# Patient Record
Sex: MALE | Race: WHITE | NOT HISPANIC OR LATINO | Employment: PART TIME | URBAN - METROPOLITAN AREA
[De-identification: names, ages, dates, MRNs, and addresses within clinical notes are randomized per-mention and may not be internally consistent; named-entity substitution may affect disease eponyms.]

---

## 2018-04-16 PROBLEM — Z87.891 FORMER TOBACCO USE: Status: ACTIVE | Noted: 2018-04-16

## 2018-04-16 PROBLEM — Z95.1 S/P CABG (CORONARY ARTERY BYPASS GRAFT): Status: ACTIVE | Noted: 2018-04-16

## 2018-04-17 ENCOUNTER — OFFICE VISIT (OUTPATIENT)
Dept: CARDIOLOGY | Facility: CLINIC | Age: 78
End: 2018-04-17
Attending: INTERNAL MEDICINE
Payer: MEDICARE

## 2018-04-17 VITALS
RESPIRATION RATE: 16 BRPM | DIASTOLIC BLOOD PRESSURE: 60 MMHG | BODY MASS INDEX: 26.18 KG/M2 | HEIGHT: 71 IN | HEART RATE: 59 BPM | SYSTOLIC BLOOD PRESSURE: 116 MMHG | WEIGHT: 187 LBS

## 2018-04-17 DIAGNOSIS — I25.10 CAD IN NATIVE ARTERY: ICD-10-CM

## 2018-04-17 DIAGNOSIS — I63.30 CEREBRAL INFARCTION DUE TO THROMBOSIS OF CEREBRAL ARTERY (CMS/HCC): Primary | ICD-10-CM

## 2018-04-17 DIAGNOSIS — Z95.1 S/P CABG (CORONARY ARTERY BYPASS GRAFT): ICD-10-CM

## 2018-04-17 PROCEDURE — 99214 OFFICE O/P EST MOD 30 MIN: CPT | Performed by: INTERNAL MEDICINE

## 2018-04-17 PROCEDURE — 93000 ELECTROCARDIOGRAM COMPLETE: CPT | Performed by: INTERNAL MEDICINE

## 2018-04-17 RX ORDER — ATORVASTATIN CALCIUM 20 MG/1
20 TABLET, FILM COATED ORAL DAILY
COMMUNITY
Start: 2017-11-27 | End: 2018-11-23 | Stop reason: SDUPTHER

## 2018-04-17 RX ORDER — LANOLIN ALCOHOL/MO/W.PET/CERES
1000 CREAM (GRAM) TOPICAL DAILY
COMMUNITY
Start: 2016-11-23

## 2018-04-17 RX ORDER — VIT C/E/ZN/COPPR/LUTEIN/ZEAXAN 250MG-90MG
4 CAPSULE ORAL DAILY
COMMUNITY
Start: 2016-11-23

## 2018-04-17 RX ORDER — PETROLATUM,WHITE/LANOLIN
OINTMENT (GRAM) TOPICAL DAILY
COMMUNITY
End: 2020-07-17

## 2018-04-17 RX ORDER — CLOPIDOGREL BISULFATE 75 MG/1
75 TABLET ORAL DAILY
COMMUNITY
Start: 2017-06-09 | End: 2018-06-06 | Stop reason: SDUPTHER

## 2018-04-17 RX ORDER — PANTOPRAZOLE SODIUM 40 MG/1
40 TABLET, DELAYED RELEASE ORAL EVERY MORNING
COMMUNITY
Start: 2014-08-18

## 2018-04-17 RX ORDER — ASPIRIN 81 MG/1
81 TABLET ORAL DAILY
COMMUNITY
Start: 2011-03-24

## 2018-04-17 NOTE — PROGRESS NOTES
"   Cardiology Note       Reason for visit: Follow-up of coronary heart disease.  Chief Complaint   Patient presents with   • Coronary Artery Disease   Status post past TIA.  Status post coronary bypass graft surgery.     HPI   Ko Shearer is a 77 y.o. male who presents with a complaint of \"spasm\" around his diaphragm.  He has had 3 such episodes in the past 6 months, the most recent about 3 or 4 days ago.  They do not occur with activity.  They do not awaken him from sleep.  They are short-lived.  Not associated with neurologic symptoms, diaphoresis, nausea vomiting or palpitations.    Since last seen here no other changes in his personal health.  He denies any neurologic symptoms.  Appetite good weight stable.  No ankle edema.  No visual change.  No hematemesis.  No hematuria.  No new rash.  No joint aches.  Since he begun taking magnesium his kidney stone production is been much less.    No new medications.          Past Medical History:   Diagnosis Date   • Coronary artery disease    • Lipid disorder    • TIA (transient ischemic attack)      History reviewed. No pertinent surgical history.  Social History     Social History Narrative   • No narrative on file     No family history on file.  Adhesive tape-silicones  Current Outpatient Prescriptions   Medication Sig Dispense Refill   • aspirin (ASPIR-81) 81 mg enteric coated tablet Take 81 mg by mouth daily.     • atorvastatin (LIPITOR) 20 mg tablet Take 20 mg by mouth daily.     • cholecalciferol, vitamin D3, (VITAMIN D3) 1,000 unit capsule Take 1 capsule by mouth daily.     • clopidogrel (PLAVIX) 75 mg tablet Take 75 mg by mouth daily.     • cyanocobalamin (vitamin B-12) 1,000 mcg tablet take 1 tablet by oral route  every day     • multivitamin capsule one capsule twice daily.     • pantoprazole (PROTONIX) 40 mg EC tablet Take 40 mg by mouth daily.     • glucosamine sulfate 1,000 mg capsule No SIG Entered       No current facility-administered medications for " "this visit.        Review of Systems   All other systems reviewed and are negative.    Objective   Vitals:    04/17/18 1406   BP: 116/60   Pulse: (!) 59   Resp: 16       Physical Exam   Constitutional: He is oriented to person, place, and time. He appears well-developed and well-nourished. No distress.   HENT:   Head: Normocephalic and atraumatic.   Right Ear: External ear normal.   Left Ear: External ear normal.   Eyes: Conjunctivae and EOM are normal. Pupils are equal, round, and reactive to light.   Neck: Normal range of motion. Neck supple. No JVD present.   Cardiovascular: Normal rate, regular rhythm, normal heart sounds and intact distal pulses.  Exam reveals no friction rub.    Pulmonary/Chest: Effort normal and breath sounds normal.   Abdominal: Soft. Bowel sounds are normal.   Moderately obese.   Musculoskeletal: Normal range of motion. He exhibits no edema or deformity.   Neurological: He is alert and oriented to person, place, and time. He has normal reflexes.   Skin: Skin is warm and dry.   Scattered scarring on the face and upper torso from previous acneiform rash.   Psychiatric: His behavior is normal. Judgment and thought content normal.   Nursing note and vitals reviewed.      No results found for: WBC, HGB, PLT, CHOL, TRIG, HDL, LDLDIRECT, ALT, AST, NA, K, CREATININE, TSH, INR, GLUF, HGBA1C   Imaging  Not applicable    ECG   sinus rhythm  With a nonspecific interventricular conduction delay.  No change compared to prior tracings.   Assessment  and plan:    1.  Coronary heart disease status post past coronary bypass graft surgery.  His EKG is stable.  Physical exam remains unchanged without evidence of congestive heart failure or decline in the control of his heart rate or blood pressure.  It is unclear to me whether these episodes of \"spasm\" represent a cardiac symptom.  He feels that they are similar to the episodes that led up to his eventual catheterization, the diagnosis of multivessel coronary " "disease and his surgery.  For now, we will plan to watch the pattern.  I did not limit his activity.  I did not change his medications.  He will report promptly if the pattern of symptoms accelerates in frequency, duration or severity.  I will plan to see him in 3 months rather than 6 months in follow-up.    2.  Dyslipidemia.  Tolerating his current medications well.  He anticipates seeing you for general medical evaluation and physical in several months and labs will be done at that time.    3.  History of TIA.  Continues on dual antiplatelet therapy without any symptoms of recurrence.    I encouraged him to call with any questions or problems and again to report an acceleration the symptoms of \"spasm\" that he described above.  Should that occur we will act upon it to make sure that these do not represent a decline in his cardiovascular status.       Manjeet Salter MD  4/17/2018                   "

## 2018-04-17 NOTE — LETTER
"April 17, 2018     Rainer Schuler,   [address] [facility]  [address]nd [address]  [facility] [address]    Patient: Ko Shearer   YOB: 1940   Date of Visit: 4/17/2018       Dear Dr. Schuler:    Thank you for referring Ko Shearer to me for evaluation. Below are my notes for this consultation.    If you have questions, please do not hesitate to call me. I look forward to following your patient along with you.         Sincerely,        Manjeet Salter MD        CC: No Recipients  Manjeet Salter MD  4/17/2018  2:34 PM  Sign at close encounter     Cardiology Note       Reason for visit: Follow-up of coronary heart disease.  Chief Complaint   Patient presents with   • Coronary Artery Disease   Status post past TIA.  Status post coronary bypass graft surgery.     HPI   Ko Shearer is a 77 y.o. male who presents with a complaint of \"spasm\" around his diaphragm.  He has had 3 such episodes in the past 6 months, the most recent about 3 or 4 days ago.  They do not occur with activity.  They do not awaken him from sleep.  They are short-lived.  Not associated with neurologic symptoms, diaphoresis, nausea vomiting or palpitations.    Since last seen here no other changes in his personal health.  He denies any neurologic symptoms.  Appetite good weight stable.  No ankle edema.  No visual change.  No hematemesis.  No hematuria.  No new rash.  No joint aches.  Since he begun taking magnesium his kidney stone production is been much less.    No new medications.          Past Medical History:   Diagnosis Date   • Coronary artery disease    • Lipid disorder    • TIA (transient ischemic attack)      History reviewed. No pertinent surgical history.  Social History     Social History Narrative   • No narrative on file     No family history on file.  Adhesive tape-silicones  Current Outpatient Prescriptions   Medication Sig Dispense Refill   • aspirin (ASPIR-81) 81 mg enteric coated tablet Take 81 mg by mouth daily.     • " atorvastatin (LIPITOR) 20 mg tablet Take 20 mg by mouth daily.     • cholecalciferol, vitamin D3, (VITAMIN D3) 1,000 unit capsule Take 1 capsule by mouth daily.     • clopidogrel (PLAVIX) 75 mg tablet Take 75 mg by mouth daily.     • cyanocobalamin (vitamin B-12) 1,000 mcg tablet take 1 tablet by oral route  every day     • multivitamin capsule one capsule twice daily.     • pantoprazole (PROTONIX) 40 mg EC tablet Take 40 mg by mouth daily.     • glucosamine sulfate 1,000 mg capsule No SIG Entered       No current facility-administered medications for this visit.        Review of Systems   All other systems reviewed and are negative.    Objective   Vitals:    04/17/18 1406   BP: 116/60   Pulse: (!) 59   Resp: 16       Physical Exam   Constitutional: He is oriented to person, place, and time. He appears well-developed and well-nourished. No distress.   HENT:   Head: Normocephalic and atraumatic.   Right Ear: External ear normal.   Left Ear: External ear normal.   Eyes: Conjunctivae and EOM are normal. Pupils are equal, round, and reactive to light.   Neck: Normal range of motion. Neck supple. No JVD present.   Cardiovascular: Normal rate, regular rhythm, normal heart sounds and intact distal pulses.  Exam reveals no friction rub.    Pulmonary/Chest: Effort normal and breath sounds normal.   Abdominal: Soft. Bowel sounds are normal.   Moderately obese.   Musculoskeletal: Normal range of motion. He exhibits no edema or deformity.   Neurological: He is alert and oriented to person, place, and time. He has normal reflexes.   Skin: Skin is warm and dry.   Scattered scarring on the face and upper torso from previous acneiform rash.   Psychiatric: His behavior is normal. Judgment and thought content normal.   Nursing note and vitals reviewed.      No results found for: WBC, HGB, PLT, CHOL, TRIG, HDL, LDLDIRECT, ALT, AST, NA, K, CREATININE, TSH, INR, GLUF, HGBA1C   Imaging  Not applicable    ECG   sinus rhythm  With a  "nonspecific interventricular conduction delay.  No change compared to prior tracings.   Assessment  and plan:    1.  Coronary heart disease status post past coronary bypass graft surgery.  His EKG is stable.  Physical exam remains unchanged without evidence of congestive heart failure or decline in the control of his heart rate or blood pressure.  It is unclear to me whether these episodes of \"spasm\" represent a cardiac symptom.  He feels that they are similar to the episodes that led up to his eventual catheterization, the diagnosis of multivessel coronary disease and his surgery.  For now, we will plan to watch the pattern.  I did not limit his activity.  I did not change his medications.  He will report promptly if the pattern of symptoms accelerates in frequency, duration or severity.  I will plan to see him in 3 months rather than 6 months in follow-up.    2.  Dyslipidemia.  Tolerating his current medications well.  He anticipates seeing you for general medical evaluation and physical in several months and labs will be done at that time.    3.  History of TIA.  Continues on dual antiplatelet therapy without any symptoms of recurrence.    I encouraged him to call with any questions or problems and again to report an acceleration the symptoms of \"spasm\" that he described above.  Should that occur we will act upon it to make sure that these do not represent a decline in his cardiovascular status.       Manjeet Salter MD  4/17/2018                              "

## 2018-05-04 ENCOUNTER — TELEPHONE (OUTPATIENT)
Dept: CARDIOLOGY | Facility: CLINIC | Age: 78
End: 2018-05-04

## 2018-05-04 NOTE — TELEPHONE ENCOUNTER
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 Pt asked to speak with someone in regards to this.

## 2018-05-07 ENCOUNTER — TELEPHONE (OUTPATIENT)
Dept: SCHEDULING | Facility: CLINIC | Age: 78
End: 2018-05-07

## 2018-05-07 DIAGNOSIS — Z02.4 ENCOUNTER FOR COMMERCIAL DRIVER MEDICAL EXAMINATION (CDME): ICD-10-CM

## 2018-05-07 DIAGNOSIS — I25.83 CORONARY ARTERY DISEASE DUE TO LIPID RICH PLAQUE: Primary | ICD-10-CM

## 2018-05-07 DIAGNOSIS — I25.10 CORONARY ARTERY DISEASE DUE TO LIPID RICH PLAQUE: Primary | ICD-10-CM

## 2018-05-07 NOTE — TELEPHONE ENCOUNTER
Called patient after talking to Dr. Mcclain and left a message on his machine telling him to drop off the papers and that we fill them out for him.

## 2018-05-07 NOTE — TELEPHONE ENCOUNTER
Pt Dr Satler  Pt returning Jennifer's call from this morning.  There are some forms involved for a waiver.  Pt may drop them off later today.  Pls call pt back when available.  Thank you

## 2018-05-14 ENCOUNTER — TELEPHONE (OUTPATIENT)
Dept: CARDIOLOGY | Facility: HOSPITAL | Age: 78
End: 2018-05-14

## 2018-05-16 ENCOUNTER — HOSPITAL ENCOUNTER (OUTPATIENT)
Dept: CARDIOLOGY | Facility: HOSPITAL | Age: 78
Discharge: HOME | End: 2018-05-16
Attending: INTERNAL MEDICINE
Payer: MEDICARE

## 2018-05-16 ENCOUNTER — TELEPHONE (OUTPATIENT)
Dept: CARDIOLOGY | Facility: CLINIC | Age: 78
End: 2018-05-16

## 2018-05-16 VITALS
BODY MASS INDEX: 26.18 KG/M2 | HEART RATE: 92 BPM | DIASTOLIC BLOOD PRESSURE: 76 MMHG | WEIGHT: 187 LBS | SYSTOLIC BLOOD PRESSURE: 120 MMHG | HEIGHT: 71 IN

## 2018-05-16 DIAGNOSIS — I25.83 CORONARY ARTERY DISEASE DUE TO LIPID RICH PLAQUE: ICD-10-CM

## 2018-05-16 DIAGNOSIS — Z02.4 ENCOUNTER FOR COMMERCIAL DRIVER MEDICAL EXAMINATION (CDME): ICD-10-CM

## 2018-05-16 DIAGNOSIS — I25.10 CORONARY ARTERY DISEASE DUE TO LIPID RICH PLAQUE: ICD-10-CM

## 2018-05-16 LAB
BSA FOR ECHO PROCEDURE: 2.06 M2
E WAVE DECELERATION TIME: 250 MS
E/A RATIO: 0.8
E/E' RATIO: 6.2
E/LAT E' RATIO: 4.7
EDV (BP): 66 ML
EF (A4C): 59 %
EF A2C: 58 %
EJECTION FRACTION: 58 %
ESV (BP): 32 CM3
LEFT VENTRICLE SYSTOLIC VOLUME INDEX: 17.48 CM3/M2
LEFT VENTRICLE SYSTOLIC VOLUME: 36 CM3
LV ESV (APICAL 2 CHAMBER): 28 CM3
LVAD-AP2: 25.5 CM2
LVAD-AP4: 29.2 CM2
LVAS-AP2: 15.2 CM2
LVEDVI(BP): 32.04 ML/M2
LVESVI(A2C): 13.59 CM3/M2
LVESVI(BP): 15.53 CM3/M2
LVLD-AP2: 8.04 CM
LVLD-AP4: 8.07 CM
LVLS-AP2: 7.28 CM
LVLS-AP4: 7.17 CM
MV E'TISSUE VEL-LAT: 0.08 M/S
MV E'TISSUE VEL-MED: 0.06 M/S
MV PEAK A VEL: 0.48 M/S
MV PEAK E VEL: 0.37 M/S
STRESS ANGINA INDEX: 0
STRESS BASELINE BP: NORMAL MMHG
STRESS BASELINE HR: 83 BPM
STRESS PERCENT HR: 101 %
STRESS POST ESTIMATED WORKLOAD: 10.8 METS
STRESS POST EXERCISE DUR MIN: 9 MIN
STRESS POST EXERCISE DUR SEC: 52 SEC
STRESS POST PEAK BP: NORMAL MMHG
STRESS POST PEAK HR: 146 BPM
STRESS TARGET HR: 123 BPM

## 2018-05-16 PROCEDURE — 93016 CV STRESS TEST SUPVJ ONLY: CPT | Performed by: INTERNAL MEDICINE

## 2018-05-16 PROCEDURE — 93351 STRESS TTE COMPLETE: CPT | Mod: 26 | Performed by: INTERNAL MEDICINE

## 2018-05-16 PROCEDURE — 93351 STRESS TTE COMPLETE: CPT

## 2018-05-16 NOTE — TELEPHONE ENCOUNTER
Call the patient and reviewed his stress test.  No obvious ischemia with adequate exercise.  School bus waiver forms filled out.  The patient will stop by the office today to pick them up.

## 2018-06-06 RX ORDER — CLOPIDOGREL BISULFATE 75 MG/1
75 TABLET ORAL DAILY
Qty: 90 TABLET | Refills: 3 | Status: SHIPPED | OUTPATIENT
Start: 2018-06-06 | End: 2019-04-25 | Stop reason: SDUPTHER

## 2018-07-10 ENCOUNTER — OFFICE VISIT (OUTPATIENT)
Dept: CARDIOLOGY | Facility: CLINIC | Age: 78
End: 2018-07-10
Payer: MEDICARE

## 2018-07-10 VITALS
DIASTOLIC BLOOD PRESSURE: 60 MMHG | WEIGHT: 184.1 LBS | BODY MASS INDEX: 26.36 KG/M2 | HEART RATE: 79 BPM | SYSTOLIC BLOOD PRESSURE: 100 MMHG | HEIGHT: 70 IN | RESPIRATION RATE: 16 BRPM

## 2018-07-10 DIAGNOSIS — Z95.1 S/P CABG (CORONARY ARTERY BYPASS GRAFT): Primary | ICD-10-CM

## 2018-07-10 DIAGNOSIS — I25.10 CAD IN NATIVE ARTERY: ICD-10-CM

## 2018-07-10 DIAGNOSIS — E78.5 DYSLIPIDEMIA: ICD-10-CM

## 2018-07-10 DIAGNOSIS — I63.30 CEREBRAL INFARCTION DUE TO THROMBOSIS OF CEREBRAL ARTERY (CMS/HCC): ICD-10-CM

## 2018-07-10 PROCEDURE — 93000 ELECTROCARDIOGRAM COMPLETE: CPT | Performed by: INTERNAL MEDICINE

## 2018-07-10 PROCEDURE — 99214 OFFICE O/P EST MOD 30 MIN: CPT | Performed by: INTERNAL MEDICINE

## 2018-07-10 RX ORDER — MAGNESIUM CHLORIDE 64 MG
250 TABLET, DELAYED RELEASE (ENTERIC COATED) ORAL 2 TIMES DAILY
COMMUNITY

## 2018-07-10 NOTE — PROGRESS NOTES
Cardiology Note       Reason for visit: Scheduled cardiology follow-up  Chief Complaint   Patient presents with   • Coronary Artery Disease   • Follow-up   Dyslipidemia  Suspected TIA  Coronary bypass graft surgery.   HPI   Ko Shearer is a 77 y.o. male who presents no new cardiovascular complaint.  Back in May because of some unusual symptoms in his chest a stress test was performed.  He demonstrated good exercise tolerance and there was no evidence by EKG or echo criteria at that time.  Since then, symptoms have been much less noticeable.  He is active and vigorous around his [place] on the [facility].  He denies palpitations.  He did undergo an ENT evaluation for hearing loss which was felt to be due to cerumen.    No neurologic symptoms.  Good appetite.  Stable weight.    Laboratory studies were obtained by you and made.  They were reviewed in detail in the office today.          Past Medical History:   Diagnosis Date   • Coronary artery disease    • Lipid disorder    • TIA (transient ischemic attack)      No past surgical history on file.  Social History     Social History Narrative   • No narrative on file     No family history on file.  Adhesive tape-silicones  Current Outpatient Prescriptions   Medication Sig Dispense Refill   • aspirin (ASPIR-81) 81 mg enteric coated tablet Take 81 mg by mouth daily.     • cholecalciferol, vitamin D3, (VITAMIN D3) 1,000 unit capsule Take 1 capsule by mouth daily.     • clopidogrel (PLAVIX) 75 mg tablet Take 1 tablet (75 mg total) by mouth daily. 90 tablet 3   • FISH OIL-omega-3 fatty acids (FISH OIL) 340-1,000 mg capsule Take by mouth 2 (two) times a day.     • glucosamine sulfate 1,000 mg capsule No SIG Entered     • magnesium chloride 64 mg tablet,delayed release (DR/EC) Take by mouth daily.     • multivitamin capsule one capsule twice daily.     • pantoprazole (PROTONIX) 40 mg EC tablet Take 40 mg by mouth daily.     • atorvastatin (LIPITOR) 20 mg  tablet Take 20 mg by mouth daily.     • cyanocobalamin (vitamin B-12) 1,000 mcg tablet take 1 tablet by oral route  every day       No current facility-administered medications for this visit.        Review of Systems   All other systems reviewed and are negative.    Objective   Vitals:    07/10/18 1528   BP: 100/60   Pulse: 79   Resp: 16       Physical Exam   Constitutional: He is oriented to person, place, and time. He appears well-developed and well-nourished.   HENT:   Head: Normocephalic and atraumatic.   Eyes: Conjunctivae are normal. No scleral icterus.   Neck: Normal range of motion. Neck supple.   Cardiovascular: Normal rate, regular rhythm, normal heart sounds and intact distal pulses.  Exam reveals no friction rub.    No murmur heard.  Pulmonary/Chest: Effort normal and breath sounds normal.   Abdominal: Soft. Bowel sounds are normal.   Musculoskeletal: Normal range of motion. He exhibits no edema or tenderness.   Neurological: He is oriented to person, place, and time. He has normal reflexes.   Skin: No rash noted.   Scattered scarring from previous acne rash on the face back and upper extremities   Psychiatric: He has a normal mood and affect. His behavior is normal. Judgment and thought content normal.   Nursing note and vitals reviewed.      No results found for: WBC, HGB, PLT, CHOL, TRIG, HDL, LDLDIRECT, ALT, AST, NA, K, CREATININE, TSH, INR, HGBA1C   Labs obtained by his family physician from May were reviewed in detail.   Imaging  Not applicable  Stress test results reviewed with him in detail.  ECG   sinus rhythm  Low voltage QRS in the limb leads.  No change from prior EKGs.   ASSESSMENT AND PLAN    1.  Coronary heart disease status post bypass graft surgery.  Recent stress test was negative for ischemia.  Clinically he does well without any symptoms of angina, heart failure or arrhythmia.  His current medications will remain unchanged.  No further testing was advised.  2.  Dyslipidemia.  Her  most recent cholesterol profile was reviewed.  Most notable for minimal elevation of his blood glucose slight elevation of his triglycerides.  We did talk in some detail today about carbohydrate limitation in his diet.  3.  Suspected TIA.  No symptoms to suggest recurrence.  He remains on aggressive antiplatelet therapy with aspirin and Plavix.  4.  Elevated PSA.  This is chronically followed by his urologist.    In summary then, he is stable from a cardiac perspective.  No limitations placed on his activity and follow-up will be in this office in 6 months.       Manjeet Salter MD  7/10/2018

## 2018-07-10 NOTE — LETTER
July 10, 2018     Rainer Schuler, DO  [address] [address]    Patient: Ko Shearer   YOB: 1940   Date of Visit: 7/10/2018       Dear Dr. Schuler:    Thank you for referring Ko Shearer to me for evaluation. Below are my notes for this consultation.    If you have questions, please do not hesitate to call me. I look forward to following your patient along with you.         Sincerely,        Manjeet Salter MD        CC: Anum Basilio-  Manjeet Salter MD  7/10/2018  3:57 PM  Sign at close encounter     Cardiology Note       Reason for visit: Scheduled cardiology follow-up  Chief Complaint   Patient presents with   • Coronary Artery Disease   • Follow-up   Dyslipidemia  Suspected TIA  Coronary bypass graft surgery.   HPI   Ko Shearer is a 77 y.o. male who presents no new cardiovascular complaint.  Back in May because of some unusual symptoms in his chest a stress test was performed.  He demonstrated good exercise tolerance and there was no evidence by EKG or echo criteria at that time.  Since then, symptoms have been much less noticeable.  He is active and vigorous around his [place] on the [facility].  He denies palpitations.  He did undergo an ENT evaluation for hearing loss which was felt to be due to cerumen.    No neurologic symptoms.  Good appetite.  Stable weight.    Laboratory studies were obtained by you and made.  They were reviewed in detail in the office today.          Past Medical History:   Diagnosis Date   • Coronary artery disease    • Lipid disorder    • TIA (transient ischemic attack)      No past surgical history on file.  Social History     Social History Narrative   • No narrative on file     No family history on file.  Adhesive tape-silicones  Current Outpatient Prescriptions   Medication Sig Dispense Refill   • aspirin (ASPIR-81) 81 mg enteric coated tablet Take 81 mg by mouth daily.     • cholecalciferol, vitamin D3,  (VITAMIN D3) 1,000 unit capsule Take 1 capsule by mouth daily.     • clopidogrel (PLAVIX) 75 mg tablet Take 1 tablet (75 mg total) by mouth daily. 90 tablet 3   • FISH OIL-omega-3 fatty acids (FISH OIL) 340-1,000 mg capsule Take by mouth 2 (two) times a day.     • glucosamine sulfate 1,000 mg capsule No SIG Entered     • magnesium chloride 64 mg tablet,delayed release (DR/EC) Take by mouth daily.     • multivitamin capsule one capsule twice daily.     • pantoprazole (PROTONIX) 40 mg EC tablet Take 40 mg by mouth daily.     • atorvastatin (LIPITOR) 20 mg tablet Take 20 mg by mouth daily.     • cyanocobalamin (vitamin B-12) 1,000 mcg tablet take 1 tablet by oral route  every day       No current facility-administered medications for this visit.        Review of Systems   All other systems reviewed and are negative.    Objective   Vitals:    07/10/18 1528   BP: 100/60   Pulse: 79   Resp: 16       Physical Exam   Constitutional: He is oriented to person, place, and time. He appears well-developed and well-nourished.   HENT:   Head: Normocephalic and atraumatic.   Eyes: Conjunctivae are normal. No scleral icterus.   Neck: Normal range of motion. Neck supple.   Cardiovascular: Normal rate, regular rhythm, normal heart sounds and intact distal pulses.  Exam reveals no friction rub.    No murmur heard.  Pulmonary/Chest: Effort normal and breath sounds normal.   Abdominal: Soft. Bowel sounds are normal.   Musculoskeletal: Normal range of motion. He exhibits no edema or tenderness.   Neurological: He is oriented to person, place, and time. He has normal reflexes.   Skin: No rash noted.   Scattered scarring from previous acne rash on the face back and upper extremities   Psychiatric: He has a normal mood and affect. His behavior is normal. Judgment and thought content normal.   Nursing note and vitals reviewed.      No results found for: WBC, HGB, PLT, CHOL, TRIG, HDL, LDLDIRECT, ALT, AST, NA, K, CREATININE, TSH, INR, HGBA1C    Labs obtained by his family physician from May were reviewed in detail.   Imaging  Not applicable  Stress test results reviewed with him in detail.  ECG   sinus rhythm  Low voltage QRS in the limb leads.  No change from prior EKGs.   ASSESSMENT AND PLAN    1.  Coronary heart disease status post bypass graft surgery.  Recent stress test was negative for ischemia.  Clinically he does well without any symptoms of angina, heart failure or arrhythmia.  His current medications will remain unchanged.  No further testing was advised.  2.  Dyslipidemia.  Her most recent cholesterol profile was reviewed.  Most notable for minimal elevation of his blood glucose slight elevation of his triglycerides.  We did talk in some detail today about carbohydrate limitation in his diet.  3.  Suspected TIA.  No symptoms to suggest recurrence.  He remains on aggressive antiplatelet therapy with aspirin and Plavix.  4.  Elevated PSA.  This is chronically followed by his urologist.    In summary then, he is stable from a cardiac perspective.  No limitations placed on his activity and follow-up will be in this office in 6 months.       Manjeet Salter MD  7/10/2018

## 2018-09-04 ENCOUNTER — TRANSCRIBE ORDERS (OUTPATIENT)
Dept: RADIOLOGY | Facility: CLINIC | Age: 78
End: 2018-09-04

## 2018-09-04 ENCOUNTER — HOSPITAL ENCOUNTER (OUTPATIENT)
Dept: RADIOLOGY | Facility: CLINIC | Age: 78
Discharge: HOME | End: 2018-09-04
Attending: FAMILY MEDICINE
Payer: MEDICARE

## 2018-09-04 DIAGNOSIS — M25.522 LEFT ELBOW PAIN: ICD-10-CM

## 2018-09-04 DIAGNOSIS — M25.522 LEFT ELBOW PAIN: Primary | ICD-10-CM

## 2018-09-04 PROCEDURE — 73080 X-RAY EXAM OF ELBOW: CPT | Mod: LT

## 2018-11-23 RX ORDER — ATORVASTATIN CALCIUM 20 MG/1
20 TABLET, FILM COATED ORAL DAILY
Qty: 90 TABLET | Refills: 3 | Status: SHIPPED | OUTPATIENT
Start: 2018-11-23 | End: 2019-11-22 | Stop reason: SDUPTHER

## 2018-12-17 ENCOUNTER — HOSPITAL ENCOUNTER (OUTPATIENT)
Dept: RADIOLOGY | Facility: CLINIC | Age: 78
Discharge: HOME | End: 2018-12-17
Attending: FAMILY MEDICINE
Payer: MEDICARE

## 2018-12-17 ENCOUNTER — TRANSCRIBE ORDERS (OUTPATIENT)
Dept: RADIOLOGY | Facility: CLINIC | Age: 78
End: 2018-12-17

## 2018-12-17 DIAGNOSIS — M15.9 POLYOSTEOARTHRITIS: ICD-10-CM

## 2018-12-17 DIAGNOSIS — M51.379 OTHER INTERVERTEBRAL DISC DEGENERATION, LUMBOSACRAL REGION: ICD-10-CM

## 2018-12-17 DIAGNOSIS — M51.379 OTHER INTERVERTEBRAL DISC DEGENERATION, LUMBOSACRAL REGION: Primary | ICD-10-CM

## 2018-12-17 PROCEDURE — 72110 X-RAY EXAM L-2 SPINE 4/>VWS: CPT

## 2018-12-17 PROCEDURE — 73522 X-RAY EXAM HIPS BI 3-4 VIEWS: CPT

## 2018-12-27 PROBLEM — Z95.1 S/P CABG (CORONARY ARTERY BYPASS GRAFT): Chronic | Status: ACTIVE | Noted: 2018-04-16

## 2019-01-10 ENCOUNTER — OFFICE VISIT (OUTPATIENT)
Dept: CARDIOLOGY | Facility: CLINIC | Age: 79
End: 2019-01-10
Payer: MEDICARE

## 2019-01-10 VITALS
SYSTOLIC BLOOD PRESSURE: 120 MMHG | WEIGHT: 181.9 LBS | RESPIRATION RATE: 16 BRPM | BODY MASS INDEX: 26.04 KG/M2 | HEIGHT: 70 IN | DIASTOLIC BLOOD PRESSURE: 62 MMHG | HEART RATE: 68 BPM

## 2019-01-10 DIAGNOSIS — Z95.1 S/P CABG (CORONARY ARTERY BYPASS GRAFT): Primary | Chronic | ICD-10-CM

## 2019-01-10 DIAGNOSIS — I25.10 CAD IN NATIVE ARTERY: ICD-10-CM

## 2019-01-10 DIAGNOSIS — E78.5 DYSLIPIDEMIA: Chronic | ICD-10-CM

## 2019-01-10 DIAGNOSIS — I63.30 CEREBRAL INFARCTION DUE TO THROMBOSIS OF CEREBRAL ARTERY (CMS/HCC): ICD-10-CM

## 2019-01-10 PROCEDURE — 93000 ELECTROCARDIOGRAM COMPLETE: CPT | Performed by: INTERNAL MEDICINE

## 2019-01-10 PROCEDURE — 99213 OFFICE O/P EST LOW 20 MIN: CPT | Performed by: INTERNAL MEDICINE

## 2019-01-10 ASSESSMENT — ENCOUNTER SYMPTOMS: DYSPNEA ON EXERTION: 0

## 2019-01-10 NOTE — LETTER
January 10, 2019     Rainer Schuler,   [address] [facility]  [address] [facility] [address]    Patient: Ko Shearer   YOB: 1940   Date of Visit: 1/10/2019       Dear Dr. Schuler:    Thank you for referring Ko Shearer to me for evaluation. Below are my notes for this consultation.    If you have questions, please do not hesitate to call me. I look forward to following your patient along with you.         Sincerely,        Manjeet Salter MD        CC: No Recipients  Manjeet Salter MD  1/10/2019 12:49 PM  Sign at close encounter     Cardiology Note       Reason for visit: Scheduled cardiology follow-up  Chief Complaint   Patient presents with   • S/P CABG   Dyslipidemia  Suspected TIA  Coronary bypass graft surgery.   HPI   Ko Shearer is a 78 y.o. male who presents no new cardiovascular complaint.  He denies anginal chest pain, palpitations or dyspnea.  He had a fleeting episode of some precordial chest sensations for 1 or 2 days before Thanksgiving.  These were short-lived.  Not associated with tightness or squeezing or other complaint.  They eventually resolved and have not returned.    No neurologic symptoms.  Good appetite.  Stable weight.    He is now working full-time at the [employer] for [employer].  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          Past Medical History:   Diagnosis Date   • Coronary artery disease    • Lipid disorder    • TIA (transient ischemic attack)      No past surgical history on file.  Social History     Social History Narrative   • No narrative on file     No family history on file.  Adhesive tape-silicones  Current Outpatient Prescriptions   Medication Sig Dispense Refill   • aspirin (ASPIR-81) 81 mg enteric coated tablet Take 81 mg by mouth daily.     • atorvastatin (LIPITOR) 20 mg tablet Take 1 tablet (20 mg total) by mouth daily. 90 tablet 3   • cholecalciferol, vitamin D3, (VITAMIN D3) 1,000 unit capsule Take 1 capsule by mouth  daily.     • clopidogrel (PLAVIX) 75 mg tablet Take 1 tablet (75 mg total) by mouth daily. 90 tablet 3   • cyanocobalamin (vitamin B-12) 1,000 mcg tablet take 1 tablet by oral route  every day     • FISH OIL-omega-3 fatty acids (FISH OIL) 340-1,000 mg capsule Take by mouth 2 (two) times a day.     • glucosamine sulfate 1,000 mg capsule No SIG Entered     • magnesium chloride 64 mg tablet,delayed release (DR/EC) Take by mouth daily.     • multivitamin capsule one capsule twice daily.     • pantoprazole (PROTONIX) 40 mg EC tablet Take 40 mg by mouth daily.       No current facility-administered medications for this visit.        Review of Systems   Cardiovascular: Negative for chest pain and dyspnea on exertion.   All other systems reviewed and are negative.    Objective   Vitals:    01/10/19 1221   BP: 120/62   Pulse: 68   Resp: 16   Weight 181.9 pounds    Physical Exam   Constitutional: He is oriented to person, place, and time. He appears well-developed and well-nourished.   HENT:   Head: Normocephalic and atraumatic.   Nose: Nose normal.   Eyes: Conjunctivae are normal. No scleral icterus.   Neck: Normal range of motion. Neck supple.   Cardiovascular: Normal rate, regular rhythm, normal heart sounds and intact distal pulses.  Exam reveals no friction rub.    No murmur heard.  Pulmonary/Chest: Effort normal and breath sounds normal.   Abdominal: Soft. Bowel sounds are normal.   Musculoskeletal: Normal range of motion. He exhibits no edema or tenderness.   Neurological: He is oriented to person, place, and time. He has normal reflexes.   Skin: No rash noted.   Scattered scarring from previous acne rash on the face back and upper extremities   Psychiatric: He has a normal mood and affect. His behavior is normal. Judgment and thought content normal.   Nursing note and vitals reviewed.      No results found for: WBC, HGB, PLT, CHOL, TRIG, HDL, LDLDIRECT, ALT, AST, NA, K, CREATININE, TSH, INR, HGBA1C   Labs obtained by  his family physician from May were reviewed in detail.   Imaging  Not applicable  Stress test results reviewed with him in detail.  ECG   sinus rhythm  Mild nonspecific ST-T changes.  Similar to previous EKG.   ASSESSMENT AND PLAN    1.  Coronary heart disease status post bypass graft surgery.  Recent stress test was negative for ischemia.  Clinically he does well without any symptoms of angina, heart failure or arrhythmia.  His current medications will remain unchanged.  No further testing was advised.    2.  Dyslipidemia.  Her most recent cholesterol profile was reviewed.  Most notable for minimal elevation of his blood glucose slight elevation of his triglycerides.  We did talk in some detail today about carbohydrate limitation in his diet.    3.  Suspected TIA.  No symptoms to suggest recurrence.  He remains on aggressive antiplatelet therapy with aspirin and Plavix.    4.  Elevated PSA.  This is chronically followed by his urologist.    In summary then, he is stable from a cardiac perspective.  No limitations placed on his activity and follow-up will be in this office in 6 months.       Manjeet Salter MD  1/10/2019

## 2019-01-10 NOTE — PROGRESS NOTES
Cardiology Note       Reason for visit: Scheduled cardiology follow-up  Chief Complaint   Patient presents with   • S/P CABG   Dyslipidemia  Suspected TIA  Coronary bypass graft surgery.   HPI   Ko Shearer is a 78 y.o. male who presents no new cardiovascular complaint.  He denies anginal chest pain, palpitations or dyspnea.  He had a fleeting episode of some precordial chest sensations for 1 or 2 days before Thanksgiving.  These were short-lived.  Not associated with tightness or squeezing or other complaint.  They eventually resolved and have not returned.    No neurologic symptoms.  Good appetite.  Stable weight.    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          Past Medical History:   Diagnosis Date   • Coronary artery disease    • Lipid disorder    • TIA (transient ischemic attack)      No past surgical history on file.  Social History     Social History Narrative   • No narrative on file     No family history on file.  Adhesive tape-silicones  Current Outpatient Prescriptions   Medication Sig Dispense Refill   • aspirin (ASPIR-81) 81 mg enteric coated tablet Take 81 mg by mouth daily.     • atorvastatin (LIPITOR) 20 mg tablet Take 1 tablet (20 mg total) by mouth daily. 90 tablet 3   • cholecalciferol, vitamin D3, (VITAMIN D3) 1,000 unit capsule Take 1 capsule by mouth daily.     • clopidogrel (PLAVIX) 75 mg tablet Take 1 tablet (75 mg total) by mouth daily. 90 tablet 3   • cyanocobalamin (vitamin B-12) 1,000 mcg tablet take 1 tablet by oral route  every day     • FISH OIL-omega-3 fatty acids (FISH OIL) 340-1,000 mg capsule Take by mouth 2 (two) times a day.     • glucosamine sulfate 1,000 mg capsule No SIG Entered     • magnesium chloride 64 mg tablet,delayed release (DR/EC) Take by mouth daily.     • multivitamin capsule one capsule twice daily.     • pantoprazole (PROTONIX) 40 mg EC tablet Take 40 mg by mouth  daily.       No current facility-administered medications for this visit.        Review of Systems   Cardiovascular: Negative for chest pain and dyspnea on exertion.   All other systems reviewed and are negative.    Objective   Vitals:    01/10/19 1221   BP: 120/62   Pulse: 68   Resp: 16   Weight 181.9 pounds    Physical Exam   Constitutional: He is oriented to person, place, and time. He appears well-developed and well-nourished.   HENT:   Head: Normocephalic and atraumatic.   Nose: Nose normal.   Eyes: Conjunctivae are normal. No scleral icterus.   Neck: Normal range of motion. Neck supple.   Cardiovascular: Normal rate, regular rhythm, normal heart sounds and intact distal pulses.  Exam reveals no friction rub.    No murmur heard.  Pulmonary/Chest: Effort normal and breath sounds normal.   Abdominal: Soft. Bowel sounds are normal.   Musculoskeletal: Normal range of motion. He exhibits no edema or tenderness.   Neurological: He is oriented to person, place, and time. He has normal reflexes.   Skin: No rash noted.   Scattered scarring from previous acne rash on the face back and upper extremities   Psychiatric: He has a normal mood and affect. His behavior is normal. Judgment and thought content normal.   Nursing note and vitals reviewed.      No results found for: WBC, HGB, PLT, CHOL, TRIG, HDL, LDLDIRECT, ALT, AST, NA, K, CREATININE, TSH, INR, HGBA1C   Labs obtained by his family physician from May were reviewed in detail.   Imaging  Not applicable  Stress test results reviewed with him in detail.  ECG   sinus rhythm  Mild nonspecific ST-T changes.  Similar to previous EKG.   ASSESSMENT AND PLAN    1.  Coronary heart disease status post bypass graft surgery.  Recent stress test was negative for ischemia.  Clinically he does well without any symptoms of angina, heart failure or arrhythmia.  His current medications will remain unchanged.  No further testing was advised.    2.  Dyslipidemia.  Her most recent  cholesterol profile was reviewed.  Most notable for minimal elevation of his blood glucose slight elevation of his triglycerides.  We did talk in some detail today about carbohydrate limitation in his diet.    3.  Suspected TIA.  No symptoms to suggest recurrence.  He remains on aggressive antiplatelet therapy with aspirin and Plavix.    4.  Elevated PSA.  This is chronically followed by his urologist.    In summary then, he is stable from a cardiac perspective.  No limitations placed on his activity and follow-up will be in this office in 6 months.       Manjeet Salter MD  1/10/2019

## 2019-03-04 ENCOUNTER — TELEPHONE (OUTPATIENT)
Dept: CARDIOLOGY | Facility: CLINIC | Age: 79
End: 2019-03-04

## 2019-03-04 NOTE — TELEPHONE ENCOUNTER
Dr Salter Patient- Patient calling. States he has been getting lt nostril bleeds two -three times week since end of Dec. States started occurring when he [facility] which was different pill.  States he used to get tan tablet and now larger white tablet.  States he stuffs nostril with napkin and stops.    Instructed to [facility] to see if changes  of clopidogril . Instructed when gets nose bleeds to try pinching bending forward. Use Humidifier, use  saline nose spray. Call ENT MD if persists    Please contact at

## 2019-03-04 NOTE — TELEPHONE ENCOUNTER
Spoke to patient. He was having 2-3 nosebleeds a week lasting up to 10 minutes that he would have to pack his nose for 5-10 minutes - on occasion he would start to bleed again once he took te tissue out. It was upsetting yo him because sometimes he would stain hs clothes or rug.    He called the prescription plan [facility] and explained the situation because the bleeding began once he got a different generic.  They are going to give him back to generic that he was on previously and will see if the symptoms resolves with the change in generic.  He had never had bleeding problems even when he had major dental work.  He will call the office back once he knows how he does with the old generic clopidogrel that he was on his resumed.

## 2019-03-11 NOTE — TELEPHONE ENCOUNTER
Left voicemail message for patient that Dr. Salter recommended ENT evaluation.  Also did he get the old generic clopidogrel that he been on because he thought that was contributing to his nosebleeds.  Await hearing from patient.

## 2019-04-25 ENCOUNTER — TELEPHONE (OUTPATIENT)
Dept: CARDIOLOGY | Facility: CLINIC | Age: 79
End: 2019-04-25

## 2019-04-25 RX ORDER — CLOPIDOGREL BISULFATE 75 MG/1
75 TABLET ORAL DAILY
Qty: 90 TABLET | Refills: 3 | Status: SHIPPED | OUTPATIENT
Start: 2019-04-25 | End: 2020-05-08 | Stop reason: SDUPTHER

## 2019-04-25 NOTE — TELEPHONE ENCOUNTER
Received call from pt's spouse requesting refill of plavix.  Pt's wife requesting plavix from  [facility].     [facility]scribed [facility].

## 2019-05-10 ENCOUNTER — TELEPHONE (OUTPATIENT)
Dept: SCHEDULING | Facility: CLINIC | Age: 79
End: 2019-05-10

## 2019-05-10 NOTE — TELEPHONE ENCOUNTER
Nurse Jorge Alberto,  Pt called to inform that he's faxing three forms:     1). 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  A General Neurological Form.      Pt states that you assisted in the past with similar forms.  Any questions, please contact pt at

## 2019-05-13 ENCOUNTER — TELEPHONE (OUTPATIENT)
Dept: CARDIOLOGY | Facility: HOSPITAL | Age: 79
End: 2019-05-13

## 2019-05-13 DIAGNOSIS — I25.10 CORONARY ARTERY DISEASE DUE TO LIPID RICH PLAQUE: ICD-10-CM

## 2019-05-13 DIAGNOSIS — Z02.4 ENCOUNTER FOR DEPARTMENT OF TRANSPORTATION (DOT) EXAMINATION FOR SCHOOL BUS LICENCE: Primary | ICD-10-CM

## 2019-05-13 DIAGNOSIS — I25.83 CORONARY ARTERY DISEASE DUE TO LIPID RICH PLAQUE: ICD-10-CM

## 2019-05-13 NOTE — TELEPHONE ENCOUNTER
Bhumi----I called the pt and spoke to his wife.  I informed her that I will order stress echocardiogram.  He will have to have his neurological forms filled out by Dr. Schuler.  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  Thanks

## 2019-05-13 NOTE — TELEPHONE ENCOUNTER
I left the pt a vm. Pt is scheduled on 5/14 for stress echo at [facility]. Pt is call office to confirm appt...

## 2019-05-14 ENCOUNTER — TELEPHONE (OUTPATIENT)
Dept: CARDIOLOGY | Facility: HOSPITAL | Age: 79
End: 2019-05-14

## 2019-05-14 NOTE — TELEPHONE ENCOUNTER
I called the pt.  He apologized for the confusion.  He didn't get the message until late last night.  He may not need the stress test after all.  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  He is going to the bus company today to find out.  I gave him my direct number and he'll let me know.

## 2019-05-14 NOTE — TELEPHONE ENCOUNTER
Pt calling Jennifer MCCORMICK To inform he had to Reschd Stress test to 5/29 due to receiving msg late last night confirming appt.      Pt ask for call back at #[phone]

## 2019-05-15 ENCOUNTER — TELEPHONE (OUTPATIENT)
Dept: CARDIOLOGY | Facility: CLINIC | Age: 79
End: 2019-05-15

## 2019-05-15 NOTE — TELEPHONE ENCOUNTER
The patient called me.  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  Forms mailed back to the pt at his request and stress test cancelled.

## 2019-05-28 ENCOUNTER — TELEPHONE (OUTPATIENT)
Dept: CARDIOLOGY | Facility: CLINIC | Age: 79
End: 2019-05-28

## 2019-05-28 NOTE — TELEPHONE ENCOUNTER
I called the pt and LMOM, voice identified letting him know that we received his labs from Dr Kc.  No change to meds per [facility].

## 2019-05-28 NOTE — TELEPHONE ENCOUNTER
----- Message from Manjeet Salter MD sent at 5/28/2019 11:35 AM EDT -----  TP    Please call the patient.  Cholesterol values are acceptable and he should continue his current medications and dietary habits.    Manjeet Salter MD  ----- Message -----  From: Interface, Transcription Incoming  Sent: 5/24/2019  11:57 AM  To: Manjeet Salter MD

## 2019-07-12 ENCOUNTER — OFFICE VISIT (OUTPATIENT)
Dept: CARDIOLOGY | Facility: CLINIC | Age: 79
End: 2019-07-12
Payer: MEDICARE

## 2019-07-12 VITALS
WEIGHT: 182 LBS | BODY MASS INDEX: 26.05 KG/M2 | HEIGHT: 70 IN | HEART RATE: 81 BPM | OXYGEN SATURATION: 95 % | SYSTOLIC BLOOD PRESSURE: 108 MMHG | DIASTOLIC BLOOD PRESSURE: 60 MMHG

## 2019-07-12 DIAGNOSIS — Z95.1 S/P CABG (CORONARY ARTERY BYPASS GRAFT): Primary | Chronic | ICD-10-CM

## 2019-07-12 DIAGNOSIS — I63.30 CEREBRAL INFARCTION DUE TO THROMBOSIS OF CEREBRAL ARTERY (CMS/HCC): ICD-10-CM

## 2019-07-12 DIAGNOSIS — E78.5 DYSLIPIDEMIA: Chronic | ICD-10-CM

## 2019-07-12 PROCEDURE — 99212 OFFICE O/P EST SF 10 MIN: CPT | Performed by: INTERNAL MEDICINE

## 2019-07-12 ASSESSMENT — ENCOUNTER SYMPTOMS: DYSPNEA ON EXERTION: 0

## 2019-07-12 NOTE — LETTER
July 12, 2019     Rainer Schuler DO  [address] [address]    Patient: Ko Shearer  YOB: 1940  Date of Visit: 7/12/2019      Dear Dr. Schuler:    Thank you for referring Ko Shearer to me for evaluation. Below are my notes for this consultation.    If you have questions, please do not hesitate to call me. I look forward to following your patient along with you.         Sincerely,        Manjeet Salter MD        CC: No Recipients  Manjeet Salter MD  7/12/2019 11:53 AM  Sign at close encounter     Cardiology Note       Reason for visit: Scheduled cardiology follow-up    Chief Complaint   Patient presents with   • Coronary Artery Disease   • Hypertension   • Dizziness   Dyslipidemia  Suspected TIA  Coronary bypass graft surgery.     HPI   Ko Shearer is a 78 y.o. male who presents no new cardiovascular complaint.  He denies anginal chest pain, palpitations or dyspnea.  He did suffer an injury while working on his boat with some mild head trauma without loss of consciousness and likely some left lateral chest wall discomfort.  He had symptoms of discomfort with deep breathing in his chest for about 2 weeks which is gradually resolved. No neurologic symptoms.  Good appetite.  Stable weight.    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    No change in medications.      Past Medical History:   Diagnosis Date   • Coronary artery disease    • Lipid disorder    • TIA (transient ischemic attack)      Past Surgical History:   Procedure Laterality Date   • CARDIAC SURGERY      CABG  2008     Social History     Social History Narrative   • No narrative on file     Family History   Problem Relation Age of Onset   • Heart disease Mother    • Heart failure Mother    • Cancer Father      Adhesive tape-silicones  Current Outpatient Prescriptions   Medication Sig Dispense Refill   • aspirin  (ASPIR-81) 81 mg enteric coated tablet Take 81 mg by mouth daily.     • atorvastatin (LIPITOR) 20 mg tablet Take 1 tablet (20 mg total) by mouth daily. 90 tablet 3   • cholecalciferol, vitamin D3, (VITAMIN D3) 1,000 unit capsule Take 1 capsule by mouth daily.     • clopidogrel (PLAVIX) 75 mg tablet Take 1 tablet (75 mg total) by mouth daily. 90 tablet 3   • cyanocobalamin (vitamin B-12) 1,000 mcg tablet take 1 tablet by oral route  every day     • FISH OIL-omega-3 fatty acids (FISH OIL) 340-1,000 mg capsule Take by mouth 2 (two) times a day.     • glucosamine sulfate 1,000 mg capsule No SIG Entered     • magnesium chloride 64 mg tablet,delayed release (DR/EC) Take by mouth daily.     • multivitamin capsule one capsule twice daily.     • pantoprazole (PROTONIX) 40 mg EC tablet Take 40 mg by mouth daily.       No current facility-administered medications for this visit.        Review of Systems   Cardiovascular: Negative for chest pain and dyspnea on exertion.   All other systems reviewed and are negative.    Objective   Vitals:    07/12/19 1137   BP: 108/60   Pulse: 81   SpO2: 95%   Weight 181.9 pounds    Physical Exam   Constitutional: He is oriented to person, place, and time. He appears well-developed and well-nourished.   HENT:   Head: Normocephalic and atraumatic.   Nose: Nose normal.   Eyes: Conjunctivae are normal. No scleral icterus.   Neck: Normal range of motion. Neck supple.   Cardiovascular: Normal rate, regular rhythm, normal heart sounds and intact distal pulses.  Exam reveals no friction rub.    No murmur heard.  Pulmonary/Chest: Effort normal and breath sounds normal. He has no rales.   Abdominal: Soft. Bowel sounds are normal. There is no tenderness. There is no rebound.   Musculoskeletal: Normal range of motion. He exhibits no edema or tenderness.   Neurological: He is oriented to person, place, and time. He has normal reflexes.   Skin: No rash noted.   Scattered scarring from previous acne rash on  the face back and upper extremities   Psychiatric: He has a normal mood and affect. His behavior is normal. Judgment and thought content normal.   Nursing note and vitals reviewed.      No results found for: WBC, HGB, PLT, CHOL, TRIG, HDL, LDLDIRECT, ALT, AST, NA, K, CREATININE, TSH, INR, HGBA1C   Labs obtained by his family physician from May were reviewed in detail.      ASSESSMENT AND PLAN    1.  Coronary heart disease status post bypass graft surgery.  Recent stress test was negative for ischemia.  Clinically he does well without any symptoms of angina, heart failure or arrhythmia.  His current medications will remain unchanged.  No further testing was advised.    2.  Dyslipidemia.  Her most recent cholesterol profile was reviewed.  Most notable for minimal elevation of his blood glucose slight elevation of his triglycerides.  We did talk in some detail today about carbohydrate limitation in his diet.    3.  Suspected TIA.  No symptoms to suggest recurrence.  He remains on aggressive antiplatelet therapy with aspirin and Plavix.    4.  Elevated PSA.  This is chronically followed by his urologist.    In summary then, he is stable from a cardiac perspective.  No limitations placed on his activity and follow-up will be in this office in 6 months.       Manjeet Salter MD  7/12/2019

## 2019-07-12 NOTE — PROGRESS NOTES
Cardiology Note       Reason for visit: Scheduled cardiology follow-up    Chief Complaint   Patient presents with   • Coronary Artery Disease   • Hypertension   • Dizziness   Dyslipidemia  Suspected TIA  Coronary bypass graft surgery.     HPI   Ko Shearer is a 78 y.o. male who presents no new cardiovascular complaint.  He denies anginal chest pain, palpitations or dyspnea.  He did suffer an injury while working on his boat with some mild head trauma without loss of consciousness and likely some left lateral chest wall discomfort.  He had symptoms of discomfort with deep breathing in his chest for about 2 weeks which is gradually resolved. No neurologic symptoms.  Good appetite.  Stable weight.    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    No change in medications.      Past Medical History:   Diagnosis Date   • Coronary artery disease    • Lipid disorder    • TIA (transient ischemic attack)      Past Surgical History:   Procedure Laterality Date   • CARDIAC SURGERY      CABG  2008     Social History     Social History Narrative   • No narrative on file     Family History   Problem Relation Age of Onset   • Heart disease Mother    • Heart failure Mother    • Cancer Father      Adhesive tape-silicones  Current Outpatient Prescriptions   Medication Sig Dispense Refill   • aspirin (ASPIR-81) 81 mg enteric coated tablet Take 81 mg by mouth daily.     • atorvastatin (LIPITOR) 20 mg tablet Take 1 tablet (20 mg total) by mouth daily. 90 tablet 3   • cholecalciferol, vitamin D3, (VITAMIN D3) 1,000 unit capsule Take 1 capsule by mouth daily.     • clopidogrel (PLAVIX) 75 mg tablet Take 1 tablet (75 mg total) by mouth daily. 90 tablet 3   • cyanocobalamin (vitamin B-12) 1,000 mcg tablet take 1 tablet by oral route  every day     • FISH OIL-omega-3 fatty acids (FISH OIL) 340-1,000 mg capsule Take by mouth 2 (two) times a day.     •  glucosamine sulfate 1,000 mg capsule No SIG Entered     • magnesium chloride 64 mg tablet,delayed release (DR/EC) Take by mouth daily.     • multivitamin capsule one capsule twice daily.     • pantoprazole (PROTONIX) 40 mg EC tablet Take 40 mg by mouth daily.       No current facility-administered medications for this visit.        Review of Systems   Cardiovascular: Negative for chest pain and dyspnea on exertion.   All other systems reviewed and are negative.    Objective   Vitals:    07/12/19 1137   BP: 108/60   Pulse: 81   SpO2: 95%   Weight 181.9 pounds    Physical Exam   Constitutional: He is oriented to person, place, and time. He appears well-developed and well-nourished.   HENT:   Head: Normocephalic and atraumatic.   Nose: Nose normal.   Eyes: Conjunctivae are normal. No scleral icterus.   Neck: Normal range of motion. Neck supple.   Cardiovascular: Normal rate, regular rhythm, normal heart sounds and intact distal pulses.  Exam reveals no friction rub.    No murmur heard.  Pulmonary/Chest: Effort normal and breath sounds normal. He has no rales.   Abdominal: Soft. Bowel sounds are normal. There is no tenderness. There is no rebound.   Musculoskeletal: Normal range of motion. He exhibits no edema or tenderness.   Neurological: He is oriented to person, place, and time. He has normal reflexes.   Skin: No rash noted.   Scattered scarring from previous acne rash on the face back and upper extremities   Psychiatric: He has a normal mood and affect. His behavior is normal. Judgment and thought content normal.   Nursing note and vitals reviewed.      No results found for: WBC, HGB, PLT, CHOL, TRIG, HDL, LDLDIRECT, ALT, AST, NA, K, CREATININE, TSH, INR, HGBA1C   Labs obtained by his family physician from May were reviewed in detail.      ASSESSMENT AND PLAN    1.  Coronary heart disease status post bypass graft surgery.  Recent stress test was negative for ischemia.  Clinically he does well without any symptoms  of angina, heart failure or arrhythmia.  His current medications will remain unchanged.  No further testing was advised.    2.  Dyslipidemia.  Her most recent cholesterol profile was reviewed.  Most notable for minimal elevation of his blood glucose slight elevation of his triglycerides.  We did talk in some detail today about carbohydrate limitation in his diet.    3.  Suspected TIA.  No symptoms to suggest recurrence.  He remains on aggressive antiplatelet therapy with aspirin and Plavix.    4.  Elevated PSA.  This is chronically followed by his urologist.    In summary then, he is stable from a cardiac perspective.  No limitations placed on his activity and follow-up will be in this office in 6 months.       Manjeet Salter MD  7/12/2019

## 2019-10-23 ENCOUNTER — TRANSCRIBE ORDERS (OUTPATIENT)
Dept: SCHEDULING | Age: 79
End: 2019-10-23

## 2019-10-23 DIAGNOSIS — N20.0 CALCULUS OF KIDNEY: ICD-10-CM

## 2019-10-23 DIAGNOSIS — N39.0 URINARY TRACT INFECTION, SITE NOT SPECIFIED: Primary | ICD-10-CM

## 2019-10-31 ENCOUNTER — HOSPITAL ENCOUNTER (OUTPATIENT)
Dept: RADIOLOGY | Facility: CLINIC | Age: 79
Discharge: HOME | End: 2019-10-31
Attending: UROLOGY
Payer: MEDICARE

## 2019-10-31 DIAGNOSIS — N20.0 CALCULUS OF KIDNEY: ICD-10-CM

## 2019-10-31 DIAGNOSIS — N39.0 URINARY TRACT INFECTION, SITE NOT SPECIFIED: ICD-10-CM

## 2019-10-31 PROCEDURE — 63600105 HC IODINE BASED CONTRAST: Performed by: UROLOGY

## 2019-10-31 PROCEDURE — 74178 CT ABD&PLV WO CNTR FLWD CNTR: CPT

## 2019-10-31 RX ADMIN — IOHEXOL 130 ML: 350 INJECTION, SOLUTION INTRAVENOUS at 09:16

## 2019-11-05 ENCOUNTER — TRANSCRIBE ORDERS (OUTPATIENT)
Dept: RADIOLOGY | Facility: CLINIC | Age: 79
End: 2019-11-05

## 2019-11-05 ENCOUNTER — HOSPITAL ENCOUNTER (OUTPATIENT)
Dept: RADIOLOGY | Facility: CLINIC | Age: 79
Discharge: HOME | End: 2019-11-05
Attending: UROLOGY
Payer: MEDICARE

## 2019-11-05 DIAGNOSIS — N20.0 CALCULUS OF KIDNEY: Primary | ICD-10-CM

## 2019-11-05 DIAGNOSIS — N20.0 CALCULUS OF KIDNEY: ICD-10-CM

## 2019-11-05 PROCEDURE — 74018 RADEX ABDOMEN 1 VIEW: CPT

## 2019-11-11 ENCOUNTER — TELEPHONE (OUTPATIENT)
Dept: CARDIOLOGY | Facility: CLINIC | Age: 79
End: 2019-11-11

## 2019-11-11 NOTE — TELEPHONE ENCOUNTER
Patient of Dr Salter- Patient's spouse calling. States she is not with patient he asked her to call .States her  had two teeth implants today and and he held plavix  One or two days. She was not sure which dentist , thinks medications he is to be started on are motrin 600 mg but not sure how often, how many day or  for how long .States an antibiotic also being ordered. Patient asked her to call to see when to go back on plavix and if okay to take motrin and antibiotic.States he was difficult to understand on the phone.    Attempted to call patient at  to get more information left VM to call back.    Last OV Dr Salter 7/12/19 sp cabg /dyslipidemia suspected TIA/     Question 1)when to resume plavix  2) okay to take motrin and new antibiotic    Spouse    Patient  thank you

## 2019-11-11 NOTE — TELEPHONE ENCOUNTER
I reviewed the below message with Dr. Salter and I spoke to the patient's wife.  The patient can go back on Plavix tomorrow and he can take Motrin and the antibiotic that was prescribed by the dentist.  She verbalized understanding.

## 2019-11-11 NOTE — TELEPHONE ENCOUNTER
Pt called back, he is having trouble with his phone, phone numb confirm, and pt trans to triage.     Pt can be reached at [phone]

## 2019-11-11 NOTE — TELEPHONE ENCOUNTER
Pt of Dr. Salter. Calling back office regarding dental procedure performed today. Underwent two dental implants with Dr. Glass. Was supposed to HOLD Plavix prior to the procedure, but forgot until today, so he HELD med this AM.    Procedure now complete, and no bleeding complications. Pt on his way to pharmacy to  two prescriptions - Penicillin and Motrin 600mg tablets. Not sure of frequency or duration of these two meds, as he has yet to  from the pharmacy.    Would like to know:  1.) When should he resume Plavix?  2.) Is it OK to take the Motrin 600mg tablets for a few days?  3.) Is it OK to take the PCN?    Pt asking for callback to his wife CELL, as he is having difficulty with his phone - [phone].    Thank you.

## 2019-11-22 RX ORDER — ATORVASTATIN CALCIUM 20 MG/1
20 TABLET, FILM COATED ORAL DAILY
Qty: 90 TABLET | Refills: 3 | Status: SHIPPED | OUTPATIENT
Start: 2019-11-22 | End: 2020-01-09 | Stop reason: ENTERED-IN-ERROR

## 2019-11-22 NOTE — TELEPHONE ENCOUNTER
Medicine Refill Request    Last Office Visit: Visit date not found  Next Office Visit: Visit date not found        Current Outpatient Medications:   •  aspirin (ASPIR-81) 81 mg enteric coated tablet, Take 81 mg by mouth daily., Disp: , Rfl:   •  atorvastatin (LIPITOR) 20 mg tablet, Take 1 tablet (20 mg total) by mouth daily., Disp: 90 tablet, Rfl: 3  •  cholecalciferol, vitamin D3, (VITAMIN D3) 1,000 unit capsule, Take 1 capsule by mouth daily., Disp: , Rfl:   •  clopidogrel (PLAVIX) 75 mg tablet, Take 1 tablet (75 mg total) by mouth daily., Disp: 90 tablet, Rfl: 3  •  cyanocobalamin (vitamin B-12) 1,000 mcg tablet, take 1 tablet by oral route  every day, Disp: , Rfl:   •  FISH OIL-omega-3 fatty acids (FISH OIL) 340-1,000 mg capsule, Take by mouth 2 (two) times a day., Disp: , Rfl:   •  glucosamine sulfate 1,000 mg capsule, No SIG Entered, Disp: , Rfl:   •  magnesium chloride 64 mg tablet,delayed release (DR/EC), Take by mouth daily., Disp: , Rfl:   •  multivitamin capsule, one capsule twice daily., Disp: , Rfl:   •  pantoprazole (PROTONIX) 40 mg EC tablet, Take 40 mg by mouth daily., Disp: , Rfl:       BP Readings from Last 3 Encounters:   07/12/19 108/60   01/10/19 120/62   07/10/18 100/60       Recent Lab results:  No results found for: CHOL, No results found for: HDL, No results found for: LDLCALC, No results found for: TRIG     No results found for: GLUCOSE, No results found for: HGBA1C      No results found for: CREATININE    No results found for: TSH

## 2019-12-18 ENCOUNTER — TELEPHONE (OUTPATIENT)
Dept: CARDIOLOGY | Facility: CLINIC | Age: 79
End: 2019-12-18

## 2019-12-18 RX ORDER — SIMVASTATIN 40 MG/1
40 TABLET, FILM COATED ORAL NIGHTLY
COMMUNITY
End: 2021-04-22

## 2019-12-18 RX ORDER — CHLORHEXIDINE GLUCONATE ORAL RINSE 1.2 MG/ML
1 SOLUTION DENTAL AS NEEDED
Refills: 1 | COMMUNITY
Start: 2019-12-03 | End: 2020-07-17

## 2019-12-18 RX ORDER — SULFAMETHOXAZOLE AND TRIMETHOPRIM 800; 160 MG/1; MG/1
1 TABLET ORAL
COMMUNITY
End: 2020-01-09 | Stop reason: ENTERED-IN-ERROR

## 2019-12-18 RX ORDER — CIPROFLOXACIN 500 MG/1
500 TABLET ORAL
COMMUNITY
End: 2020-01-09 | Stop reason: ENTERED-IN-ERROR

## 2019-12-18 RX ORDER — DOXYCYCLINE 100 MG/1
100 CAPSULE ORAL
COMMUNITY
End: 2020-01-09 | Stop reason: ENTERED-IN-ERROR

## 2019-12-18 RX ORDER — TAMSULOSIN HYDROCHLORIDE 0.4 MG/1
0.4 CAPSULE ORAL NIGHTLY
COMMUNITY
End: 2020-01-09 | Stop reason: ENTERED-IN-ERROR

## 2019-12-18 RX ORDER — IBUPROFEN 600 MG/1
1 TABLET ORAL EVERY 4 HOURS
Refills: 0 | COMMUNITY
Start: 2019-11-11 | End: 2020-01-09 | Stop reason: ENTERED-IN-ERROR

## 2019-12-18 RX ORDER — MECLIZINE HCL 12.5 MG 12.5 MG/1
12.5 TABLET ORAL
COMMUNITY
End: 2020-01-09 | Stop reason: ENTERED-IN-ERROR

## 2019-12-18 RX ORDER — AMOXICILLIN AND CLAVULANATE POTASSIUM 875; 125 MG/1; MG/1
1 TABLET, FILM COATED ORAL
COMMUNITY
End: 2020-01-09 | Stop reason: ENTERED-IN-ERROR

## 2019-12-18 RX ORDER — LEVOFLOXACIN 500 MG/1
500 TABLET, FILM COATED ORAL
COMMUNITY
End: 2020-01-09 | Stop reason: ENTERED-IN-ERROR

## 2019-12-18 RX ORDER — PENICILLIN V POTASSIUM 500 MG/1
2 TABLET, FILM COATED ORAL
Refills: 0 | COMMUNITY
Start: 2019-12-05 | End: 2020-01-09 | Stop reason: ENTERED-IN-ERROR

## 2019-12-18 RX ORDER — ACETAMINOPHEN AND CODEINE PHOSPHATE 300; 30 MG/1; MG/1
1-2 TABLET ORAL
Refills: 0 | COMMUNITY
Start: 2019-12-03 | End: 2020-01-09 | Stop reason: ENTERED-IN-ERROR

## 2019-12-18 NOTE — TELEPHONE ENCOUNTER
Avelina---I called the pt and his mailbox is full so I couldn't leave a message.  We can clear him but he's on Plavix because of a TIA so Dr Salter wants that addressed by the patients PCP or Neurology.  I'll try the patient again later.

## 2019-12-18 NOTE — TELEPHONE ENCOUNTER
Avelina---I just spoke to the patient.  I told him that we will fax a clearance letter over to Dr. Huang at [phone].  We did say that he can hold his aspirin 1 week prior.  He will speak to his primary care physician regarding his Plavix.  He does not see a neurologist.

## 2019-12-18 NOTE — TELEPHONE ENCOUNTER
"Patient of Dr Salter- Patient calling. States he is for a lithotripsy 12/30/19 w/Dr Ruben Huang at [facility] . States [facility] surgical coordinator instructed patient he will need to hold all vitamins and aspirin and plavix 7 days before procedure and \"few days\" after procedure.    HX coronary artery disease dyslipidemia Possible TIA      Called left message for [facility] Surgical coordinator to verify  Hold 7 days pre procedure and after.Patient states need cardiac clearance as well.Reqeusted a call back    Dr Huang   Last OV 7 12 19    States he want to take w Jennifer NP      Patient  [phone] States he is a  and cannot talk while driving can leave a message  "

## 2019-12-19 ENCOUNTER — OFFICE VISIT (OUTPATIENT)
Dept: CARDIOLOGY | Facility: CLINIC | Age: 79
End: 2019-12-19
Payer: MEDICARE

## 2019-12-19 ENCOUNTER — TELEPHONE (OUTPATIENT)
Dept: SCHEDULING | Facility: CLINIC | Age: 79
End: 2019-12-19

## 2019-12-19 DIAGNOSIS — Z95.1 S/P CABG (CORONARY ARTERY BYPASS GRAFT): Chronic | ICD-10-CM

## 2019-12-19 DIAGNOSIS — Z01.818 PRE-OP EVALUATION: ICD-10-CM

## 2019-12-19 DIAGNOSIS — R94.31 ABNORMAL EKG: Primary | ICD-10-CM

## 2019-12-19 PROCEDURE — 93000 ELECTROCARDIOGRAM COMPLETE: CPT | Performed by: INTERNAL MEDICINE

## 2019-12-19 PROCEDURE — 200200 PR NO CHARGE: Performed by: INTERNAL MEDICINE

## 2019-12-19 NOTE — TELEPHONE ENCOUNTER
CHARLESI- pt called stating that he is in traffic and running behind about 10 minutes    Pt can be reached at

## 2019-12-19 NOTE — PROGRESS NOTES
Updated ECG obtained.  This ECG was reviewed and compared to prior EKGs.  No change.  This will be forwarded to the operating physician.    Manjeet Salter MD

## 2019-12-31 ENCOUNTER — TELEPHONE (OUTPATIENT)
Dept: SCHEDULING | Facility: CLINIC | Age: 79
End: 2019-12-31

## 2019-12-31 NOTE — TELEPHONE ENCOUNTER
Pt is [facility] today's appt.  Pt states he has to [facility].  Pt states he has not had anymore chest pain since that last episode and is comfortable with waiting until his original appt on 1/17.  Pt can be reached at [phone].

## 2020-01-13 ENCOUNTER — ANESTHESIA EVENT (OUTPATIENT)
Dept: OPERATING ROOM | Facility: HOSPITAL | Age: 80
Setting detail: HOSPITAL OUTPATIENT SURGERY
End: 2020-01-13
Payer: MEDICARE

## 2020-01-13 ENCOUNTER — HOSPITAL ENCOUNTER (OUTPATIENT)
Facility: HOSPITAL | Age: 80
Setting detail: HOSPITAL OUTPATIENT SURGERY
Discharge: HOME | End: 2020-01-13
Attending: UROLOGY | Admitting: UROLOGY
Payer: MEDICARE

## 2020-01-13 ENCOUNTER — APPOINTMENT (OUTPATIENT)
Dept: RADIOLOGY | Facility: HOSPITAL | Age: 80
Setting detail: HOSPITAL OUTPATIENT SURGERY
End: 2020-01-13
Attending: UROLOGY
Payer: MEDICARE

## 2020-01-13 VITALS
WEIGHT: 173 LBS | TEMPERATURE: 97.9 F | DIASTOLIC BLOOD PRESSURE: 68 MMHG | OXYGEN SATURATION: 98 % | BODY MASS INDEX: 24.22 KG/M2 | HEIGHT: 71 IN | HEART RATE: 63 BPM | SYSTOLIC BLOOD PRESSURE: 122 MMHG | RESPIRATION RATE: 16 BRPM

## 2020-01-13 DIAGNOSIS — N20.0 CALCULUS, RENAL: ICD-10-CM

## 2020-01-13 PROCEDURE — C1758 CATHETER, URETERAL: HCPCS | Performed by: UROLOGY

## 2020-01-13 PROCEDURE — 71000002 HC PACU PHASE 2 INITIAL 30MIN: Performed by: UROLOGY

## 2020-01-13 PROCEDURE — 71000011 HC PACU PHASE 1 EA ADDL MIN: Performed by: UROLOGY

## 2020-01-13 PROCEDURE — 36100360 FL FLUOROSCOPY TECHNICAL ASSISTANCE

## 2020-01-13 PROCEDURE — 63600000 HC DRUGS/DETAIL CODE: Performed by: SURGERY

## 2020-01-13 PROCEDURE — 82365 CALCULUS SPECTROSCOPY: CPT | Performed by: UROLOGY

## 2020-01-13 PROCEDURE — 63600105 HC IODINE BASED CONTRAST: Performed by: UROLOGY

## 2020-01-13 PROCEDURE — 36000013 HC OR LEVEL 3 EA ADDL MIN: Performed by: UROLOGY

## 2020-01-13 PROCEDURE — 63600000 HC DRUGS/DETAIL CODE: Performed by: UROLOGY

## 2020-01-13 PROCEDURE — 25000000 HC PHARMACY GENERAL: Performed by: ANESTHESIOLOGY

## 2020-01-13 PROCEDURE — 0T778DZ DILATION OF LEFT URETER WITH INTRALUMINAL DEVICE, VIA NATURAL OR ARTIFICIAL OPENING ENDOSCOPIC: ICD-10-PCS | Performed by: UROLOGY

## 2020-01-13 PROCEDURE — 27200000 HC STERILE SUPPLY: Performed by: UROLOGY

## 2020-01-13 PROCEDURE — 37000001 HC ANESTHESIA GENERAL: Performed by: UROLOGY

## 2020-01-13 PROCEDURE — 71000001 HC PACU PHASE 1 INITIAL 30MIN: Performed by: UROLOGY

## 2020-01-13 PROCEDURE — 25800000 HC PHARMACY IV SOLUTIONS: Performed by: UROLOGY

## 2020-01-13 PROCEDURE — 74420 UROGRAPHY RTRGR +-KUB: CPT

## 2020-01-13 PROCEDURE — 71000012 HC PACU PHASE 2 EA ADDL MIN: Performed by: UROLOGY

## 2020-01-13 PROCEDURE — 36000003 HC OR LEVEL 3 INITIAL 30MIN: Performed by: UROLOGY

## 2020-01-13 PROCEDURE — C1769 GUIDE WIRE: HCPCS | Performed by: UROLOGY

## 2020-01-13 PROCEDURE — 63600000 HC DRUGS/DETAIL CODE: Performed by: ANESTHESIOLOGY

## 2020-01-13 PROCEDURE — C2617 STENT, NON-COR, TEM W/O DEL: HCPCS | Performed by: UROLOGY

## 2020-01-13 PROCEDURE — 0TF48ZZ FRAGMENTATION IN LEFT KIDNEY PELVIS, VIA NATURAL OR ARTIFICIAL OPENING ENDOSCOPIC: ICD-10-PCS | Performed by: UROLOGY

## 2020-01-13 DEVICE — STENT URETERAL 6FR 24CM: Type: IMPLANTABLE DEVICE | Site: URETER | Status: FUNCTIONAL

## 2020-01-13 RX ORDER — DEXTROSE 40 %
15-30 GEL (GRAM) ORAL AS NEEDED
Status: DISCONTINUED | OUTPATIENT
Start: 2020-01-13 | End: 2020-01-13 | Stop reason: HOSPADM

## 2020-01-13 RX ORDER — LIDOCAINE HYDROCHLORIDE 10 MG/ML
INJECTION, SOLUTION INFILTRATION; PERINEURAL AS NEEDED
Status: DISCONTINUED | OUTPATIENT
Start: 2020-01-13 | End: 2020-01-13 | Stop reason: SURG

## 2020-01-13 RX ORDER — EPHEDRINE SULFATE 50 MG/ML
INJECTION, SOLUTION INTRAVENOUS AS NEEDED
Status: DISCONTINUED | OUTPATIENT
Start: 2020-01-13 | End: 2020-01-13 | Stop reason: SURG

## 2020-01-13 RX ORDER — ACETAMINOPHEN 325 MG/1
650 TABLET ORAL EVERY 4 HOURS PRN
Status: CANCELLED | OUTPATIENT
Start: 2020-01-13

## 2020-01-13 RX ORDER — PHENAZOPYRIDINE HYDROCHLORIDE 95 MG/1
190 TABLET ORAL 3 TIMES DAILY PRN
Status: CANCELLED | OUTPATIENT
Start: 2020-01-13 | End: 2020-01-16

## 2020-01-13 RX ORDER — ONDANSETRON HYDROCHLORIDE 2 MG/ML
4 INJECTION, SOLUTION INTRAVENOUS
Status: DISCONTINUED | OUTPATIENT
Start: 2020-01-13 | End: 2020-01-13 | Stop reason: HOSPADM

## 2020-01-13 RX ORDER — SODIUM CHLORIDE 9 MG/ML
INJECTION, SOLUTION INTRAVENOUS CONTINUOUS
Status: DISCONTINUED | OUTPATIENT
Start: 2020-01-13 | End: 2020-01-13 | Stop reason: HOSPADM

## 2020-01-13 RX ORDER — MIDAZOLAM HYDROCHLORIDE 2 MG/2ML
INJECTION, SOLUTION INTRAMUSCULAR; INTRAVENOUS AS NEEDED
Status: DISCONTINUED | OUTPATIENT
Start: 2020-01-13 | End: 2020-01-13 | Stop reason: SURG

## 2020-01-13 RX ORDER — DIPHENHYDRAMINE HCL 50 MG/ML
12.5 VIAL (ML) INJECTION
Status: DISCONTINUED | OUTPATIENT
Start: 2020-01-13 | End: 2020-01-13 | Stop reason: HOSPADM

## 2020-01-13 RX ORDER — IBUPROFEN 200 MG
16-32 TABLET ORAL AS NEEDED
Status: DISCONTINUED | OUTPATIENT
Start: 2020-01-13 | End: 2020-01-13 | Stop reason: HOSPADM

## 2020-01-13 RX ORDER — TAMSULOSIN HYDROCHLORIDE 0.4 MG/1
0.4 CAPSULE ORAL DAILY
Qty: 30 CAPSULE | Refills: 0 | Status: SHIPPED | OUTPATIENT
Start: 2020-01-13 | End: 2020-07-17

## 2020-01-13 RX ORDER — ONDANSETRON HYDROCHLORIDE 2 MG/ML
INJECTION, SOLUTION INTRAVENOUS AS NEEDED
Status: DISCONTINUED | OUTPATIENT
Start: 2020-01-13 | End: 2020-01-13 | Stop reason: SURG

## 2020-01-13 RX ORDER — IBUPROFEN 200 MG
16-32 TABLET ORAL AS NEEDED
Status: CANCELLED | OUTPATIENT
Start: 2020-01-13

## 2020-01-13 RX ORDER — HYDROMORPHONE HYDROCHLORIDE 1 MG/ML
0.5 INJECTION, SOLUTION INTRAMUSCULAR; INTRAVENOUS; SUBCUTANEOUS
Status: DISCONTINUED | OUTPATIENT
Start: 2020-01-13 | End: 2020-01-13 | Stop reason: HOSPADM

## 2020-01-13 RX ORDER — ROCURONIUM BROMIDE 10 MG/ML
INJECTION, SOLUTION INTRAVENOUS AS NEEDED
Status: DISCONTINUED | OUTPATIENT
Start: 2020-01-13 | End: 2020-01-13 | Stop reason: SURG

## 2020-01-13 RX ORDER — METOPROLOL TARTRATE 1 MG/ML
INJECTION, SOLUTION INTRAVENOUS AS NEEDED
Status: DISCONTINUED | OUTPATIENT
Start: 2020-01-13 | End: 2020-01-13 | Stop reason: SURG

## 2020-01-13 RX ORDER — LABETALOL HCL 20 MG/4 ML
5 SYRINGE (ML) INTRAVENOUS AS NEEDED
Status: DISCONTINUED | OUTPATIENT
Start: 2020-01-13 | End: 2020-01-13 | Stop reason: HOSPADM

## 2020-01-13 RX ORDER — PROPOFOL 10 MG/ML
INJECTION, EMULSION INTRAVENOUS AS NEEDED
Status: DISCONTINUED | OUTPATIENT
Start: 2020-01-13 | End: 2020-01-13 | Stop reason: SURG

## 2020-01-13 RX ORDER — DEXTROSE 50 % IN WATER (D50W) INTRAVENOUS SYRINGE
25 AS NEEDED
Status: DISCONTINUED | OUTPATIENT
Start: 2020-01-13 | End: 2020-01-13 | Stop reason: HOSPADM

## 2020-01-13 RX ORDER — CEPHALEXIN 500 MG/1
500 CAPSULE ORAL 2 TIMES DAILY
Qty: 14 CAPSULE | Refills: 0 | Status: SHIPPED | OUTPATIENT
Start: 2020-01-13 | End: 2020-01-20

## 2020-01-13 RX ORDER — EPHEDRINE SULFATE/0.9% NACL/PF 50 MG/5 ML
10 SYRINGE (ML) INTRAVENOUS AS NEEDED
Status: DISCONTINUED | OUTPATIENT
Start: 2020-01-13 | End: 2020-01-13 | Stop reason: HOSPADM

## 2020-01-13 RX ORDER — FENTANYL CITRATE 50 UG/ML
50 INJECTION, SOLUTION INTRAMUSCULAR; INTRAVENOUS
Status: DISCONTINUED | OUTPATIENT
Start: 2020-01-13 | End: 2020-01-13 | Stop reason: HOSPADM

## 2020-01-13 RX ORDER — DEXTROSE 40 %
15-30 GEL (GRAM) ORAL AS NEEDED
Status: CANCELLED | OUTPATIENT
Start: 2020-01-13

## 2020-01-13 RX ORDER — KETOROLAC TROMETHAMINE 15 MG/ML
15 INJECTION, SOLUTION INTRAMUSCULAR; INTRAVENOUS EVERY 6 HOURS PRN
Status: DISCONTINUED | OUTPATIENT
Start: 2020-01-13 | End: 2020-01-13 | Stop reason: HOSPADM

## 2020-01-13 RX ORDER — DEXTROSE 50 % IN WATER (D50W) INTRAVENOUS SYRINGE
25 AS NEEDED
Status: CANCELLED | OUTPATIENT
Start: 2020-01-13

## 2020-01-13 RX ORDER — FENTANYL CITRATE 50 UG/ML
INJECTION, SOLUTION INTRAMUSCULAR; INTRAVENOUS AS NEEDED
Status: DISCONTINUED | OUTPATIENT
Start: 2020-01-13 | End: 2020-01-13 | Stop reason: SURG

## 2020-01-13 RX ADMIN — ONDANSETRON 4 MG: 2 INJECTION INTRAMUSCULAR; INTRAVENOUS at 13:59

## 2020-01-13 RX ADMIN — KETOROLAC TROMETHAMINE 15 MG: 15 INJECTION, SOLUTION INTRAMUSCULAR; INTRAVENOUS at 14:39

## 2020-01-13 RX ADMIN — METOPROLOL TARTRATE 5 MG: 5 INJECTION, SOLUTION INTRAVENOUS at 13:37

## 2020-01-13 RX ADMIN — SUGAMMADEX 200 MG: 100 INJECTION, SOLUTION INTRAVENOUS at 14:03

## 2020-01-13 RX ADMIN — LIDOCAINE HYDROCHLORIDE 5 ML: 10 INJECTION, SOLUTION INFILTRATION; PERINEURAL at 12:50

## 2020-01-13 RX ADMIN — EPHEDRINE SULFATE 10 MG: 50 INJECTION INTRAVENOUS at 13:16

## 2020-01-13 RX ADMIN — CEFTRIAXONE SODIUM 1 G: 2 INJECTION, POWDER, FOR SOLUTION INTRAMUSCULAR; INTRAVENOUS at 09:49

## 2020-01-13 RX ADMIN — EPHEDRINE SULFATE 5 MG: 50 INJECTION INTRAVENOUS at 13:04

## 2020-01-13 RX ADMIN — FENTANYL CITRATE 50 MCG: 50 INJECTION, SOLUTION INTRAMUSCULAR; INTRAVENOUS at 12:48

## 2020-01-13 RX ADMIN — ROCURONIUM BROMIDE 50 MG: 10 INJECTION, SOLUTION INTRAVENOUS at 12:49

## 2020-01-13 RX ADMIN — FENTANYL CITRATE 50 MCG: 50 INJECTION, SOLUTION INTRAMUSCULAR; INTRAVENOUS at 12:50

## 2020-01-13 RX ADMIN — PROPOFOL 180 MG: 10 INJECTION, EMULSION INTRAVENOUS at 12:50

## 2020-01-13 RX ADMIN — SODIUM CHLORIDE: 9 INJECTION, SOLUTION INTRAVENOUS at 09:49

## 2020-01-13 RX ADMIN — MIDAZOLAM HYDROCHLORIDE 2 MG: 1 INJECTION, SOLUTION INTRAMUSCULAR; INTRAVENOUS at 12:48

## 2020-01-13 RX ADMIN — FENTANYL CITRATE 50 MCG: 50 INJECTION, SOLUTION INTRAMUSCULAR; INTRAVENOUS at 13:37

## 2020-01-13 NOTE — ANESTHESIA PREPROCEDURE EVALUATION
Relevant Problems   CARDIOVASCULAR   (+) CAD in native artery   (+) Dyslipidemia      NEUROLOGY   (+) Hemispheric carotid artery syndrome       Anesthesia ROS/MED HX    Anesthesia History    Previous anesthetics  Pulmonary    asthma  Neuro/Psych    TIA   CVA  Cardiovascular   CAD  Hematological    anticoagulants  GI/Hepatic   GERD  Renal Disease   renal calculi  Endo/Other  History of cancer  Body Habitus: Normal       Past Surgical History:   Procedure Laterality Date   • COLECTOMY  1999    for diverticulitis   • CORONARY ARTERY BYPASS GRAFT  2008    2 v   • CYSTOSCOPY W/ URETERAL STENT PLACEMENT  2012   • ESOPHAGEAL DILATION      EGD w/ dilation x 2   • INGUINAL HERNIA REPAIR     • SKIN LESION EXCISION      several     CBC Results     No lab values to display.        CMP Results     No lab values to display.          Physical Exam    Airway   Mallampati: II   TM distance: >3 FB   Neck ROM: full  Cardiovascular - normal   Rhythm: regular   Rate: normalPulmonary - normal   clear to auscultation  Dental - normal        Anesthesia Plan    Plan: general    Technique: general endotracheal     Airway: direct visual laryngoscopy   ASA 3  Blood Products:   Use of Blood Products Discussed: No   Induction:    intravenous   Postop Plan:   Patient Disposition: inpatient floor planned admission   Pain Management: IV analgesics

## 2020-01-13 NOTE — OR SURGEON
Pre-Procedure patient identification:  I am the primary operating surgeon/proceduralist and I have identified the patient and confirmed laterality is left on 01/13/20 at 12:31 PM Ruben Huang MD  Phone Number: [phone]

## 2020-01-13 NOTE — DISCHARGE INSTRUCTIONS
Call [phone] ASAP after discharge to schedule appointment with Dr. Huang (urology) in 1 week      Ureteral Stent Implantation, Care After  Refer to this sheet in the next few weeks. These instructions provide you with information about caring for yourself after your procedure. Your health care provider may also give you more specific instructions. Your treatment has been planned according to current medical practices, but problems sometimes occur. Call your health care provider if you have any problems or questions after your procedure.  What can I expect after the procedure?  After the procedure, it is common to have:  · Nausea.  · Mild pain when you urinate. You may feel this pain in your lower back or lower abdomen. Pain should stop within a few minutes after you urinate. This may last for up to 1 week.  · A small amount of blood in your urine for several days.  Follow these instructions at home:    Medicines  · Take over-the-counter and prescription medicines only as told by your health care provider.  · If you were prescribed an antibiotic medicine, take it as told by your health care provider. Do not stop taking the antibiotic even if you start to feel better.  · Do not drive for 24 hours if you received a sedative.  · Do not drive or operate heavy machinery while taking prescription pain medicines.  Activity  · Return to your normal activities as told by your health care provider. Ask your health care provider what activities are safe for you.  · Do not lift anything that is heavier than 10 lb (4.5 kg). Follow this limit for 1 week after your procedure, or for as long as told by your health care provider.  General instructions  · Watch for any blood in your urine. Call your health care provider if the amount of blood in your urine increases.  · If you have a catheter:  ? Follow instructions from your health care provider about taking care of your catheter and collection bag.  ? Do not take baths,  swim, or use a hot tub until your health care provider approves.  · Drink enough fluid to keep your urine clear or pale yellow.  · Keep all follow-up visits as told by your health care provider. This is important.  Contact a health care provider if:  · You have pain that gets worse or does not get better with medicine, especially pain when you urinate.  · You have difficulty urinating.  · You feel nauseous or you vomit repeatedly during a period of more than 2 days after the procedure.  Get help right away if:  · Your urine is dark red or has blood clots in it.  · You are leaking urine (have incontinence).  · The end of the stent comes out of your urethra.  · You cannot urinate.  · You have sudden, sharp, or severe pain in your abdomen or lower back.  · You have a fever.  This information is not intended to replace advice given to you by your health care provider. Make sure you discuss any questions you have with your health care provider.  Document Released: 08/20/2014 Document Revised: 05/25/2017 Document Reviewed: 07/01/2016  v[employer] Interactive Patient Education © 2019 [employer]

## 2020-01-13 NOTE — ANESTHESIA PROCEDURE NOTES
Airway  Urgency: elective    Start Time: 1/13/2020 12:53 PM  Airway not difficult    General Information and Staff    Patient location during procedure: OR  Anesthesiologist: Richie Duarte MD  Performed: anesthesiologist     Indications and Patient Condition  Indications for airway management: anesthesia  Preoxygenated: yes  Patient position: sniffing  MILS not maintained throughout  Mask difficulty assessment: 1 - vent by mask    Final Airway Details  Final airway type: endotracheal airway      Successful airway: ETT  Cuffed: yes   Successful intubation technique: direct laryngoscopy  Facilitating devices/methods: intubating stylet  Endotracheal tube insertion site: oral  Blade: Dottie  Blade size: #3  ETT size (mm): 8.0  Cormack-Lehane Classification: grade IIa - partial view of glottis  Placement verified by: chest auscultation and capnometry   Measured from: lips  ETT to lips (cm): 24  Number of attempts at approach: 1  Number of other approaches attempted: 0  Atraumatic airway insertion

## 2020-01-13 NOTE — PERIOPERATIVE NURSING NOTE
Pt is hd stable on RA. Pt denies any pain or nausea.Pt tolerating PO intake and able to ambulate. Pt and wife verbalizes understanding and able to teach back discharge instructions. Pt able to void x2. PVR less then half of second void. Pt meets criteria for discharge to home.

## 2020-01-13 NOTE — OP NOTE
Operative Note     Preoperative Diagnosis:   1.  Recurrent urinary tract infections  2.  Left renal calculus    Postoperative Diagnosis:   Same    Procedure: Cystoscopy, bilateral retrograde pyelograms with intraoperative interpretation, left ureteroscopy, laser lithotripsy, stone manipulation and extraction, left ureteral stent placement    Surgeon: Ruben Huang MD     Assistants: Bayron Bowen MD; Lola Coleman DO    Anesthesia: General endotracheal anesthesia    Antibiotic(s): Ceftriaxone 1 g    Complications: None           Drains: 1. 6 Palestinian by 24 cm left side double-pigtail ureteral stent  2.  Patrick catheter    Specimens: Left renal calculus for analysis    Estimated Blood Loss:  No blood loss documented.    Findings:   1.  Cystourethroscopy the anterior urethra appeared normal, the prostate appeared to have mild trilobar hypertrophy with a high bladder neck.  There were no papillary tumors or masses within the bladder, however there was a moderate amount of trabeculation noted with some cellules present.  2.  Bilateral retrograde pyelograms failed to demonstrate any filling defects or hydronephrosis bilaterally  3.  Left-sided ureteroscopy revealed a 9 mm calculus within the left renal pelvis.  This was fragmented using laser energy.  4.  Successful deployment of a left-sided 6 Palestinian by 24 cm double-pigtail ureteral stent.        Disposition: PACU - hemodynamically stable.    Indications of Procedure: Mr. Shearer is a 79-year-old male with a history of recurrent UTIs and a left renal calculus.  Given these findings, he elected to undergo cystoscopy with bilateral retrograde pyelograms ureteroscopy laser lithotripsy and stent placement.  All risk benefits and alternatives were explained the patient and he signed informed consent.    Details of Procedure: Patient was identified by name and medical record number in the holding area.  Informed consent was confirmed in the chart.  Patient was  transported to the operating room and placed supine on the operative table.  Bilateral lower extremity compression devices were applied and in good working order.  Perioperative antibiotics were administered.  General endotracheal anesthesia was administered.  Patient was then positioned in dorsal lithotomy with care to pad all pressure points and bony prominences.  Patinet was prepped with Betadine and draped in sterile fashion.  Surgical timeout was performed and all parties were in agreement.    22 Jordanian rigid cystoscope was inserted via urethra with the above findings.  The right ureteral orifice was identified and cannulated with a 6 Jordanian open-ended catheter retrograde pyelogram was obtained noting no filling defects or hydronephrosis.  Our attention was then shifted to the right side where a guidewire was advanced to the left renal pelvis under fluoroscopic guidance.  The scope is removed and a dual-lumen catheter was advanced over the wire into the proximal ureter where a second guidewire was deployed.  This 1 of the wires was secured to the drapes or safety wire the other was utilized to advance a flex ex flexible ureteroscope to the proximal ureter under fluoroscopic guidance.  After direct visualization of the proximal ureter and renal pelvis was performed, a 9 mm calculus was noted to be in the upper pole of the left renal pelvis.  A 273 µm laser fiber was then advanced to the scope and the stone was fragmented using laser energy.  Once we are satisfied with our lithotripsy, an engage basket was utilized to extract a stone specimen for analysis.  Retrograde pyelogram was obtained on the way out.  A 6 Jordanian by 24 cm double-pigtail ureteral stent was advanced to the renal pelvis under fluoroscopic guidance noting an adequate proximal distal pigtail curl after deployment.  A Patrick catheter was then placed    Patient was then undraped and cleaned placed back in supine position.  Patient was awoke from  anesthesia without incident and transferred to PACU in stable condition.    Ruben Huang MD was present and performed all critical portions of this procedure.

## 2020-01-13 NOTE — PERIOPERATIVE NURSING NOTE
Patient tolerating PO intake, urinated 25 cc, verbalized an understanding of his discharge instructions

## 2020-01-17 ENCOUNTER — OFFICE VISIT (OUTPATIENT)
Dept: CARDIOLOGY | Facility: CLINIC | Age: 80
End: 2020-01-17
Payer: MEDICARE

## 2020-01-17 VITALS
WEIGHT: 181 LBS | HEART RATE: 87 BPM | HEIGHT: 71 IN | SYSTOLIC BLOOD PRESSURE: 118 MMHG | DIASTOLIC BLOOD PRESSURE: 60 MMHG | BODY MASS INDEX: 25.34 KG/M2

## 2020-01-17 DIAGNOSIS — G45.1 HEMISPHERIC CAROTID ARTERY SYNDROME: Chronic | ICD-10-CM

## 2020-01-17 DIAGNOSIS — Z95.1 S/P CABG (CORONARY ARTERY BYPASS GRAFT): Primary | Chronic | ICD-10-CM

## 2020-01-17 DIAGNOSIS — K59.00 CONSTIPATION, UNSPECIFIED CONSTIPATION TYPE: ICD-10-CM

## 2020-01-17 DIAGNOSIS — E78.5 DYSLIPIDEMIA: Chronic | ICD-10-CM

## 2020-01-17 LAB
COMPN STONE: NORMAL
COMPN STONE: NORMAL
NIDUS STONE QL: NORMAL
ORIGIN STONE: NORMAL
WT STONE: 0 G

## 2020-01-17 PROCEDURE — 99214 OFFICE O/P EST MOD 30 MIN: CPT | Performed by: INTERNAL MEDICINE

## 2020-01-17 PROCEDURE — 93000 ELECTROCARDIOGRAM COMPLETE: CPT | Performed by: INTERNAL MEDICINE

## 2020-01-17 RX ORDER — ATORVASTATIN CALCIUM 20 MG/1
20 TABLET, FILM COATED ORAL DAILY
COMMUNITY
End: 2020-11-16

## 2020-01-17 ASSESSMENT — ENCOUNTER SYMPTOMS
FREQUENCY: 1
HEMATURIA: 1
POOR WOUND HEALING: 0
WEAKNESS: 0
ALLERGIC/IMMUNOLOGIC NEGATIVE: 1
DYSURIA: 0
DOUBLE VISION: 0
JOINT SWELLING: 0
HOARSE VOICE: 0
ENDOCRINE NEGATIVE: 1
ABDOMINAL PAIN: 0
LIGHT-HEADEDNESS: 0
PARESTHESIAS: 0
PSYCHIATRIC NEGATIVE: 1
BLURRED VISION: 0
SHORTNESS OF BREATH: 0
WHEEZING: 0
DYSPNEA ON EXERTION: 0
BRUISES/BLEEDS EASILY: 0
EXCESSIVE APPETITE: 0
DECREASED APPETITE: 0
CONSTIPATION: 0
DIZZINESS: 0
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
## 2020-05-08 RX ORDER — CLOPIDOGREL BISULFATE 75 MG/1
75 TABLET ORAL DAILY
Qty: 90 TABLET | Refills: 3 | Status: SHIPPED | OUTPATIENT
Start: 2020-05-08 | End: 2021-02-23 | Stop reason: SDUPTHER

## 2020-06-02 ENCOUNTER — TRANSCRIBE ORDERS (OUTPATIENT)
Dept: SCHEDULING | Age: 80
End: 2020-06-02

## 2020-06-02 DIAGNOSIS — N20.0 CALCULUS OF KIDNEY: Primary | ICD-10-CM

## 2020-06-11 ENCOUNTER — HOSPITAL ENCOUNTER (OUTPATIENT)
Dept: RADIOLOGY | Facility: CLINIC | Age: 80
Discharge: HOME | End: 2020-06-11
Attending: UROLOGY
Payer: MEDICARE

## 2020-06-11 ENCOUNTER — TRANSCRIBE ORDERS (OUTPATIENT)
Dept: RADIOLOGY | Facility: CLINIC | Age: 80
End: 2020-06-11

## 2020-06-11 ENCOUNTER — HOSPITAL ENCOUNTER (OUTPATIENT)
Dept: RADIOLOGY | Facility: CLINIC | Age: 80
Discharge: HOME | End: 2020-06-11
Attending: FAMILY MEDICINE
Payer: MEDICARE

## 2020-06-11 DIAGNOSIS — N20.0 CALCULUS OF KIDNEY: ICD-10-CM

## 2020-06-11 DIAGNOSIS — M50.30 OTHER CERVICAL DISC DEGENERATION, UNSPECIFIED CERVICAL REGION: Primary | ICD-10-CM

## 2020-06-11 DIAGNOSIS — M50.30 OTHER CERVICAL DISC DEGENERATION, UNSPECIFIED CERVICAL REGION: ICD-10-CM

## 2020-06-11 PROCEDURE — 76770 US EXAM ABDO BACK WALL COMP: CPT

## 2020-06-11 PROCEDURE — 72110 X-RAY EXAM L-2 SPINE 4/>VWS: CPT

## 2020-06-11 PROCEDURE — 72050 X-RAY EXAM NECK SPINE 4/5VWS: CPT

## 2020-07-17 ENCOUNTER — OFFICE VISIT (OUTPATIENT)
Dept: CARDIOLOGY | Facility: CLINIC | Age: 80
End: 2020-07-17
Payer: MEDICARE

## 2020-07-17 VITALS
WEIGHT: 189 LBS | SYSTOLIC BLOOD PRESSURE: 100 MMHG | HEIGHT: 71 IN | BODY MASS INDEX: 26.46 KG/M2 | DIASTOLIC BLOOD PRESSURE: 60 MMHG

## 2020-07-17 DIAGNOSIS — I25.10 CORONARY ARTERY DISEASE INVOLVING NATIVE CORONARY ARTERY OF NATIVE HEART WITHOUT ANGINA PECTORIS: Primary | ICD-10-CM

## 2020-07-17 DIAGNOSIS — Z87.891 FORMER TOBACCO USE: ICD-10-CM

## 2020-07-17 DIAGNOSIS — E78.5 DYSLIPIDEMIA: Chronic | ICD-10-CM

## 2020-07-17 DIAGNOSIS — Z95.1 S/P CABG (CORONARY ARTERY BYPASS GRAFT): Chronic | ICD-10-CM

## 2020-07-17 DIAGNOSIS — G45.1 HEMISPHERIC CAROTID ARTERY SYNDROME: Chronic | ICD-10-CM

## 2020-07-17 PROBLEM — K57.90 DIVERTICULAR DISEASE: Status: ACTIVE | Noted: 2020-07-17

## 2020-07-17 PROBLEM — K59.00 CONSTIPATION: Status: ACTIVE | Noted: 2020-07-17

## 2020-07-17 PROBLEM — K64.8 INTERNAL HEMORRHOIDS: Status: ACTIVE | Noted: 2020-07-17

## 2020-07-17 PROCEDURE — 93000 ELECTROCARDIOGRAM COMPLETE: CPT | Performed by: INTERNAL MEDICINE

## 2020-07-17 PROCEDURE — 99214 OFFICE O/P EST MOD 30 MIN: CPT | Performed by: INTERNAL MEDICINE

## 2020-07-17 ASSESSMENT — ENCOUNTER SYMPTOMS
DYSURIA: 0
PARESTHESIAS: 0
WHEEZING: 0
FREQUENCY: 0
JOINT SWELLING: 0
ENDOCRINE NEGATIVE: 1
FEVER: 0
DECREASED APPETITE: 0
HEARTBURN: 0
LIGHT-HEADEDNESS: 0
ABDOMINAL PAIN: 0
HEMATURIA: 0
CONSTIPATION: 0
DYSPNEA ON EXERTION: 0
PSYCHIATRIC NEGATIVE: 1
POOR WOUND HEALING: 0
WEAKNESS: 0
HOARSE VOICE: 0
BRUISES/BLEEDS EASILY: 0
EXCESSIVE APPETITE: 0
SHORTNESS OF BREATH: 0
DIZZINESS: 0
BLURRED VISION: 0
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
## 2021-01-07 ENCOUNTER — TELEPHONE (OUTPATIENT)
Dept: CARDIOLOGY | Facility: CLINIC | Age: 81
End: 2021-01-07

## 2021-01-15 ENCOUNTER — OFFICE VISIT (OUTPATIENT)
Dept: CARDIOLOGY | Facility: CLINIC | Age: 81
End: 2021-01-15
Payer: MEDICARE

## 2021-01-15 VITALS
OXYGEN SATURATION: 94 % | HEART RATE: 65 BPM | DIASTOLIC BLOOD PRESSURE: 72 MMHG | SYSTOLIC BLOOD PRESSURE: 122 MMHG | BODY MASS INDEX: 26.15 KG/M2 | WEIGHT: 186.8 LBS | HEIGHT: 71 IN

## 2021-01-15 DIAGNOSIS — G45.1 HEMISPHERIC CAROTID ARTERY SYNDROME: Chronic | ICD-10-CM

## 2021-01-15 DIAGNOSIS — E78.5 DYSLIPIDEMIA: Chronic | ICD-10-CM

## 2021-01-15 DIAGNOSIS — Z95.1 S/P CABG (CORONARY ARTERY BYPASS GRAFT): Primary | Chronic | ICD-10-CM

## 2021-01-15 PROCEDURE — 99213 OFFICE O/P EST LOW 20 MIN: CPT | Performed by: INTERNAL MEDICINE

## 2021-01-15 ASSESSMENT — ENCOUNTER SYMPTOMS
POOR WOUND HEALING: 0
DYSPNEA ON EXERTION: 0
FREQUENCY: 0
ENDOCRINE NEGATIVE: 1
WEAKNESS: 0
FEVER: 0
HEARTBURN: 0
JOINT SWELLING: 0
DYSURIA: 0
CONSTIPATION: 0
HEMATURIA: 0
PSYCHIATRIC NEGATIVE: 1
HOARSE VOICE: 0
DECREASED APPETITE: 0
WHEEZING: 0
LIGHT-HEADEDNESS: 0
PARESTHESIAS: 0
SHORTNESS OF BREATH: 0
DIZZINESS: 0
EXCESSIVE APPETITE: 0
ABDOMINAL PAIN: 0
BRUISES/BLEEDS EASILY: 0
BLURRED VISION: 0
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
## 2021-02-04 ENCOUNTER — IMMUNIZATIONS (OUTPATIENT)
Dept: FAMILY MEDICINE CLINIC | Facility: HOSPITAL | Age: 81
End: 2021-02-04

## 2021-02-04 DIAGNOSIS — Z23 ENCOUNTER FOR IMMUNIZATION: Primary | ICD-10-CM

## 2021-02-04 PROCEDURE — 91300 SARS-COV-2 / COVID-19 MRNA VACCINE (PFIZER-BIONTECH) 30 MCG: CPT

## 2021-02-04 PROCEDURE — 0001A SARS-COV-2 / COVID-19 MRNA VACCINE (PFIZER-BIONTECH) 30 MCG: CPT

## 2021-02-23 ENCOUNTER — TELEPHONE (OUTPATIENT)
Dept: SCHEDULING | Facility: CLINIC | Age: 81
End: 2021-02-23

## 2021-02-23 RX ORDER — CLOPIDOGREL BISULFATE 75 MG/1
75 TABLET ORAL DAILY
Qty: 14 TABLET | Refills: 0 | Status: SHIPPED | OUTPATIENT
Start: 2021-02-23 | End: 2021-03-16 | Stop reason: SDUPTHER

## 2021-02-23 NOTE — TELEPHONE ENCOUNTER
Medicine Refill Request    Last Office Visit: Visit date not found  Last Telemedicine Visit: Visit date not found    Next Office Visit: Visit date not found  Next Telemedicine Visit: Visit date not found   escribe a short supply of Clopidgrel 75mg daily to [facility] in [place]    Current Outpatient Medications:   •  aspirin (ASPIR-81) 81 mg enteric coated tablet, Take 81 mg by mouth daily., Disp: , Rfl:   •  atorvastatin (LIPITOR) 20 mg tablet, Take 1 tablet (20 mg total) by mouth once daily., Disp: 90 tablet, Rfl: 3  •  cholecalciferol, vitamin D3, (VITAMIN D3) 1,000 unit capsule, Take 3 capsules by mouth daily.  , Disp: , Rfl:   •  clopidogreL (PLAVIX) 75 mg tablet, Take 1 tablet (75 mg total) by mouth daily., Disp: 90 tablet, Rfl: 3  •  cyanocobalamin (vitamin B-12) 1,000 mcg tablet, Take 1,000 mcg by mouth daily.  , Disp: , Rfl:   •  FISH OIL-omega-3 fatty acids (FISH OIL) 340-1,000 mg capsule, Take 1 capsule by mouth daily.  , Disp: , Rfl:   •  magnesium chloride 64 mg tablet,delayed release (DR/EC), Take by mouth daily., Disp: , Rfl:   •  multivitamin capsule, Take 1 capsule by mouth daily.  , Disp: , Rfl:   •  pantoprazole (PROTONIX) 40 mg EC tablet, Take 40 mg by mouth every morning.  , Disp: , Rfl:   •  simvastatin (ZOCOR) 40 mg tablet, Take 40 mg by mouth nightly. Take as directed , Disp: , Rfl:       BP Readings from Last 3 Encounters:   01/15/21 122/72   07/17/20 100/60   01/17/20 118/60       Recent Lab results:  No results found for: CHOL, No results found for: HDL, No results found for: LDLCALC, No results found for: TRIG     No results found for: GLUCOSE, No results found for: HGBA1C      No results found for: CREATININE    No results found for: TSH

## 2021-02-25 ENCOUNTER — IMMUNIZATIONS (OUTPATIENT)
Dept: FAMILY MEDICINE CLINIC | Facility: HOSPITAL | Age: 81
End: 2021-02-25

## 2021-02-25 DIAGNOSIS — Z23 ENCOUNTER FOR IMMUNIZATION: Primary | ICD-10-CM

## 2021-02-25 PROCEDURE — 91300 SARS-COV-2 / COVID-19 MRNA VACCINE (PFIZER-BIONTECH) 30 MCG: CPT

## 2021-02-25 PROCEDURE — 0002A SARS-COV-2 / COVID-19 MRNA VACCINE (PFIZER-BIONTECH) 30 MCG: CPT

## 2021-03-16 RX ORDER — CLOPIDOGREL BISULFATE 75 MG/1
75 TABLET ORAL DAILY
Qty: 90 TABLET | Refills: 3 | Status: SHIPPED | OUTPATIENT
Start: 2021-03-16 | End: 2021-03-23 | Stop reason: SDUPTHER

## 2021-03-16 NOTE — TELEPHONE ENCOUNTER
Medicine Refill Request    Last Office Visit: Visit date not found  Last Telemedicine Visit: Visit date not found    clopidogreL (PLAVIX) 75 mg tablet    Pt wife Jessica requesting #[phone] refill.        Current Outpatient Medications:   •  aspirin (ASPIR-81) 81 mg enteric coated tablet, Take 81 mg by mouth daily., Disp: , Rfl:   •  atorvastatin (LIPITOR) 20 mg tablet, Take 1 tablet (20 mg total) by mouth once daily., Disp: 90 tablet, Rfl: 3  •  cholecalciferol, vitamin D3, (VITAMIN D3) 1,000 unit capsule, Take 3 capsules by mouth daily.  , Disp: , Rfl:   •  clopidogreL (PLAVIX) 75 mg tablet, Take 1 tablet (75 mg total) by mouth daily., Disp: 14 tablet, Rfl: 0  •  cyanocobalamin (vitamin B-12) 1,000 mcg tablet, Take 1,000 mcg by mouth daily.  , Disp: , Rfl:   •  FISH OIL-omega-3 fatty acids (FISH OIL) 340-1,000 mg capsule, Take 1 capsule by mouth daily.  , Disp: , Rfl:   •  magnesium chloride 64 mg tablet,delayed release (DR/EC), Take by mouth daily., Disp: , Rfl:   •  multivitamin capsule, Take 1 capsule by mouth daily.  , Disp: , Rfl:   •  pantoprazole (PROTONIX) 40 mg EC tablet, Take 40 mg by mouth every morning.  , Disp: , Rfl:   •  simvastatin (ZOCOR) 40 mg tablet, Take 40 mg by mouth nightly. Take as directed , Disp: , Rfl:       BP Readings from Last 3 Encounters:   01/15/21 122/72   07/17/20 100/60   01/17/20 118/60       Recent Lab results:  No results found for: CHOL, No results found for: HDL, No results found for: LDLCALC, No results found for: TRIG     No results found for: GLUCOSE, No results found for: HGBA1C      No results found for: CREATININE    No results found for: TSH

## 2021-03-23 ENCOUNTER — TELEPHONE (OUTPATIENT)
Dept: SCHEDULING | Facility: CLINIC | Age: 81
End: 2021-03-23

## 2021-03-23 RX ORDER — CLOPIDOGREL BISULFATE 75 MG/1
75 TABLET ORAL DAILY
Qty: 90 TABLET | Refills: 3 | Status: ON HOLD | OUTPATIENT
Start: 2021-03-23 | End: 2021-08-27 | Stop reason: SDUPTHER

## 2021-03-23 NOTE — TELEPHONE ENCOUNTER
Last Rx sent with request for Apotex , but will resend again with comment of no substitutions.    Thank you.

## 2021-03-23 NOTE — TELEPHONE ENCOUNTER
Jessica, spouse requesting Rx refill of Plavix 75 mg tablet, but needs script to state; Apotex  needed only, no substitutions. Jessica states when previous plavix rx was sent, not was not put on prescription, and jessica canceled script.    Pt uses [facility]. [facility]"" pharmacy    [phone]

## 2021-04-09 ENCOUNTER — TRANSCRIBE ORDERS (OUTPATIENT)
Dept: SCHEDULING | Age: 81
End: 2021-04-09

## 2021-04-09 DIAGNOSIS — N28.1 CYST OF KIDNEY, ACQUIRED: Primary | ICD-10-CM

## 2021-04-09 DIAGNOSIS — N39.0 URINARY TRACT INFECTION, SITE NOT SPECIFIED: Primary | ICD-10-CM

## 2021-04-14 ENCOUNTER — HOSPITAL ENCOUNTER (OUTPATIENT)
Dept: RADIOLOGY | Facility: HOSPITAL | Age: 81
Discharge: HOME | End: 2021-04-14
Attending: UROLOGY
Payer: MEDICARE

## 2021-04-14 DIAGNOSIS — N39.0 URINARY TRACT INFECTION, SITE NOT SPECIFIED: ICD-10-CM

## 2021-04-14 PROCEDURE — 76770 US EXAM ABDO BACK WALL COMP: CPT

## 2021-04-30 ENCOUNTER — HOSPITAL ENCOUNTER (OUTPATIENT)
Dept: RADIOLOGY | Facility: HOSPITAL | Age: 81
Discharge: HOME | End: 2021-04-30
Attending: UROLOGY
Payer: MEDICARE

## 2021-04-30 DIAGNOSIS — N28.1 CYST OF KIDNEY, ACQUIRED: ICD-10-CM

## 2021-04-30 PROCEDURE — 74178 CT ABD&PLV WO CNTR FLWD CNTR: CPT

## 2021-04-30 PROCEDURE — 63600105 HC IODINE BASED CONTRAST: Performed by: UROLOGY

## 2021-04-30 RX ADMIN — IOHEXOL 115 ML: 300 INJECTION, SOLUTION INTRAVENOUS at 13:23

## 2021-06-01 ENCOUNTER — TELEPHONE (OUTPATIENT)
Dept: SCHEDULING | Facility: CLINIC | Age: 81
End: 2021-06-01

## 2021-06-01 NOTE — TELEPHONE ENCOUNTER
Called and spoke to patient.  Informed him of Dr. Salter's response that he can hold the clopidogrel.  I told him we are assuming that he can continue his aspirin as that is the only protection he has for his heart disease as well as his neurological disease with his history of either TIA/stroke.  Even with holding the clopidogrel his risk of a thromboembolic event is not 0 but needs to be held to do such a procedure so the benefit exceeds the risk at present.    He will make sure with the doctor doing the procedure that he can continue his aspirin.

## 2021-06-01 NOTE — TELEPHONE ENCOUNTER
On 6/8 pt is having a lumbar interlaminar epidural and possible repair and his doctor wants him to hold Plavix 6-7 days prior.  If pt is to hold 7 days, then he needs to start holding today. Pt inquiring if he is ok to hold the Plavix.  Pt can be reached at [phone], requests message is left if he doesn't answer.  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

## 2021-06-01 NOTE — TELEPHONE ENCOUNTER
Patient with CAD status post CABG and history of a possible TIA.  He had discomfort in December which Dr. Salter felt was not due to angina.  He has had no recurrence of his neurological symptoms on aspirin and clopidogrel.    For lumbar interlaminar epidural and possible repair on 6/7 and  is requesting a 6 to 7-day hold on the clopidogrel.  If he needs to hold the clopidogrel for 7 days he would need to start holding it today.    Will forward to Dr. Salter for response.

## 2021-07-13 NOTE — TELEPHONE ENCOUNTER
pt is having a lumbar interlaminar epidural on 7/22 and will like to a letter stating how long pt can hold plavix    Fax: [phone]    Anjali can be reached [phone]

## 2021-07-13 NOTE — TELEPHONE ENCOUNTER
Patient has been instructed that he can hold his Plavix 7 days prior to his epidural.  A letter has been faxed to the number below.

## 2021-07-16 ENCOUNTER — OFFICE VISIT (OUTPATIENT)
Dept: CARDIOLOGY | Facility: CLINIC | Age: 81
End: 2021-07-16
Payer: MEDICARE

## 2021-07-16 VITALS
OXYGEN SATURATION: 95 % | WEIGHT: 178 LBS | DIASTOLIC BLOOD PRESSURE: 64 MMHG | HEIGHT: 71 IN | SYSTOLIC BLOOD PRESSURE: 122 MMHG | BODY MASS INDEX: 24.92 KG/M2 | HEART RATE: 86 BPM

## 2021-07-16 DIAGNOSIS — I63.30 CEREBRAL INFARCTION DUE TO THROMBOSIS OF CEREBRAL ARTERY (CMS/HCC): ICD-10-CM

## 2021-07-16 DIAGNOSIS — Z95.1 S/P CABG (CORONARY ARTERY BYPASS GRAFT): Primary | Chronic | ICD-10-CM

## 2021-07-16 DIAGNOSIS — E78.5 DYSLIPIDEMIA: Chronic | ICD-10-CM

## 2021-07-16 PROCEDURE — 99214 OFFICE O/P EST MOD 30 MIN: CPT | Performed by: INTERNAL MEDICINE

## 2021-07-16 PROCEDURE — 93000 ELECTROCARDIOGRAM COMPLETE: CPT | Performed by: INTERNAL MEDICINE

## 2021-07-16 ASSESSMENT — ENCOUNTER SYMPTOMS
BRUISES/BLEEDS EASILY: 0
FEVER: 0
ENDOCRINE NEGATIVE: 1
BLURRED VISION: 0
HEARTBURN: 0
JOINT SWELLING: 0
LIGHT-HEADEDNESS: 0
PND: 0
DECREASED APPETITE: 0
HOARSE VOICE: 0
ABDOMINAL PAIN: 0
MYALGIAS: 1
HEMATURIA: 0
FREQUENCY: 0
WEAKNESS: 0
ORTHOPNEA: 0
POOR WOUND HEALING: 0
SHORTNESS OF BREATH: 0
DYSPNEA ON EXERTION: 0
DEPRESSION: 1
DYSURIA: 0
EXCESSIVE APPETITE: 0
PARESTHESIAS: 0
BACK PAIN: 1
DIZZINESS: 0
CONSTIPATION: 0
WHEEZING: 0
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
## 2021-07-16 NOTE — PROGRESS NOTES
The patient's recent laboratory studies were retrieved and reviewed.  Excellent cholesterol profile.  Electrolytes normal and renal function stable.  These values will be added to the electronic medical record.    Manjeet Salter MD
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
## 2021-08-11 ENCOUNTER — TELEPHONE (OUTPATIENT)
Dept: SCHEDULING | Facility: CLINIC | Age: 81
End: 2021-08-11

## 2021-08-11 NOTE — TELEPHONE ENCOUNTER
I spoke to the patient to clarify if he is indeed using Dr. Rodas for his surgery.  His wife called this morning and it appears that he was going to see Dr. Pillai and then maybe get another opinion.  The patient told me to send the clearance letter to Dr. Rodas.    Marilin, a letter will be faxed on August 16 with Dr. Salter returns to the office.

## 2021-08-11 NOTE — TELEPHONE ENCOUNTER
Pt's spouse, Jessica, calling to follow up from last OV. Dr Salter recommended Dr Pillai for a second opinion on surgery. Jessica states surgery is 8/26; however, Dr Pillai is booking 6 weeks out and is on vacation next week. Jessica is wondering if Dr Salter had any other recommendations.     Please call Jessica at [phone].

## 2021-08-11 NOTE — TELEPHONE ENCOUNTER
Dr. Salter, I spoke to the patient's wife.  Her  needs a lumbar fusion.  Do you have any other recommendations besides Dr. Pillai?  I told her that I will call her back next week.

## 2021-08-11 NOTE — ANESTHESIA POSTPROCEDURE EVALUATION
Patient: Ko Shearer    Procedure Summary     Date:  01/13/20 Room / Location:  [facility] OR 07 / [facility] OR [facility]    Anesthesia Start:  1245 Anesthesia Stop:  1422    Procedure:  Cystoscopy, bilateral retrograde,  left ureteroscopy laser litho and stent(#[id number]) (N/A ) Diagnosis:       Calculus, renal      (Renal calculus)    Surgeon:  Ruben Huang MD Responsible Provider:  Richie Duarte MD    Anesthesia Type:  general ASA Status:  3          Anesthesia Type: general  PACU Vitals     No data found in the last 10 encounters.            Anesthesia Post Evaluation    Pain management: adequate  Patient location during evaluation: PACU  Patient participation: complete - patient participated  Level of consciousness: awake and alert  Cardiovascular status: acceptable  Airway Patency: adequate  Respiratory status: acceptable  Hydration status: acceptable  Anesthetic complications: no       Cimzia Counseling:  I discussed with the patient the risks of Cimzia including but not limited to immunosuppression, allergic reactions and infections.  The patient understands that monitoring is required including a PPD at baseline and must alert us or the primary physician if symptoms of infection or other concerning signs are noted.

## 2021-08-11 NOTE — TELEPHONE ENCOUNTER
Cardiac Clearance     Name of caller: Anjali     Relationship to patient: Raghu Orta     Name of patient: Ko Shearer    Name of physician: Manjeet Salter MD    Date of Surgery: 8/26/21    Type of Surgery: TLIF    Name of surgeon: Dr Carlo Rodas     Office contact number: [phone]    Office fax number: [phone]    Addendums  Is patient able to be cleared based on last appt on 7/16/21    If unable to clear patient, please contact patient at [phone]

## 2021-08-12 ENCOUNTER — TELEPHONE (OUTPATIENT)
Dept: SURGERY | Facility: CLINIC | Age: 81
End: 2021-08-12
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
## 2021-08-16 NOTE — TELEPHONE ENCOUNTER
I spoke to Mrs. Shearer.  I did send a clearance letter to Dr. Rodas with instructions that the patient should hold his Plavix 5 days prior to his surgery and remain on low-dose aspirin.  Mrs. Khoaclaude verbalized understanding.

## 2021-08-17 ENCOUNTER — TRANSCRIBE ORDERS (OUTPATIENT)
Dept: LAB | Facility: HOSPITAL | Age: 81
End: 2021-08-17

## 2021-08-17 ENCOUNTER — APPOINTMENT (OUTPATIENT)
Dept: PREADMISSION TESTING | Facility: HOSPITAL | Age: 81
End: 2021-08-17
Attending: ORTHOPAEDIC SURGERY
Payer: MEDICARE

## 2021-08-17 VITALS
BODY MASS INDEX: 25.16 KG/M2 | RESPIRATION RATE: 20 BRPM | OXYGEN SATURATION: 94 % | HEART RATE: 70 BPM | HEIGHT: 71 IN | TEMPERATURE: 97.8 F | SYSTOLIC BLOOD PRESSURE: 115 MMHG | WEIGHT: 179.7 LBS | DIASTOLIC BLOOD PRESSURE: 59 MMHG

## 2021-08-17 DIAGNOSIS — Z01.818 ENCOUNTER FOR OTHER PREPROCEDURAL EXAMINATION: Primary | ICD-10-CM

## 2021-08-17 DIAGNOSIS — Z11.59 ENCOUNTER FOR SCREENING FOR OTHER VIRAL DISEASES: ICD-10-CM

## 2021-08-17 DIAGNOSIS — Z86.73 HISTORY OF CVA (CEREBROVASCULAR ACCIDENT): ICD-10-CM

## 2021-08-17 DIAGNOSIS — N40.1 BENIGN PROSTATIC HYPERPLASIA WITH LOWER URINARY TRACT SYMPTOMS, SYMPTOM DETAILS UNSPECIFIED: ICD-10-CM

## 2021-08-17 DIAGNOSIS — M48.062 SPINAL STENOSIS, LUMBAR REGION WITH NEUROGENIC CLAUDICATION: Primary | ICD-10-CM

## 2021-08-17 DIAGNOSIS — M48.062 SPINAL STENOSIS, LUMBAR REGION WITH NEUROGENIC CLAUDICATION: ICD-10-CM

## 2021-08-17 DIAGNOSIS — R73.9 HYPERGLYCEMIA: ICD-10-CM

## 2021-08-17 DIAGNOSIS — Z01.818 ENCOUNTER FOR OTHER PREPROCEDURAL EXAMINATION: ICD-10-CM

## 2021-08-17 PROBLEM — N40.0 BPH (BENIGN PROSTATIC HYPERPLASIA): Status: ACTIVE | Noted: 2021-08-17

## 2021-08-17 LAB
ABO + RH BLD: NORMAL
ALBUMIN SERPL-MCNC: 3.7 G/DL (ref 3.4–5)
ALP SERPL-CCNC: 51 IU/L (ref 35–126)
ALT SERPL-CCNC: 26 IU/L (ref 16–63)
ANION GAP SERPL CALC-SCNC: 8 MEQ/L (ref 3–15)
AST SERPL-CCNC: 29 IU/L (ref 15–41)
BASOPHILS # BLD: 0.02 K/UL (ref 0.01–0.1)
BASOPHILS NFR BLD: 0.3 %
BILIRUB SERPL-MCNC: 0.9 MG/DL (ref 0.3–1.2)
BLD GP AB SCN SERPL QL: NEGATIVE
BLOOD BANK CMNT PATIENT-IMP: NORMAL
BUN SERPL-MCNC: 11 MG/DL (ref 8–20)
CALCIUM SERPL-MCNC: 9.3 MG/DL (ref 8.9–10.3)
CHLORIDE SERPL-SCNC: 106 MEQ/L (ref 98–109)
CO2 SERPL-SCNC: 24 MEQ/L (ref 22–32)
CREAT SERPL-MCNC: 1.2 MG/DL (ref 0.8–1.3)
D AG BLD QL: POSITIVE
DIFFERENTIAL METHOD BLD: ABNORMAL
EOSINOPHIL # BLD: 0.5 K/UL (ref 0.04–0.54)
EOSINOPHIL NFR BLD: 8.1 %
ERYTHROCYTE [DISTWIDTH] IN BLOOD BY AUTOMATED COUNT: 13.2 % (ref 11.6–14.4)
GFR SERPL CREATININE-BSD FRML MDRD: 58.6 ML/MIN/1.73M*2
GLUCOSE SERPL-MCNC: 143 MG/DL (ref 70–99)
HCT VFR BLDCO AUTO: 46.2 % (ref 40.1–51)
HGB BLD-MCNC: 15.7 G/DL (ref 13.7–17.5)
IMM GRANULOCYTES # BLD AUTO: 0.01 K/UL (ref 0–0.08)
IMM GRANULOCYTES NFR BLD AUTO: 0.2 %
LABORATORY COMMENT REPORT: NORMAL
LYMPHOCYTES # BLD: 1.41 K/UL (ref 1.2–3.5)
LYMPHOCYTES NFR BLD: 22.8 %
MCH RBC QN AUTO: 34.8 PG (ref 28–33.2)
MCHC RBC AUTO-ENTMCNC: 34 G/DL (ref 32.2–36.5)
MCV RBC AUTO: 102.4 FL (ref 83–98)
MONOCYTES # BLD: 1.01 K/UL (ref 0.3–1)
MONOCYTES NFR BLD: 16.3 %
NEUTROPHILS # BLD: 3.24 K/UL (ref 1.7–7)
NEUTS SEG NFR BLD: 52.3 %
NRBC BLD-RTO: 0 %
PDW BLD AUTO: 9.1 FL (ref 9.4–12.4)
PLATELET # BLD AUTO: 170 K/UL (ref 150–350)
POTASSIUM SERPL-SCNC: 4.1 MEQ/L (ref 3.6–5.1)
PROT SERPL-MCNC: 6.8 G/DL (ref 6–8.2)
PSA SERPL-MCNC: 9.59 NG/ML
RBC # BLD AUTO: 4.51 M/UL (ref 4.5–5.8)
SODIUM SERPL-SCNC: 138 MEQ/L (ref 136–144)
SPECIMEN EXP DATE BLD: NORMAL
WBC # BLD AUTO: 6.19 K/UL (ref 3.8–10.5)

## 2021-08-17 PROCEDURE — 85025 COMPLETE CBC W/AUTO DIFF WBC: CPT

## 2021-08-17 PROCEDURE — 80053 COMPREHEN METABOLIC PANEL: CPT

## 2021-08-17 PROCEDURE — 86901 BLOOD TYPING SEROLOGIC RH(D): CPT

## 2021-08-17 PROCEDURE — 84153 ASSAY OF PSA TOTAL: CPT | Performed by: HOSPITALIST

## 2021-08-17 PROCEDURE — 36415 COLL VENOUS BLD VENIPUNCTURE: CPT

## 2021-08-17 PROCEDURE — 99203 OFFICE O/P NEW LOW 30 MIN: CPT | Performed by: HOSPITALIST

## 2021-08-17 ASSESSMENT — PAIN SCALES - GENERAL: PAINLEVEL: 3
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
## 2021-08-17 NOTE — ASSESSMENT & PLAN NOTE
Difficult to assess exercise capacity as pt's activity levels limited by his back pain  Does have a hx of CAD s/p CABG, hx of CVA.   Reports mild SOB which is chronic  Seen by his cardiologist Dr. Salter for preop cardiovascular evaluation and deemed acceptable risk for surgery  Preop labs within acceptable limits    P:  Can proceed to surgery without additional testing
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
## 2021-08-17 NOTE — ASSESSMENT & PLAN NOTE
At increased risk for urinary retention post op. Monitor urine output closely post op for any signs of retention  Recommend early ambulation and aggressive bowel regimen on narcotics post op.

## 2021-08-17 NOTE — PRE-PROCEDURE INSTRUCTIONS
1. Admissions will call you with your arrival time on  8/25(day prior to surgery) between 2pm - 4pm. For questions about your arrival time, please call [phone].    2. Please report to the [facility] in the [place] on the day of your procedure. Enter the hospital through the [place] [facility] (main entrance at [address], [place]). If you are parking in the [facility], come to the ground floor of the garage and follow signs to the [facility]. Bring your insurance card and photo ID.     3. Please follow these fasting guidelines:  - STOP all solid food 8 hours prior to arrival.   - No more than 12oz of water is permitted and must STOP 2 HOURS prior to arrival to the hospital.     4. Early on the morning of the procedure, please take the following medications listed below with a sip of water, in addition to any medications prescribed by your surgeon:   Pantoprazole-Protonix-if you take in the morning      *NO aspirin, ibuprofen, anti-inflammatories, fish oil or Vitamin E unless ordered by physician.    *Stop taking Plavix-5 days prior to surgery per Dr Mcclain STOP fish oil today 8/17     5. Other instructions: antibacterial shower x 2 days prior to surgery 8/24 8/25 and the morning of surgery 8/26 , brush teeth    6. If you develop a cough, cold, fever, or other symptom prior to the date of the procedure, please report it to your physician immediately.    7. If you need to cancel the procedure for any reason, please contact your physician.    8. Make arrangements to have someone drive you home from the procedure. If you have not arranged for transportation home, your surgery may be cancelled.     9. You may not take public transportation or a cab unless accompanied by a responsible person.    10. You may not drive a car or operate complex or potentially dangerous machinery for 24 hours following anesthesia and/or sedation.    11. If it is medically necessary for you to have a  longer stay, you will be informed as soon as the decision is made.    12. Do not wear or bring anything of value to the hospital, including medication or jewelry of any kind. Do not wear make-up or contact lenses. Do bring your glasses and hearing aid, with a case. If you use a CPAP machine and will be overnight, please bring it with you. If you use any inhalers, please bring them as well.     13. Dress in comfortable clothes.    14. If instructed, please bring a copy of your Advance Directive (Living Will/Durable Power of ) on the day of your procedure.    15. Ensuring your safety at all times is a very important part of our [facility] Culture of Safety. After having surgery and sedation, you are at risk for falling and balance issues. Although you may feel awake, the effects of the medication can last up to 24 hours after anesthesia. If you need to use the bathroom during your recovery period, nursing staff will escort you there and stay with you to ensure your safety.    Pre operative instructions given as per protocol.  Form explained by:     Kathy Campoverde RN

## 2021-08-17 NOTE — ASSESSMENT & PLAN NOTE
Hx of CABG  On aspirin, plavix and statin  Per cardiology: can hold plavix 5 days preop and cont baby aspirin through surgery  Hold fish oil for now

## 2021-08-18 ENCOUNTER — OFFICE VISIT (OUTPATIENT)
Dept: SURGERY | Facility: CLINIC | Age: 81
End: 2021-08-18
Payer: MEDICARE

## 2021-08-18 VITALS
HEART RATE: 83 BPM | WEIGHT: 179 LBS | RESPIRATION RATE: 18 BRPM | HEIGHT: 71 IN | OXYGEN SATURATION: 97 % | BODY MASS INDEX: 25.06 KG/M2 | SYSTOLIC BLOOD PRESSURE: 148 MMHG | DIASTOLIC BLOOD PRESSURE: 82 MMHG

## 2021-08-18 DIAGNOSIS — M54.16 LUMBAR RADICULOPATHY: Primary | ICD-10-CM

## 2021-08-18 PROBLEM — R73.9 HYPERGLYCEMIA: Status: ACTIVE | Noted: 2021-08-18

## 2021-08-18 PROCEDURE — 99204 OFFICE O/P NEW MOD 45 MIN: CPT | Performed by: ORTHOPAEDIC SURGERY

## 2021-08-18 NOTE — ASSESSMENT & PLAN NOTE
No history of diabetes.  This is nonfasting blood work.  Recommended follow-up with primary care doctor for further evaluation if no recent diabetic screening. This can be done post op
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
## 2021-08-18 NOTE — DISCHARGE INSTRUCTIONS
Carlo Rodas MD  , Orthopaedic Spine Surgery  [facility]  [facility]  Alicia Rodriguez PA-C  Office: [phone]  Fax: [phone]    Spinal Fusion   Preoperative, Postoperative and Home Recovery Instructions    After Your Operation  Pain  After surgery you will experience pain in the region of the incision. Some leg pain as well as tingling or numbness may also be present. Initially it may be of greater intensity than pre-operatively but will subside over time as the healing process occurs. This discomfort is caused from surgical retraction of tissue as well as inflammation and swelling of the previously compressed nerves. This may occur for several weeks and is NORMAL. Persistent pain should be reported to your physician.  Some patients experience a sore throat and swallowing difficulty after general anesthesia and surgery. This is from manipulation of tissue and the presence of the breathing tube for anesthesia. The sore throat usually will subside within a week. The swallowing difficulty usually takes longer. Using throat lozenges or lemon drops, sipping cool liquids, or sucking ice chips may soothe this pain.    Use of Pain Medication  Narcotic pain medication will be available for pain relief after surgery. Narcotics are very effective for pain relief but may cause other side effects. The possible effects vary among patients and may include: sleepiness, nausea, constipation, flushing, sweating, and occasionally euphoria or confused feelings are possible. If these occur notify your nurse.   You may have a Patient Controlled Analgesia (PCA) pump. This is preferred by some but not all patients. When you depress a switch the pump will deliver narcotic pain medication directly into your IV without requiring repeated intramuscular injections. The PCA pump is closely monitored by the nursing and  anesthesiology staff. If you feel you may benefit from this  method of narcotic medication delivery, ask your nurse for a PCA pump trial.    Activity  Feel free to move about in your bed. IF YOU ARE GIVEN A BRACE, then the head of your bed is elevated above 60 degrees, or you are out of bed, your TLSO brace must be on. The nurse or therapist will assist you in getting out of bed a few hours after surgery. You will be instructed to be up walking every 2 to 3 hours during the day and evening. The nurse will allow you to do this independently once you are steady and feel comfortable.  Early activity after surgery is extremely important to help prevent the complications of prolonged bed rest such as pneumonia and blood clots. It also promotes recovery, relieves muscle stiffness, allows for development of a well-organized scar, and improves your outlook.  Do not start any programs of exercise or physical therapy unless discussed with your doctor.    Diet  Your diet will begin with clear liquids, and be advanced to your normal daily diet as soon as your condition permits. Your IV will be removed as soon as we are reasonably certain it will no longer be required for medications and hydration.    Bowel and Bladder Function  During surgery you may have a catheter (tube) in your bladder to monitor your urine output. Upon its removal you may feel a stinging sensation for 2 to 3 days, which is normal. Some patients may have difficulty urinating after surgery. If this occurs, notify your nurse who may assist you in voiding  techniques. This may require placing a catheter in your bladder.  After surgery, constipation frequently occurs from inactivity and the side effects of pain medication. Stool softeners and laxatives will be available from your nurse.    Respiratory Hygiene  Deep breathing is very important after surgery to maintain lung expansion and reduce the risk of pneumonia. You will be provided with an incentive spirometer and instructed about its use. This device should be used  every 15 to 30 minutes during your wakeful hours initially, then every 1 to 2 hours as your activity returns to normal. This device is yours to take home. Continue to use it at home for at least 1 week after your discharge. (Use it during TV commercial breaks).  Smoking is absolutely forbidden. There is clear evidence that smoking dramatically increases your risk of post-operative complications. There is also evidence that smoking adversely effects bone healing and nerve recovery. Second hand smoke also applies.    Use of Your Brace  A brace may be provided prior to surgery. It serves as a reminder and keeps your back supported. It also reduces discomfort and facilitates healing. The brace should fit snugly, yet comfortably. It should allow only minimal motion. Do not “fight” the brace; cooperate with it. This will assist in bone healing and minimize discomfort and skin irritation.    Home Recovery  Follow-Up Appointment  Patients are generally discharged from the hospital 1 to 2 days after surgery. A follow-up appointment was made for Dr Rodas's office 2 weeks from the date of surgery. At your first follow-up visit, you will be evaluated and the incision will be checked. You will then be seen at 6 weeks, 3 months, 6 months, 1 year and 2 years from surgery. X-rays will be taken at each visit to insure appropriate healing is taking place.     Incision Care and Hygiene  After the 3rd post-operative day, you are encouraged to shower daily. Do not soak your incision. Pat the incision dry. Do not apply any ointments or creams. Cover daily, for the first 5 days, with a clean dressing. A supply will be provided upon discharge. Surgical tapes or Steri-strips may be present to aid in holding the skin edges together. Allow these to fall off on their own. After five days post-operatively you no longer need to wear a bandage. NO BATHS, HOT TUBS, OR POOLS FOR 6 WEEKS AFTER SURGERY, it will increase your risk of infection.      Inflammation  Please take your temperature every afternoon for the first week after you are discharged from the hospital. Call your physician if:  1. Your temperature, taken by thermometer, is more than 101.5 degrees,  OR  2. Your incision becomes reddened, swollen or any increase or change in drainage occurs.    Nutrition  A well-balanced diet is necessary for good healing and recovery. This includes food from the four basic food groups: dairy products, meat, vegetables and fruit. Use of narcotic pain medication and prolonged rest may cause constipation. Drinking plenty of fluids and eating high fiber foods (whole grains, raw fruits and vegetables) will help regain normal bowel function.    Home Pain and Medication  When we surgically relieve pressure from an inflamed, damaged nerve it does not recover instantaneously. The surgical procedure does not heal the nerve, only the body is capable of that. The goal of surgery is to create the best possible environment for the body to heal itself and to prevent further damage. This will take a variable length of time depending on the duration and degree of nerve damage and the body’s own healing abilities. Most of the healing occurs in the first few months. Pain, weakness, or numbness lasting more than six months will likely be permanent.  Everyone has a different pain tolerance that will dictate the amount of pain medication required. A decreased dose and less frequent use of pain medication will occur during your recovery period. A gradual weaning of medications should begin as soon as possible, generally within 2 to 4 weeks. Conservative use of narcotic pain medication is advised. While using narcotic pain medication you SHOULD NOT drive. One should try non-narcotic medication, such as Tylenol and reserve narcotics for only the difficult times.     Do not take anti-inflammatory medicines such as Celebrex, Ibuprofen, Meloxicam, Advil, Aleve, or Motrin, as these may  affect your bone healing for 12 weeks following your surgery.   Narcotics will NOT be considered for refills at night or over the weekend, or holiday.    Home Activity  A well-balanced diet is necessary for good healing and recovery. This includes food from the four basic food groups: diary products, meat, vegetables and fruit.  The First Week  Early to bed, late to rise and frequent rest periods throughout the day. Get at least 8 hours of sleep each night. A disrupted sleep pattern is common after discharge from the hospital and will return to normal over time.   You may not drive, but you may be driven, for short distances, using proper restraints such as shoulder and lap belts FOR 4 WEEKS   No lifting of more than 10 pounds   May climb stairs with hand rail   Avoid sitting for longer than 20 minutes at a time   Begin a daily walking program with 1 to 2 blocks initially; schedule a daily time and increase distance daily.   Eat a regular, balanced diet.   Take medications as prescribed, using narcotics as needed. Avoid using NSAIDs such as Motrin or Advil. FOR 12 WEEKS.   The Second Week  Resume normal rising and retiring schedule, but continue to rest throughout the day.   Wear your brace as instructed.   You may not drive.   No lifting of anything weighing more than 10 pounds.   May climb stairs with hand rail   Continue scheduled walking, increasing distance and frequency as able.   May resume sexual relations when comfortable.   Begin narcotic weaning as pain diminishes, relying mainly on non-narcotic medications  The Third Week  Resume normal rising and retiring schedule, resting as needed.   May resume light work around the home; lifting not to exceed 10 pounds.   Continue scheduled walking.   Wear your brace as instructed.  The Fourth Week  Resume normal rising and retiring schedule, resting as needed.   May resume light work around the home; lifting not to exceed 10 pounds.   Continue scheduled walking.    Wear your brace as instructed.
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
## 2021-08-21 NOTE — TELEPHONE ENCOUNTER
Medicine Refill Request    Last Office Visit: Visit date not found  Last Telemedicine Visit: Visit date not found    Next Office Visit: Visit date not found  Next Telemedicine Visit: Visit date not found     Refill request   clopidogrel (PLAVIX) 75 mg tablet   Pharm #  [phone]        Current Outpatient Medications:   •  aspirin (ASPIR-81) 81 mg enteric coated tablet, Take 81 mg by mouth daily., Disp: , Rfl:   •  atorvastatin (LIPITOR) 20 mg tablet, Take 20 mg by mouth daily., Disp: , Rfl:   •  chlorhexidine (PERIDEX) 0.12 % solution, Swish and spit 1 mL as needed.  , Disp: , Rfl: 1  •  cholecalciferol, vitamin D3, (VITAMIN D3) 1,000 unit capsule, Take 1 capsule by mouth daily., Disp: , Rfl:   •  clopidogrel (PLAVIX) 75 mg tablet, Take 1 tablet (75 mg total) by mouth daily., Disp: 90 tablet, Rfl: 3  •  cyanocobalamin (vitamin B-12) 1,000 mcg tablet, Take 1,000 mcg by mouth daily.  , Disp: , Rfl:   •  FISH OIL-omega-3 fatty acids (FISH OIL) 340-1,000 mg capsule, Take by mouth 2 (two) times a day., Disp: , Rfl:   •  glucosamine sulfate 1,000 mg capsule, once daily.  , Disp: , Rfl:   •  magnesium chloride 64 mg tablet,delayed release (DR/EC), Take by mouth daily., Disp: , Rfl:   •  multivitamin capsule, Take 1 capsule by mouth daily.  , Disp: , Rfl:   •  pantoprazole (PROTONIX) 40 mg EC tablet, Take 40 mg by mouth every morning.  , Disp: , Rfl:   •  simvastatin (ZOCOR) 40 mg tablet, Take 40 mg by mouth nightly. Take as directed , Disp: , Rfl:   •  tamsulosin (FLOMAX) 0.4 mg capsule, Take 1 capsule (0.4 mg total) by mouth daily., Disp: 30 capsule, Rfl: 0      BP Readings from Last 3 Encounters:   01/17/20 118/60   01/13/20 122/68   07/12/19 108/60       Recent Lab results:  No results found for: CHOL, No results found for: HDL, No results found for: LDLCALC, No results found for: TRIG     No results found for: GLUCOSE, No results found for: HGBA1C      No results found for: CREATININE    No results found for: TSH  - Cont keppra 500 q12 (started on IV as NGT removed)  - Will followup w Dr. Beach outpt on discharge    #Left upper extremity swelling RESOLVED  -LUE US consistent with superficial thrombophlebitis

## 2021-08-23 ENCOUNTER — APPOINTMENT (OUTPATIENT)
Dept: LAB | Facility: HOSPITAL | Age: 81
End: 2021-08-23
Attending: ORTHOPAEDIC SURGERY
Payer: MEDICARE

## 2021-08-23 ENCOUNTER — TRANSCRIBE ORDERS (OUTPATIENT)
Dept: LAB | Facility: HOSPITAL | Age: 81
End: 2021-08-23

## 2021-08-23 ENCOUNTER — ANESTHESIA EVENT (OUTPATIENT)
Dept: OPERATING ROOM | Facility: HOSPITAL | Age: 81
Setting detail: HOSPITAL OUTPATIENT SURGERY
End: 2021-08-23
Payer: MEDICARE

## 2021-08-23 DIAGNOSIS — Z11.59 ENCOUNTER FOR SCREENING FOR OTHER VIRAL DISEASES: ICD-10-CM

## 2021-08-23 DIAGNOSIS — Z11.59 ENCOUNTER FOR SCREENING FOR OTHER VIRAL DISEASES: Primary | ICD-10-CM

## 2021-08-23 LAB — SARS-COV-2 RNA RESP QL NAA+PROBE: NEGATIVE

## 2021-08-23 PROCEDURE — U0003 INFECTIOUS AGENT DETECTION BY NUCLEIC ACID (DNA OR RNA); SEVERE ACUTE RESPIRATORY SYNDROME CORONAVIRUS 2 (SARS-COV-2) (CORONAVIRUS DISEASE [COVID-19]), AMPLIFIED PROBE TECHNIQUE, MAKING USE OF HIGH THROUGHPUT TECHNOLOGIES AS DESCRIBED BY CMS-2020-01-R: HCPCS

## 2021-08-26 ENCOUNTER — APPOINTMENT (OUTPATIENT)
Dept: RADIOLOGY | Facility: HOSPITAL | Age: 81
Setting detail: HOSPITAL OUTPATIENT SURGERY
End: 2021-08-26
Attending: ORTHOPAEDIC SURGERY
Payer: MEDICARE

## 2021-08-26 ENCOUNTER — ANESTHESIA (OUTPATIENT)
Dept: OPERATING ROOM | Facility: HOSPITAL | Age: 81
Setting detail: HOSPITAL OUTPATIENT SURGERY
End: 2021-08-26
Payer: MEDICARE

## 2021-08-26 ENCOUNTER — HOSPITAL ENCOUNTER (OUTPATIENT)
Facility: HOSPITAL | Age: 81
Discharge: HOME | End: 2021-08-28
Attending: ORTHOPAEDIC SURGERY | Admitting: ORTHOPAEDIC SURGERY
Payer: MEDICARE

## 2021-08-26 DIAGNOSIS — I25.10 CAD IN NATIVE ARTERY: ICD-10-CM

## 2021-08-26 DIAGNOSIS — Z95.1 S/P CABG (CORONARY ARTERY BYPASS GRAFT): ICD-10-CM

## 2021-08-26 DIAGNOSIS — Z98.890 STATUS POST LUMBAR SPINE SURGERY FOR DECOMPRESSION OF SPINAL CORD: Primary | ICD-10-CM

## 2021-08-26 LAB
GLUCOSE BLD-MCNC: 111 MG/DL (ref 70–99)
GLUCOSE BLD-MCNC: 117 MG/DL (ref 70–99)
POCT TEST: ABNORMAL
POCT TEST: ABNORMAL

## 2021-08-26 PROCEDURE — C1713 ANCHOR/SCREW BN/BN,TIS/BN: HCPCS | Performed by: ORTHOPAEDIC SURGERY

## 2021-08-26 PROCEDURE — 0SG00K1 FUSION OF LUMBAR VERTEBRAL JOINT WITH NONAUTOLOGOUS TISSUE SUBSTITUTE, POSTERIOR APPROACH, POSTERIOR COLUMN, OPEN APPROACH: ICD-10-PCS | Performed by: ORTHOPAEDIC SURGERY

## 2021-08-26 PROCEDURE — 25800000 HC PHARMACY IV SOLUTIONS: Performed by: NURSE PRACTITIONER

## 2021-08-26 PROCEDURE — 71000001 HC PACU PHASE 1 INITIAL 30MIN: Performed by: ORTHOPAEDIC SURGERY

## 2021-08-26 PROCEDURE — 63600000 HC DRUGS/DETAIL CODE: Performed by: ANESTHESIOLOGY

## 2021-08-26 PROCEDURE — 27200000 HC STERILE SUPPLY: Performed by: ORTHOPAEDIC SURGERY

## 2021-08-26 PROCEDURE — 36103120 FL FLUOROSCOPY TECHNICAL ASSISTANCE

## 2021-08-26 PROCEDURE — C1776 JOINT DEVICE (IMPLANTABLE): HCPCS | Performed by: ORTHOPAEDIC SURGERY

## 2021-08-26 PROCEDURE — 25800000 HC PHARMACY IV SOLUTIONS: Performed by: ORTHOPAEDIC SURGERY

## 2021-08-26 PROCEDURE — C1762 CONN TISS, HUMAN(INC FASCIA): HCPCS | Performed by: ORTHOPAEDIC SURGERY

## 2021-08-26 PROCEDURE — 0SB40ZZ EXCISION OF LUMBOSACRAL DISC, OPEN APPROACH: ICD-10-PCS | Performed by: ORTHOPAEDIC SURGERY

## 2021-08-26 PROCEDURE — 99225 PR SBSQ OBSERVATION CARE/DAY 25 MINUTES: CPT | Performed by: HOSPITALIST

## 2021-08-26 PROCEDURE — 71000011 HC PACU PHASE 1 EA ADDL MIN: Performed by: ORTHOPAEDIC SURGERY

## 2021-08-26 PROCEDURE — 0SG3071 FUSION OF LUMBOSACRAL JOINT WITH AUTOLOGOUS TISSUE SUBSTITUTE, POSTERIOR APPROACH, POSTERIOR COLUMN, OPEN APPROACH: ICD-10-PCS | Performed by: ORTHOPAEDIC SURGERY

## 2021-08-26 PROCEDURE — 63700000 HC SELF-ADMINISTRABLE DRUG: Performed by: NURSE PRACTITIONER

## 2021-08-26 PROCEDURE — 97535 SELF CARE MNGMENT TRAINING: CPT | Mod: GO

## 2021-08-26 PROCEDURE — 0SG30AJ FUSION OF LUMBOSACRAL JOINT WITH INTERBODY FUSION DEVICE, POSTERIOR APPROACH, ANTERIOR COLUMN, OPEN APPROACH: ICD-10-PCS | Performed by: ORTHOPAEDIC SURGERY

## 2021-08-26 PROCEDURE — 63600000 HC DRUGS/DETAIL CODE: Performed by: NURSE PRACTITIONER

## 2021-08-26 PROCEDURE — 00NY0ZZ RELEASE LUMBAR SPINAL CORD, OPEN APPROACH: ICD-10-PCS | Performed by: ORTHOPAEDIC SURGERY

## 2021-08-26 PROCEDURE — 97166 OT EVAL MOD COMPLEX 45 MIN: CPT | Mod: GO

## 2021-08-26 PROCEDURE — 25000000 HC PHARMACY GENERAL: Performed by: ANESTHESIOLOGY

## 2021-08-26 PROCEDURE — 63700000 HC SELF-ADMINISTRABLE DRUG

## 2021-08-26 PROCEDURE — 63600000 HC DRUGS/DETAIL CODE: Performed by: ORTHOPAEDIC SURGERY

## 2021-08-26 PROCEDURE — 36000014 HC OR LEVEL 4 EA ADDL MIN: Performed by: ORTHOPAEDIC SURGERY

## 2021-08-26 PROCEDURE — 25800000 HC PHARMACY IV SOLUTIONS: Performed by: ANESTHESIOLOGY

## 2021-08-26 PROCEDURE — 37000001 HC ANESTHESIA GENERAL: Performed by: ORTHOPAEDIC SURGERY

## 2021-08-26 PROCEDURE — 25000000 HC PHARMACY GENERAL: Performed by: ORTHOPAEDIC SURGERY

## 2021-08-26 PROCEDURE — 97162 PT EVAL MOD COMPLEX 30 MIN: CPT | Mod: GP

## 2021-08-26 PROCEDURE — 72100 X-RAY EXAM L-S SPINE 2/3 VWS: CPT

## 2021-08-26 PROCEDURE — 36000004 HC OR LEVEL 4 INITIAL 30MIN: Performed by: ORTHOPAEDIC SURGERY

## 2021-08-26 DEVICE — BODY TOP LOADING: Type: IMPLANTABLE DEVICE | Site: SPINE LUMBAR | Status: FUNCTIONAL

## 2021-08-26 DEVICE — FIBERS DBM 10CC CORT INTERGRO: Type: IMPLANTABLE DEVICE | Site: SPINE LUMBAR | Status: FUNCTIONAL

## 2021-08-26 DEVICE — IMPLANTABLE DEVICE: Type: IMPLANTABLE DEVICE | Site: SPINE LUMBAR | Status: FUNCTIONAL

## 2021-08-26 DEVICE — ROD 5.5 X 35MM: Type: IMPLANTABLE DEVICE | Site: SPINE LUMBAR | Status: FUNCTIONAL

## 2021-08-26 DEVICE — SET SCREW: Type: IMPLANTABLE DEVICE | Site: SPINE LUMBAR | Status: FUNCTIONAL

## 2021-08-26 DEVICE — SCREW 6.5 X 45MM NXG: Type: IMPLANTABLE DEVICE | Site: SPINE LUMBAR | Status: FUNCTIONAL

## 2021-08-26 RX ORDER — DEXAMETHASONE SODIUM PHOSPHATE 4 MG/ML
10 INJECTION, SOLUTION INTRA-ARTICULAR; INTRALESIONAL; INTRAMUSCULAR; INTRAVENOUS; SOFT TISSUE ONCE
Status: COMPLETED | OUTPATIENT
Start: 2021-08-27 | End: 2021-08-27

## 2021-08-26 RX ORDER — DEXTROSE 40 %
15-30 GEL (GRAM) ORAL AS NEEDED
Status: DISCONTINUED | OUTPATIENT
Start: 2021-08-26 | End: 2021-08-28 | Stop reason: HOSPADM

## 2021-08-26 RX ORDER — LABETALOL HCL 20 MG/4 ML
SYRINGE (ML) INTRAVENOUS AS NEEDED
Status: DISCONTINUED | OUTPATIENT
Start: 2021-08-26 | End: 2021-08-26 | Stop reason: SURG

## 2021-08-26 RX ORDER — PREGABALIN 75 MG/1
150 CAPSULE ORAL
Status: COMPLETED | OUTPATIENT
Start: 2021-08-26 | End: 2021-08-26

## 2021-08-26 RX ORDER — HEPARIN SODIUM 5000 [USP'U]/ML
5000 INJECTION, SOLUTION INTRAVENOUS; SUBCUTANEOUS EVERY 8 HOURS
Status: DISCONTINUED | OUTPATIENT
Start: 2021-08-27 | End: 2021-08-28 | Stop reason: HOSPADM

## 2021-08-26 RX ORDER — CYCLOBENZAPRINE HCL 10 MG
TABLET ORAL
Status: COMPLETED
Start: 2021-08-26 | End: 2021-08-26

## 2021-08-26 RX ORDER — POLYETHYLENE GLYCOL 3350 17 G/17G
17 POWDER, FOR SOLUTION ORAL DAILY
Status: DISCONTINUED | OUTPATIENT
Start: 2021-08-26 | End: 2021-08-28 | Stop reason: HOSPADM

## 2021-08-26 RX ORDER — PANTOPRAZOLE SODIUM 40 MG/1
40 TABLET, DELAYED RELEASE ORAL EVERY MORNING
Status: DISCONTINUED | OUTPATIENT
Start: 2021-08-26 | End: 2021-08-28 | Stop reason: HOSPADM

## 2021-08-26 RX ORDER — ASPIRIN 81 MG/1
81 TABLET ORAL DAILY
Status: DISCONTINUED | OUTPATIENT
Start: 2021-08-26 | End: 2021-08-28 | Stop reason: HOSPADM

## 2021-08-26 RX ORDER — ONDANSETRON HYDROCHLORIDE 2 MG/ML
4 INJECTION, SOLUTION INTRAVENOUS
Status: DISCONTINUED | OUTPATIENT
Start: 2021-08-26 | End: 2021-08-26 | Stop reason: HOSPADM

## 2021-08-26 RX ORDER — DIPHENHYDRAMINE HCL 25 MG
25 CAPSULE ORAL EVERY 6 HOURS PRN
Status: DISCONTINUED | OUTPATIENT
Start: 2021-08-26 | End: 2021-08-28 | Stop reason: HOSPADM

## 2021-08-26 RX ORDER — ROCURONIUM BROMIDE 10 MG/ML
INJECTION, SOLUTION INTRAVENOUS AS NEEDED
Status: DISCONTINUED | OUTPATIENT
Start: 2021-08-26 | End: 2021-08-26 | Stop reason: SURG

## 2021-08-26 RX ORDER — DEXTROSE 40 %
15-30 GEL (GRAM) ORAL AS NEEDED
Status: DISCONTINUED | OUTPATIENT
Start: 2021-08-26 | End: 2021-08-26 | Stop reason: HOSPADM

## 2021-08-26 RX ORDER — PHENYLEPHRINE HYDROCHLORIDE 10 MG/ML
INJECTION INTRAVENOUS AS NEEDED
Status: DISCONTINUED | OUTPATIENT
Start: 2021-08-26 | End: 2021-08-26 | Stop reason: SURG

## 2021-08-26 RX ORDER — ACETAMINOPHEN 325 MG/1
TABLET ORAL
Status: COMPLETED
Start: 2021-08-26 | End: 2021-08-26

## 2021-08-26 RX ORDER — HYDROMORPHONE HYDROCHLORIDE 1 MG/ML
0.5 INJECTION, SOLUTION INTRAMUSCULAR; INTRAVENOUS; SUBCUTANEOUS
Status: DISCONTINUED | OUTPATIENT
Start: 2021-08-26 | End: 2021-08-26 | Stop reason: HOSPADM

## 2021-08-26 RX ORDER — ACETAMINOPHEN 325 MG/1
650 TABLET ORAL EVERY 6 HOURS
Status: COMPLETED | OUTPATIENT
Start: 2021-08-26 | End: 2021-08-27

## 2021-08-26 RX ORDER — CYCLOBENZAPRINE HCL 10 MG
10 TABLET ORAL EVERY 8 HOURS PRN
Status: DISCONTINUED | OUTPATIENT
Start: 2021-08-26 | End: 2021-08-28 | Stop reason: HOSPADM

## 2021-08-26 RX ORDER — IBUPROFEN 200 MG
16-32 TABLET ORAL AS NEEDED
Status: DISCONTINUED | OUTPATIENT
Start: 2021-08-26 | End: 2021-08-26 | Stop reason: HOSPADM

## 2021-08-26 RX ORDER — ONDANSETRON HYDROCHLORIDE 2 MG/ML
INJECTION, SOLUTION INTRAVENOUS AS NEEDED
Status: DISCONTINUED | OUTPATIENT
Start: 2021-08-26 | End: 2021-08-26 | Stop reason: SURG

## 2021-08-26 RX ORDER — DEXAMETHASONE SODIUM PHOSPHATE 4 MG/ML
INJECTION, SOLUTION INTRA-ARTICULAR; INTRALESIONAL; INTRAMUSCULAR; INTRAVENOUS; SOFT TISSUE AS NEEDED
Status: DISCONTINUED | OUTPATIENT
Start: 2021-08-26 | End: 2021-08-26 | Stop reason: SURG

## 2021-08-26 RX ORDER — IBUPROFEN 200 MG
16-32 TABLET ORAL AS NEEDED
Status: CANCELLED | OUTPATIENT
Start: 2021-08-26

## 2021-08-26 RX ORDER — EPHEDRINE SULFATE 50 MG/ML
INJECTION, SOLUTION INTRAVENOUS AS NEEDED
Status: DISCONTINUED | OUTPATIENT
Start: 2021-08-26 | End: 2021-08-26 | Stop reason: SURG

## 2021-08-26 RX ORDER — BUPIVACAINE HCL/EPINEPHRINE 0.5-1:200K
VIAL (ML) INJECTION AS NEEDED
Status: DISCONTINUED | OUTPATIENT
Start: 2021-08-26 | End: 2021-08-26 | Stop reason: HOSPADM

## 2021-08-26 RX ORDER — MIRABEGRON 25 MG/1
25 TABLET, FILM COATED, EXTENDED RELEASE ORAL DAILY
COMMUNITY
End: 2022-01-21

## 2021-08-26 RX ORDER — DEXTROSE 50 % IN WATER (D50W) INTRAVENOUS SYRINGE
25 AS NEEDED
Status: CANCELLED | OUTPATIENT
Start: 2021-08-26

## 2021-08-26 RX ORDER — ACETAMINOPHEN 325 MG/1
975 TABLET ORAL
Status: COMPLETED | OUTPATIENT
Start: 2021-08-26 | End: 2021-08-26

## 2021-08-26 RX ORDER — FENTANYL CITRATE 50 UG/ML
INJECTION, SOLUTION INTRAMUSCULAR; INTRAVENOUS AS NEEDED
Status: DISCONTINUED | OUTPATIENT
Start: 2021-08-26 | End: 2021-08-26 | Stop reason: SURG

## 2021-08-26 RX ORDER — SODIUM CHLORIDE 9 MG/ML
INJECTION, SOLUTION INTRAVENOUS CONTINUOUS PRN
Status: DISCONTINUED | OUTPATIENT
Start: 2021-08-26 | End: 2021-08-26 | Stop reason: SURG

## 2021-08-26 RX ORDER — DEXTROSE 50 % IN WATER (D50W) INTRAVENOUS SYRINGE
25 AS NEEDED
Status: DISCONTINUED | OUTPATIENT
Start: 2021-08-26 | End: 2021-08-28 | Stop reason: HOSPADM

## 2021-08-26 RX ORDER — ONDANSETRON HYDROCHLORIDE 2 MG/ML
4 INJECTION, SOLUTION INTRAVENOUS EVERY 8 HOURS PRN
Status: DISCONTINUED | OUTPATIENT
Start: 2021-08-26 | End: 2021-08-28 | Stop reason: HOSPADM

## 2021-08-26 RX ORDER — PROPOFOL 10 MG/ML
INJECTION, EMULSION INTRAVENOUS AS NEEDED
Status: DISCONTINUED | OUTPATIENT
Start: 2021-08-26 | End: 2021-08-26 | Stop reason: SURG

## 2021-08-26 RX ORDER — LABETALOL HCL 20 MG/4 ML
5 SYRINGE (ML) INTRAVENOUS AS NEEDED
Status: DISCONTINUED | OUTPATIENT
Start: 2021-08-26 | End: 2021-08-26 | Stop reason: HOSPADM

## 2021-08-26 RX ORDER — ATORVASTATIN CALCIUM 20 MG/1
20 TABLET, FILM COATED ORAL EVERY EVENING
Status: DISCONTINUED | OUTPATIENT
Start: 2021-08-26 | End: 2021-08-28 | Stop reason: HOSPADM

## 2021-08-26 RX ORDER — OXYCODONE HYDROCHLORIDE 5 MG/1
5-10 TABLET ORAL EVERY 4 HOURS PRN
Status: DISCONTINUED | OUTPATIENT
Start: 2021-08-26 | End: 2021-08-28 | Stop reason: HOSPADM

## 2021-08-26 RX ORDER — ALUMINUM HYDROXIDE, MAGNESIUM HYDROXIDE, AND SIMETHICONE 1200; 120; 1200 MG/30ML; MG/30ML; MG/30ML
30 SUSPENSION ORAL EVERY 4 HOURS PRN
Status: DISCONTINUED | OUTPATIENT
Start: 2021-08-26 | End: 2021-08-28 | Stop reason: HOSPADM

## 2021-08-26 RX ORDER — SODIUM CHLORIDE 9 MG/ML
INJECTION, SOLUTION INTRAVENOUS CONTINUOUS
Status: ACTIVE | OUTPATIENT
Start: 2021-08-26 | End: 2021-08-27

## 2021-08-26 RX ORDER — LIDOCAINE HYDROCHLORIDE 10 MG/ML
INJECTION, SOLUTION INFILTRATION; PERINEURAL AS NEEDED
Status: DISCONTINUED | OUTPATIENT
Start: 2021-08-26 | End: 2021-08-26 | Stop reason: SURG

## 2021-08-26 RX ORDER — CYCLOBENZAPRINE HCL 10 MG
10 TABLET ORAL
Status: COMPLETED | OUTPATIENT
Start: 2021-08-26 | End: 2021-08-26

## 2021-08-26 RX ORDER — DEXTROSE 50 % IN WATER (D50W) INTRAVENOUS SYRINGE
25 AS NEEDED
Status: DISCONTINUED | OUTPATIENT
Start: 2021-08-26 | End: 2021-08-26 | Stop reason: HOSPADM

## 2021-08-26 RX ORDER — DEXTROSE 40 %
15-30 GEL (GRAM) ORAL AS NEEDED
Status: CANCELLED | OUTPATIENT
Start: 2021-08-26

## 2021-08-26 RX ORDER — BISACODYL 10 MG/1
10 SUPPOSITORY RECTAL DAILY PRN
Status: DISCONTINUED | OUTPATIENT
Start: 2021-08-26 | End: 2021-08-28 | Stop reason: HOSPADM

## 2021-08-26 RX ORDER — PREGABALIN 75 MG/1
CAPSULE ORAL
Status: COMPLETED
Start: 2021-08-26 | End: 2021-08-26

## 2021-08-26 RX ORDER — IBUPROFEN 200 MG
16-32 TABLET ORAL AS NEEDED
Status: DISCONTINUED | OUTPATIENT
Start: 2021-08-26 | End: 2021-08-28 | Stop reason: HOSPADM

## 2021-08-26 RX ORDER — AMOXICILLIN 250 MG
1 CAPSULE ORAL 2 TIMES DAILY
Status: DISCONTINUED | OUTPATIENT
Start: 2021-08-26 | End: 2021-08-28 | Stop reason: HOSPADM

## 2021-08-26 RX ORDER — PHENYLEPHRINE HCL IN 0.9% NACL 50MG/250ML
10-200 PLASTIC BAG, INJECTION (ML) INTRAVENOUS
Status: DISCONTINUED | OUTPATIENT
Start: 2021-08-26 | End: 2021-08-26

## 2021-08-26 RX ORDER — FENTANYL CITRATE 50 UG/ML
50 INJECTION, SOLUTION INTRAMUSCULAR; INTRAVENOUS
Status: DISCONTINUED | OUTPATIENT
Start: 2021-08-26 | End: 2021-08-26 | Stop reason: HOSPADM

## 2021-08-26 RX ADMIN — ACETAMINOPHEN 650 MG: 325 TABLET, FILM COATED ORAL at 14:14

## 2021-08-26 RX ADMIN — ACETAMINOPHEN 650 MG: 325 TABLET, FILM COATED ORAL at 17:19

## 2021-08-26 RX ADMIN — DEXAMETHASONE SODIUM PHOSPHATE 4 MG: 4 INJECTION, SOLUTION INTRA-ARTICULAR; INTRALESIONAL; INTRAMUSCULAR; INTRAVENOUS; SOFT TISSUE at 06:54

## 2021-08-26 RX ADMIN — PHENYLEPHRINE HYDROCHLORIDE 50 MCG/MIN: 10 INJECTION INTRAVENOUS at 06:51

## 2021-08-26 RX ADMIN — Medication 10 MG: at 05:54

## 2021-08-26 RX ADMIN — SODIUM CHLORIDE: 9 INJECTION, SOLUTION INTRAVENOUS at 14:15

## 2021-08-26 RX ADMIN — PHENYLEPHRINE HYDROCHLORIDE 50 MCG: 10 INJECTION INTRAVENOUS at 08:33

## 2021-08-26 RX ADMIN — PROPOFOL 150 MG: 10 INJECTION, EMULSION INTRAVENOUS at 06:33

## 2021-08-26 RX ADMIN — PHENYLEPHRINE HYDROCHLORIDE 50 MCG: 10 INJECTION INTRAVENOUS at 07:33

## 2021-08-26 RX ADMIN — PHENYLEPHRINE HYDROCHLORIDE 50 MCG: 10 INJECTION INTRAVENOUS at 08:47

## 2021-08-26 RX ADMIN — PHENYLEPHRINE HYDROCHLORIDE 50 MCG: 10 INJECTION INTRAVENOUS at 08:38

## 2021-08-26 RX ADMIN — ASPIRIN 81 MG: 81 TABLET, COATED ORAL at 14:14

## 2021-08-26 RX ADMIN — SUGAMMADEX 200 MG: 100 INJECTION, SOLUTION INTRAVENOUS at 09:20

## 2021-08-26 RX ADMIN — PREGABALIN 150 MG: 75 CAPSULE ORAL at 05:54

## 2021-08-26 RX ADMIN — PHENYLEPHRINE HYDROCHLORIDE 100 MCG: 10 INJECTION INTRAVENOUS at 09:13

## 2021-08-26 RX ADMIN — FENTANYL CITRATE 50 MCG: 50 INJECTION, SOLUTION INTRAMUSCULAR; INTRAVENOUS at 08:14

## 2021-08-26 RX ADMIN — PHENYLEPHRINE HYDROCHLORIDE 100 MCG: 10 INJECTION INTRAVENOUS at 08:56

## 2021-08-26 RX ADMIN — EPHEDRINE SULFATE 10 MG: 50 INJECTION INTRAVENOUS at 06:51

## 2021-08-26 RX ADMIN — PHENYLEPHRINE HYDROCHLORIDE 50 MCG: 10 INJECTION INTRAVENOUS at 08:35

## 2021-08-26 RX ADMIN — ROCURONIUM BROMIDE 50 MG: 10 INJECTION, SOLUTION INTRAVENOUS at 06:33

## 2021-08-26 RX ADMIN — FENTANYL CITRATE 50 MCG: 50 INJECTION, SOLUTION INTRAMUSCULAR; INTRAVENOUS at 10:37

## 2021-08-26 RX ADMIN — ATORVASTATIN CALCIUM 20 MG: 20 TABLET, FILM COATED ORAL at 17:18

## 2021-08-26 RX ADMIN — LIDOCAINE HYDROCHLORIDE 5 ML: 10 INJECTION, SOLUTION INFILTRATION; PERINEURAL at 06:33

## 2021-08-26 RX ADMIN — ACETAMINOPHEN 975 MG: 325 TABLET ORAL at 05:54

## 2021-08-26 RX ADMIN — LABETALOL HYDROCHLORIDE 10 MG: 5 INJECTION, SOLUTION INTRAVENOUS at 09:48

## 2021-08-26 RX ADMIN — ONDANSETRON 4 MG: 2 INJECTION INTRAMUSCULAR; INTRAVENOUS at 09:20

## 2021-08-26 RX ADMIN — POLYETHYLENE GLYCOL 3350 17 G: 17 POWDER, FOR SOLUTION ORAL at 14:14

## 2021-08-26 RX ADMIN — ACETAMINOPHEN 975 MG: 325 TABLET, FILM COATED ORAL at 05:54

## 2021-08-26 RX ADMIN — FENTANYL CITRATE 50 MCG: 50 INJECTION, SOLUTION INTRAMUSCULAR; INTRAVENOUS at 06:33

## 2021-08-26 RX ADMIN — CEFAZOLIN 2 G: 330 INJECTION, POWDER, FOR SOLUTION INTRAMUSCULAR; INTRAVENOUS at 22:38

## 2021-08-26 RX ADMIN — CEFAZOLIN 2 G: 330 INJECTION, POWDER, FOR SOLUTION INTRAMUSCULAR; INTRAVENOUS at 15:18

## 2021-08-26 RX ADMIN — PHENYLEPHRINE HYDROCHLORIDE 50 MCG: 10 INJECTION INTRAVENOUS at 08:21

## 2021-08-26 RX ADMIN — CYCLOBENZAPRINE HYDROCHLORIDE 10 MG: 10 TABLET, FILM COATED ORAL at 05:54

## 2021-08-26 RX ADMIN — CYCLOBENZAPRINE HYDROCHLORIDE 10 MG: 10 TABLET, FILM COATED ORAL at 19:53

## 2021-08-26 RX ADMIN — DOCUSATE SODIUM AND SENNOSIDES 1 TABLET: 8.6; 5 TABLET, FILM COATED ORAL at 19:51

## 2021-08-26 RX ADMIN — PANTOPRAZOLE SODIUM 40 MG: 40 TABLET, DELAYED RELEASE ORAL at 14:14

## 2021-08-26 RX ADMIN — SODIUM CHLORIDE: 0.9 INJECTION, SOLUTION INTRAVENOUS at 06:30

## 2021-08-26 RX ADMIN — ROCURONIUM BROMIDE 20 MG: 10 INJECTION, SOLUTION INTRAVENOUS at 07:20

## 2021-08-26 RX ADMIN — SODIUM CHLORIDE 2 G: 9 INJECTION, SOLUTION INTRAVENOUS at 06:40

## 2021-08-26 RX ADMIN — FENTANYL CITRATE 50 MCG: 50 INJECTION, SOLUTION INTRAMUSCULAR; INTRAVENOUS at 07:14

## 2021-08-26 ASSESSMENT — COGNITIVE AND FUNCTIONAL STATUS - GENERAL
EATING MEALS: 4 - NONE
AFFECT: WFL
DRESSING REGULAR LOWER BODY CLOTHING: 3 - A LITTLE
CLIMB 3 TO 5 STEPS WITH RAILING: 2 - A LOT
STANDING UP FROM CHAIR USING ARMS: 3 - A LITTLE
HELP NEEDED FOR BATHING: 3 - A LITTLE
TOILETING: 3 - A LITTLE
WALKING IN HOSPITAL ROOM: 3 - A LITTLE
HELP NEEDED FOR PERSONAL GROOMING: 3 - A LITTLE
AFFECT: WFL
DRESSING REGULAR UPPER BODY CLOTHING: 3 - A LITTLE
MOVING TO AND FROM BED TO CHAIR: 3 - A LITTLE

## 2021-08-26 ASSESSMENT — PATIENT HEALTH QUESTIONNAIRE - PHQ9: SUM OF ALL RESPONSES TO PHQ9 QUESTIONS 1 & 2: 3
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
## 2021-08-26 NOTE — ANESTHESIA PREPROCEDURE EVALUATION
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  Resting EKG demonstrates normal sinus rhythm and is normal.  2.  The patient demonstrates good exercise tolerance exercising to the 4 stages of a Marty protocol.  No chest pain is reported.  Blood pressure response and heart rate response are normal.  3.  EKG response to exercise is normal.  4.  Resting echo demonstrates normal left ventricular size and wall thickness with preserved systolic function and no wall motion abnormalities.  5.  Peak exercise, all segments of the left ventricle improve the function, left ventricular cavity becomes smaller and the ejection fraction increases.  6.  Conclusions: No evidence of exercise-induced ischemia by clinical, ECG or echo criteria at the level of exercise attained.     Manjeet Salter MD       CBC Results       08/17/21                          1209           WBC 6.19           RBC 4.51           HGB 15.7           HCT 46.2           .4           MCH 34.8           MCHC 34.0                                    BMP Results       08/17/21                          1209                      K 4.1           Cl 106           CO2 24           Glucose 143           BUN 11           Creatinine 1.2           Calcium 9.3           Anion Gap 8           EGFR 58.6                     "       Anesthesia ROS/MED HX    Anesthesia History    Previous anesthetics  Neuro/Psych    CVA (no motor weakness, no residual )  Cardiovascular   Cardiac clearance reviewed, Covid19 Test Reviewed and ECG reviewed  Abnormal ECG      Hematological    anticoagulants (stopped five days ago)  GI/Hepatic   GERD       Past Surgical History:   Procedure Laterality Date   • COLECTOMY  1999    for diverticulitis   • CORONARY ARTERY BYPASS GRAFT  2008    2 v   • CORONARY ARTERY BYPASS GRAFT  2008   • CYSTOSCOPY W/ URETERAL STENT PLACEMENT  2012   • ESOPHAGEAL DILATION      EGD w/ dilation x 2   • INGUINAL HERNIA REPAIR     • KIDNEY STONE SURGERY  01/13/2020   • SKIN LESION EXCISION      several   • TONSILLECTOMY         Physical Exam    Airway   Mallampati: II   TM distance: >3 FB   Neck ROM: full  Cardiovascular - normal   Rhythm: regular   Rate: normalPulmonary - normal   clear to auscultation  Dental - normal        Anesthesia Plan    Plan: general     Lines and Monitors: PIV     Airway: oral intubation     instructed to abstain from smoking on day of procedure    Patient did not smoke on day of surgery  ASA 3  Blood Products:   Use of Blood Products Discussed: No   Anesthetic plan and risks discussed with: patient  Induction:    intravenous   Postop Plan:   Patient Disposition: inpatient floor planned admission

## 2021-08-26 NOTE — ANESTHESIA POSTPROCEDURE EVALUATION
Patient: Ko Shearer    Procedure Summary     Date: 08/26/21 Room / Location: [facility] PAV OR 03 / [facility] OR PAV    Anesthesia Start: 0630 Anesthesia Stop: 0959    Procedure: Posterior L5-S1 Decompression, Instrumented Fusion, Autograft, Allograft (N/A Spine Lumbar) Diagnosis:       Spinal stenosis, lumbar region, with neurogenic claudication      (Spinal stenosis, lumbar region, with neurogenic claudication [M48.062])    Surgeons: Carlo Rodas MD Responsible Provider: Johana Waddell DO    Anesthesia Type: general ASA Status: 3          Anesthesia Type: general  PACU Vitals  8/26/2021 0950 - 8/26/2021 0959      8/26/2021  0953             BP:  (!) 155/83    Temp:  36.6 °C (97.8 °F)    Pulse:  86    SpO2:  96 %            Anesthesia Post Evaluation    Pain management: adequate  Patient location during evaluation: PACU  Patient participation: complete - patient participated  Level of consciousness: awake and alert  Cardiovascular status: acceptable  Airway Patency: adequate  Respiratory status: acceptable  Hydration status: acceptable  Anesthetic complications: no

## 2021-08-26 NOTE — HOSPITAL COURSE
Ko is a 80 y.o. male admitted on 8/26/2021 with Spinal stenosis, lumbar region, with neurogenic claudication [M48.062]  S/P lumbar fusion [Z98.1]. Principal problem is No Principal Problem: There is no principal problem currently on the Problem List. Please update the Problem List and refresh..    Past Medical History  Ko has a past medical history of Arthritis, Asthma, BPH (benign prostatic hyperplasia), Coronary artery disease, Diverticulitis of colon, Esophageal abnormality, Esophageal stricture, Frequent UTI, GERD (gastroesophageal reflux disease), H/O migraine, Kidney stones (01/2020), Lipid disorder, Lumbar stenosis, Skin cancer, Skin cancer, SOB (shortness of breath), Stroke (CMS/[facility]) (2014), and Thoracic spine fracture (CMS/[facility]).    History of Present Illness    80 year old male s/p L5-S1 PLDF by Dr Rodas on 8/26/21  Spinal precautions

## 2021-08-26 NOTE — PLAN OF CARE
Problem: Adult Inpatient Plan of Care  Goal: Plan of Care Review  Outcome: Progressing  Flowsheets (Taken 8/26/2021 8827)  Progress: improving  Plan of Care Reviewed With: patient  Outcome Summary: PT eval complete

## 2021-08-26 NOTE — ASSESSMENT & PLAN NOTE
On aspirin, Plavix and statin  Resume antiplatelets as soon as possible when okay from surgery standpoint.

## 2021-08-26 NOTE — OR SURGEON
Pre-Procedure patient identification:  I am the primary operating surgeon/proceduralist and I have identified the patient on 08/26/21 at 6:21 AM Carlo Rodas MD  Phone Number: [phone]

## 2021-08-26 NOTE — ASSESSMENT & PLAN NOTE
POD 1  Wound care, PT/OT per ortho recs  Prn pain control, bowel regimen  DVT proph  Encouraged incentive spirometry  Follow post op labs

## 2021-08-26 NOTE — OP NOTE
Attending Surgeon: Carlo Rodas MD    Assistant: Ortho Resident    Preoperative diagnosis: L5-S1 Foraminal Stenosis, Scoliosis    Postoperative Diagnosis: Same    Procedure: L5-S1 Decompression, Instrumented Interbody Fusion and Posterior Fusion    Anesthesia: GET    EBL: 200 cc    Instrumentation: Orthofix    Status: Stable to PACU    INDICATIONS:  Ko Shearer is a pleasant 80 y.o. male who presented to my office complaining of back and leg pain, numbness and tingling. Symptoms were progressive and were significantly affecting the patient's activity level and quality of life. Symptoms were recalcitrant to nonsurgical management. I examined the patient and reviewed the imaging studies. Imaging was notable for L5-S1 Foraminal Stenosis. I discussed my findings with the patient. We discussed both surgical and nonsurgical treatment options. The patient was aware that nonsurgical options were available but did risk persistence and potential progression of symptoms. Surgical intervention was also discussed. This would involve a L5-S1 TLIF. The procedure was discussed in detail. The risks were also discussed. The patient demonstrated good understanding of the risks, benefits and alternatives and elected undergo surgery and signed the appropriate consent forms.    TECHNIQUE:  The patient was identified in the preoperative holding area. The surgical site was marked in the procedure was confirmed. The patient was taken to the operating room. Bilateral SCDs were applied. General anesthesia was induced and endotracheal intubation was performed. The patient was placed prone on the operating table. Arms were placed on arm boards and all bony prominences were well padded. The patient was prepped and draped in the usual sterile fashion. A surgical timeout was performed. Perioperative antibiotics were administered.    The paraspinal muscles were infiltrated with 30 mL of half percent Marcaine with epinephrine. A midline lumbar  incision was made. A standard approach to the lumbar spine was performed. A localizing film was taken to confirm the level. After confirming the level, the exposure was completed. An L5-S1 facetectomy was performed. The pedicle was skeletonized. The nerve root and dural sac were identified and gently retracted medially and protected with a nerve root retractor. A complete discectomy of performed. The endplates were denuded of cartilage. The disc space was irrigated. The disc space was packed local autograft and allograft. An interbody cage was packed with local autograft and inserted into the disc space. The L5 lamina was excised. The decompression was taken lateral out to the medial wall of the L5 & S1 pedicles. The bilateral L5-S1 foramen and lateral recess were decompressed. Upon completion of the decompression there was no further central stenosis. The bilateral L5 & S1 nerves exited the spine without compression.     Pedicle screws were placed bilaterally at L5 & S1. The pedicles were palpated to insure there was no breach. Rods were secured to the pedicle screws with set screws which were final tightened.     Final intra-operative 3D Fluoroscopic Imaging was performed to confirm appropriate positioning.     The wound was copiously irrigated. Hemostasis was achieved. The posterolateral elements were decorticated and autograft and allograft was placed over the decorticated surfaces.   The wound were closed in multiple layers and sterile dressings were placed.    General anesthasia was discontinued and extubation was performed. The patient woke up grossly moving the lower extremities bilaterally. The patient was then taken to the recovery room in stable condition.    At the completion the case needle and sponge accounts were correct x2.    I was present and participated in all key aspects of the surgical procedure.

## 2021-08-26 NOTE — CONSULTS
Hospital Medicine Service -  Daily Progress Note       SUBJECTIVE   Interval History:   Patient seen in the PACU. He notes that he is doing ok, all things considered. He reports back pain and L hip pain, notes that he had L hip pain pre-op.   He denies HA, dizziness, CP, dyspnea, abdominal pain, N/V.      OBJECTIVE      Vital signs in last 24 hours:  Temp:  [36.3 °C (97.4 °F)-36.7 °C (98 °F)] 36.7 °C (98 °F)  Heart Rate:  [59-86] 70  Resp:  [4-27] 16  BP: ()/(52-83) 111/57    Intake/Output Summary (Last 24 hours) at 8/26/2021 1405  Last data filed at 8/26/2021 1318  Gross per 24 hour   Intake 1050 ml   Output 800 ml   Net 250 ml       PHYSICAL EXAMINATION      Physical Exam  Vitals and nursing note reviewed.   Constitutional:       General: He is not in acute distress.     Appearance: Normal appearance. He is not toxic-appearing.   HENT:      Head: Normocephalic and atraumatic.   Eyes:      Conjunctiva/sclera: Conjunctivae normal.   Cardiovascular:      Rate and Rhythm: Normal rate and regular rhythm.      Heart sounds: Normal heart sounds.   Pulmonary:      Effort: Pulmonary effort is normal. No respiratory distress.      Breath sounds: Normal breath sounds.   Abdominal:      General: Bowel sounds are normal. There is no distension.      Palpations: Abdomen is soft.      Tenderness: There is no abdominal tenderness.   Genitourinary:     Comments: + Patrick Catheter present  Musculoskeletal:         General: No swelling.      Cervical back: Neck supple.   Skin:     General: Skin is warm and dry.   Neurological:      General: No focal deficit present.      Mental Status: He is oriented to person, place, and time.   Psychiatric:         Mood and Affect: Mood normal.         Behavior: Behavior normal.            LINES, CATHETERS, DRAINS, AIRWAYS, AND WOUNDS   Lines, Drains, and Airways:  Wounds (agree with documentation and present on admission):  Peripheral IV (Adult) 08/26/21 Anterior;Distal;Left Forearm  (Active)   Number of days: 0       Drain 1 Back (Active)   Number of days: 0       Urethral Catheter Latex;Double-lumen 16 Fr (Active)   Number of days: 0       Surgical Incision Back (Active)   Number of days: 0         Comments:      LABS / IMAGING / TELE      Labs  Outpatient labs from 8/17/21 personally reviewed: Cr 1.2, Hgb 15.7, K 4.1.     SARS-CoV-2 (COVID-19) (no units)   Date/Time Value   08/23/2021 1113 Negative     Imaging  I have independently reviewed the pertinent imaging from the last 24 hrs.    ECG/Telemetry  NSR on PACU monitor    ASSESSMENT AND PLAN      Status post lumbar spine surgery for decompression of spinal cord  Assessment & Plan  POD 0  Wound care, PT/OT per ortho recs  Prn pain control, bowel regimen  DVT proph  Encouraged incentive spirometry  Follow post op labs    Hyperglycemia  Assessment & Plan  Noted on preop labs. This was nonfasting blood work.  No history of diabetes.  Recommended follow-up with primary care doctor for further evaluation if no recent diabetic screening.   Monitor post op    BPH (benign prostatic hyperplasia)  Assessment & Plan  At increased risk for urinary retention post op. Monitor urine output closely post op for any signs of retention  Recommend early ambulation and aggressive bowel regimen on narcotics post op.    History of CVA (cerebrovascular accident)  Assessment & Plan  On aspirin, Plavix and statin  Resume antiplatelets as soon as possible when okay from surgery standpoint.    S/P CABG (coronary artery bypass graft)  Assessment & Plan  Hx of CABG  On aspirin, plavix and statin  Per cardiology recs: can hold plavix 5 days preop and cont baby aspirin through surgery  Hold fish oil for now  Resume antiplatelets as soon as possible when okay from surgery standpoint.         VICKY Ruiz  8/26/2021

## 2021-08-26 NOTE — ANESTHESIOLOGIST PRE-PROCEDURE ATTESTATION
Pre-Procedure Patient Identification:  I am the Primary Anesthesiologist and have identified the patient on 08/26/21 at 6:00 AM.   I have confirmed the procedure(s) will be performed by the following surgeon/proceduralist Carlo Rodas MD.

## 2021-08-26 NOTE — PLAN OF CARE
Problem: Functional Ability Impaired (Spinal Surgery)  Goal: Optimal Functional Ability  Outcome: Progressing     Problem: Adult Inpatient Plan of Care  Goal: Plan of Care Review  Outcome: Progressing  Flowsheets (Taken 8/26/2021 6107)  Progress: improving  Plan of Care Reviewed With:   patient   family  Outcome Summary: Patient needs assist w/ head elevated bed mobility , LE adl's, STS plus RW mobility activities w/ incr strength /endurance

## 2021-08-26 NOTE — PROGRESS NOTES
Patient:  Ko Shearer  Location:  [facility] 5PAV 5412  MRN:  [id number]  Today's date:  8/26/2021   Patient sitting in recliner w/ seat  Alarm activated & call bell nearby     Ko is a 80 y.o. male admitted on 8/26/2021 with Spinal stenosis, lumbar region, with neurogenic claudication [M48.062]  S/P lumbar fusion [Z98.1]. Principal problem is No Principal Problem: There is no principal problem currently on the Problem List. Please update the Problem List and refresh..    Past Medical History  Ko has a past medical history of Arthritis, Asthma, BPH (benign prostatic hyperplasia), Coronary artery disease, Diverticulitis of colon, Esophageal abnormality, Esophageal stricture, Frequent UTI, GERD (gastroesophageal reflux disease), H/O migraine, Kidney stones (01/2020), Lipid disorder, Lumbar stenosis, Skin cancer, Skin cancer, SOB (shortness of breath), Stroke (CMS/[facility]) (2014), and Thoracic spine fracture (CMS/[facility]).    History of Present Illness   Dx:  L5-S1 Foraminal Stenosis, Scoliosis   8/26/21 Procedure: L5-S1 Decompression, Instrumented Interbody Fusion and Posterior Fusion by Dr Rodas      OT Vitals    Date/Time Pulse SpO2 Pt Activity O2 Therapy BP [facility]   08/26/21 1624 96 95 % At rest None (Room air) 106/56 CP   08/26/21 1640 91 96 % Walking None (Room air) 95/54 SGB   08/26/21 1642 91 96 % At rest None (Room air) 95/54 CP      OT Pain    Date/Time Pain Type Location Rating: Rest Rating: Activity Interventions [facility]   08/26/21 1624 Pain Assessment back 5 7 position adjusted CP          Prior Living Environment      Most Recent Value   People in Home  spouse   Current Living Arrangements  home/apartment/condo   Living Environment Comment  Lives w/ spouse in 2SH w/ first floor GA . B & tub shower on 2nd floor         Prior Level of Function      Most Recent Value   Ambulation  independent   Transferring  independent   Toileting  independent   Bathing  independent   Dressing  independent   Eating   independent   Communication  understands/communicates without difficulty   Prior Level of Function Comment  PTA: IND w/ adl's / mobility w/o device    Assistive Device Currently Used at Home  none        Occupational Profile      Most Recent Value   Reason for Services/Referral  ADL /ambulation dysfx   Patient Goals  to get stronger           OT Evaluation and Treatment - 08/26/21 1624        OT Time Calculation    Start Time  1624     Stop Time  1648     Time Calculation (min)  24 min        Session Details    Document Type  initial evaluation     Mode of Treatment  occupational therapy        General Information    General Observations of Patient  Patient received supine in head elevated bed      Existing Precautions/Restrictions  fall;spinal        Living Environment    Primary Care Provided by  self        Cognition/Psychosocial    Affect/Mental Status (Cognition)  WFL     Orientation Status (Cognition)  oriented x 4     Follows Commands (Cognition)  WFL     Cognitive Function  WFL        Hearing Assessment    Hearing Status  WFL        Vision Assessment/Intervention    Visual Impairment/Limitations  corrective lenses for reading        Range of Motion (ROM)    Range of Motion  ROM is WFL;bilateral upper extremities        Strength (Manual Muscle Testing)    Strength (Manual Muscle Testing)  strength is WFL;bilateral upper extremities        Bed Mobility    [place], Supine to Sit  minimum assist (75% or more patient effort)     Assistive Device  bed rails;head of bed elevated        Transfers    Maintains Weight-Bearing Status (Transfers)  able to maintain     Comment  MIN w/ RW        Bed to Chair Transfer    [place], Bed to Chair  minimum assist (75% or more patient effort)     Verbal Cues  safety;technique     Assistive Device  walker, front-wheeled        Sit to Stand Transfer    [place], Sit to Stand Transfer  minimum assist (75% or more patient effort)     Assistive Device  walker,  front-wheeled        Stand to Sit Transfer    [place], Stand to Sit Transfer  minimum assist (75% or more patient effort)     Assistive Device  walker, front-wheeled        Balance    Static Sitting Balance  WFL     Dynamic Sitting Balance  WFL     Static Standing Balance  mild impairment;supported     Dynamic Standing Balance  mild impairment;supported        Lower Body Dressing    Self-Performance  dons/doffs left sock;dons/doffs right sock     [place]  minimum assist (75% or more patient effort)     Position  supported sitting        AM-PAC (TM) - ADL (Current Function)    Putting on and taking off regular lower body clothing?  3 - A Little     Bathing?  3 - A Little     Toileting?  3 - A Little     Putting on/taking off regular upper body clothing?  3 - A Little     How much help for taking care of personal grooming?  3 - A Little     Eating meals?  4 - None     AM-PAC (TM) ADL Score  19                       Education Documentation  Relaxation, taught by Anjali Forte OT at 8/26/2021  6:20 PM.  Learner: Patient  Readiness: Eager  Method: Explanation  Response: Verbalizes Understanding  Comment: Instructed in econ techniques to maximize IND & endurance w/ adl's, bed mobility plus  RW mobility activities    Proper Positioning, taught by Anjali Forte OT at 8/26/2021  6:20 PM.  Learner: Patient  Readiness: Eager  Method: Explanation  Response: Verbalizes Understanding  Comment: Instructed in econ techniques to maximize IND & endurance w/ adl's, bed mobility plus  RW mobility activities    Prioritize Tasks, taught by Anjali Forte OT at 8/26/2021  6:20 PM.  Learner: Patient  Readiness: Eager  Method: Explanation  Response: Verbalizes Understanding  Comment: Instructed in econ techniques to maximize IND & endurance w/ adl's, bed mobility plus  RW mobility activities    Pace Activity, taught by Anjali Forte OT at 8/26/2021  6:20 PM.  Learner: Patient  Readiness: Eager  Method: Explanation  Response:  Verbalizes Understanding  Comment: Instructed in econ techniques to maximize IND & endurance w/ adl's, bed mobility plus  RW mobility activities    Breathing, taught by Anjali Forte, OT at 8/26/2021  6:20 PM.  Learner: Patient  Readiness: Eager  Method: Explanation  Response: Verbalizes Understanding  Comment: Instructed in econ techniques to maximize IND & endurance w/ adl's, bed mobility plus  RW mobility activities    Purpose, taught by Anjali Forte, OT at 8/26/2021  6:20 PM.  Learner: Patient  Readiness: Eager  Method: Explanation  Response: Verbalizes Understanding  Comment: Instructed in econ techniques to maximize IND & endurance w/ adl's, bed mobility plus  RW mobility activities          OT Assessment/Plan      Most Recent Value   Daily Outcome Statement  [facility]  19 : Patient neeeds assist w/ head elevated bed mobility , lE adl's, STS plus Rw mobility activites 2* decr strength/endurance  at 08/26/2021 1624   OT Recommended Discharge Disposition  family member's home at 08/26/2021 1624   Rehab Potential  good, to achieve stated therapy goals at 08/26/2021 1624   Therapy Frequency  3-5 times/wk at 08/26/2021 1624   Planned Therapy Interventions  activity tolerance training, BADL retraining, functional balance retraining, occupation/activity based interventions at 08/26/2021 1624   Anticipated Equipment Needs At Discharge (OT)  bathing equipment, dressing equipment, walker, front-wheeled at 08/26/2021 1624   Patient/Family Therapy Goal Statement  to get dtronger  at 08/26/2021 1624          OT Goals      Most Recent Value   Bed Mobility Goal 1   Activity/Assistive Device  sit to supine, supine to sit at 08/26/2021 1624   [place]  supervision required at 08/26/2021 1624   Time Frame  by discharge at 08/26/2021 1624   Progress/Outcome  goal ongoing at 08/26/2021 1624   Transfer Goal 1   Activity/Assistive Device  sit-to-stand/stand-to-sit, bed-to-chair/chair-to-bed, walker, front-wheeled at 08/26/2021  1624   [place]  supervision required at 08/26/2021 1624   Time Frame  by discharge at 08/26/2021 1624   Progress/Outcome  goal ongoing at 08/26/2021 1624   Dressing Goal 1   Activity/Adaptive Equipment  lower body dressing at 08/26/2021 1624   [place]  supervision required at 08/26/2021 1624   Time Frame  by discharge at 08/26/2021 1624   Progress/Outcome  goal ongoing at 08/26/2021 1624

## 2021-08-26 NOTE — ANESTHESIA PROCEDURE NOTES
Airway  Urgency: elective    Start Time: 8/26/2021 6:34 AM    General Information and Staff    Patient location during procedure: OR  Anesthesiologist: Johana Waddell DO  Performed: anesthesiologist     Indications and Patient Condition  Indications for airway management: anesthesia  Sedation level: general  Preoxygenated: yes  Patient position: sniffing  MILS maintained throughout  Mask difficulty assessment: 1 - vent by mask    Final Airway Details  Final airway type: endotracheal airway      Successful airway: ETT  Cuffed: yes   Successful intubation technique: direct laryngoscopy  Endotracheal tube insertion site: oral  Blade: Dottie  Blade size: #3  ETT size (mm): 7.0  Cormack-Lehane Classification: grade I - full view of glottis  Placement verified by: chest auscultation, capnometry and palpation of cuff   Measured from: lips  ETT to lips (cm): 22  Number of attempts at approach: 1  Number of other approaches attempted: 0  Atraumatic airway insertion      Additional Comments  One attempt, no trauma

## 2021-08-26 NOTE — NURSING NOTE
Upon reassessment patient noted new tingling to LLE. RN made ortho aware. No new orders at this time.

## 2021-08-26 NOTE — ASSESSMENT & PLAN NOTE
Hx of CABG  On aspirin, plavix and statin  Per cardiology recs: can hold plavix 5 days preop and cont baby aspirin through surgery  Hold fish oil for now  Resume antiplatelets as soon as possible when okay from surgery standpoint.

## 2021-08-26 NOTE — ASSESSMENT & PLAN NOTE
Noted on preop labs. This was nonfasting blood work.  No history of diabetes.  Recommended follow-up with primary care doctor for further evaluation if no recent diabetic screening.   Monitor post op

## 2021-08-27 LAB
ANION GAP SERPL CALC-SCNC: 8 MEQ/L (ref 3–15)
BUN SERPL-MCNC: 15 MG/DL (ref 8–20)
CALCIUM SERPL-MCNC: 8.1 MG/DL (ref 8.9–10.3)
CHLORIDE SERPL-SCNC: 106 MEQ/L (ref 98–109)
CO2 SERPL-SCNC: 22 MEQ/L (ref 22–32)
CREAT SERPL-MCNC: 1.2 MG/DL (ref 0.8–1.3)
ERYTHROCYTE [DISTWIDTH] IN BLOOD BY AUTOMATED COUNT: 13.5 % (ref 11.6–14.4)
GFR SERPL CREATININE-BSD FRML MDRD: 58.6 ML/MIN/1.73M*2
GLUCOSE SERPL-MCNC: 120 MG/DL (ref 70–99)
HCT VFR BLDCO AUTO: 37.1 % (ref 40.1–51)
HGB BLD-MCNC: 12.1 G/DL (ref 13.7–17.5)
MCH RBC QN AUTO: 34.9 PG (ref 28–33.2)
MCHC RBC AUTO-ENTMCNC: 32.6 G/DL (ref 32.2–36.5)
MCV RBC AUTO: 106.9 FL (ref 83–98)
PDW BLD AUTO: 9.6 FL (ref 9.4–12.4)
PLATELET # BLD AUTO: 158 K/UL (ref 150–350)
POTASSIUM SERPL-SCNC: 3.7 MEQ/L (ref 3.6–5.1)
RBC # BLD AUTO: 3.47 M/UL (ref 4.5–5.8)
SODIUM SERPL-SCNC: 136 MEQ/L (ref 136–144)
WBC # BLD AUTO: 13.13 K/UL (ref 3.8–10.5)

## 2021-08-27 PROCEDURE — 63700000 HC SELF-ADMINISTRABLE DRUG: Performed by: NURSE PRACTITIONER

## 2021-08-27 PROCEDURE — 25800000 HC PHARMACY IV SOLUTIONS: Performed by: NURSE PRACTITIONER

## 2021-08-27 PROCEDURE — 97535 SELF CARE MNGMENT TRAINING: CPT | Mod: GO,59

## 2021-08-27 PROCEDURE — 99225 PR SBSQ OBSERVATION CARE/DAY 25 MINUTES: CPT | Performed by: HOSPITALIST

## 2021-08-27 PROCEDURE — 97116 GAIT TRAINING THERAPY: CPT | Mod: GP

## 2021-08-27 PROCEDURE — 80048 BASIC METABOLIC PNL TOTAL CA: CPT | Performed by: NURSE PRACTITIONER

## 2021-08-27 PROCEDURE — 97166 OT EVAL MOD COMPLEX 45 MIN: CPT | Mod: GO,59

## 2021-08-27 PROCEDURE — 36415 COLL VENOUS BLD VENIPUNCTURE: CPT | Performed by: NURSE PRACTITIONER

## 2021-08-27 PROCEDURE — 97530 THERAPEUTIC ACTIVITIES: CPT | Mod: GP

## 2021-08-27 PROCEDURE — 85027 COMPLETE CBC AUTOMATED: CPT | Performed by: NURSE PRACTITIONER

## 2021-08-27 PROCEDURE — 63600000 HC DRUGS/DETAIL CODE: Mod: JW | Performed by: NURSE PRACTITIONER

## 2021-08-27 RX ORDER — DOCUSATE SODIUM 100 MG/1
100 CAPSULE, LIQUID FILLED ORAL 2 TIMES DAILY PRN
Start: 2021-08-27 | End: 2022-01-21

## 2021-08-27 RX ORDER — HYDROCODONE BITARTRATE AND ACETAMINOPHEN 5; 325 MG/1; MG/1
1-2 TABLET ORAL EVERY 4 HOURS PRN
Start: 2021-08-27 | End: 2021-09-01

## 2021-08-27 RX ORDER — CLOPIDOGREL BISULFATE 75 MG/1
75 TABLET ORAL DAILY
Qty: 30 TABLET | Refills: 0
Start: 2021-08-29 | End: 2021-09-13

## 2021-08-27 RX ORDER — CYCLOBENZAPRINE HCL 10 MG
10 TABLET ORAL 3 TIMES DAILY PRN
Qty: 30 TABLET | Refills: 0
Start: 2021-08-27 | End: 2022-01-21

## 2021-08-27 RX ADMIN — OXYCODONE HYDROCHLORIDE 5 MG: 5 TABLET ORAL at 10:31

## 2021-08-27 RX ADMIN — ACETAMINOPHEN 650 MG: 325 TABLET, FILM COATED ORAL at 01:09

## 2021-08-27 RX ADMIN — ACETAMINOPHEN 650 MG: 325 TABLET, FILM COATED ORAL at 18:36

## 2021-08-27 RX ADMIN — DOCUSATE SODIUM AND SENNOSIDES 1 TABLET: 8.6; 5 TABLET, FILM COATED ORAL at 08:35

## 2021-08-27 RX ADMIN — OXYCODONE HYDROCHLORIDE 5 MG: 5 TABLET ORAL at 05:24

## 2021-08-27 RX ADMIN — SODIUM CHLORIDE: 9 INJECTION, SOLUTION INTRAVENOUS at 04:15

## 2021-08-27 RX ADMIN — ACETAMINOPHEN 650 MG: 325 TABLET, FILM COATED ORAL at 05:21

## 2021-08-27 RX ADMIN — HEPARIN SODIUM 5000 UNITS: 5000 INJECTION, SOLUTION INTRAVENOUS; SUBCUTANEOUS at 05:21

## 2021-08-27 RX ADMIN — DOCUSATE SODIUM AND SENNOSIDES 1 TABLET: 8.6; 5 TABLET, FILM COATED ORAL at 21:17

## 2021-08-27 RX ADMIN — ACETAMINOPHEN 650 MG: 325 TABLET, FILM COATED ORAL at 23:37

## 2021-08-27 RX ADMIN — DEXAMETHASONE SODIUM PHOSPHATE 10 MG: 4 INJECTION, SOLUTION INTRA-ARTICULAR; INTRALESIONAL; INTRAMUSCULAR; INTRAVENOUS; SOFT TISSUE at 05:21

## 2021-08-27 RX ADMIN — ACETAMINOPHEN 650 MG: 325 TABLET, FILM COATED ORAL at 12:24

## 2021-08-27 RX ADMIN — OXYCODONE HYDROCHLORIDE 5 MG: 5 TABLET ORAL at 01:09

## 2021-08-27 RX ADMIN — HEPARIN SODIUM 5000 UNITS: 5000 INJECTION, SOLUTION INTRAVENOUS; SUBCUTANEOUS at 13:46

## 2021-08-27 RX ADMIN — HEPARIN SODIUM 5000 UNITS: 5000 INJECTION, SOLUTION INTRAVENOUS; SUBCUTANEOUS at 21:17

## 2021-08-27 RX ADMIN — ASPIRIN 81 MG: 81 TABLET, COATED ORAL at 08:35

## 2021-08-27 RX ADMIN — ATORVASTATIN CALCIUM 20 MG: 20 TABLET, FILM COATED ORAL at 18:36

## 2021-08-27 RX ADMIN — CYCLOBENZAPRINE HYDROCHLORIDE 10 MG: 10 TABLET, FILM COATED ORAL at 04:22

## 2021-08-27 RX ADMIN — POLYETHYLENE GLYCOL 3350 17 G: 17 POWDER, FOR SOLUTION ORAL at 08:34

## 2021-08-27 RX ADMIN — PANTOPRAZOLE SODIUM 40 MG: 40 TABLET, DELAYED RELEASE ORAL at 08:35

## 2021-08-27 RX ADMIN — CYCLOBENZAPRINE HYDROCHLORIDE 10 MG: 10 TABLET, FILM COATED ORAL at 12:24

## 2021-08-27 ASSESSMENT — COGNITIVE AND FUNCTIONAL STATUS - GENERAL
HELP NEEDED FOR PERSONAL GROOMING: 3 - A LITTLE
HELP NEEDED FOR BATHING: 3 - A LITTLE
DRESSING REGULAR UPPER BODY CLOTHING: 3 - A LITTLE
CLIMB 3 TO 5 STEPS WITH RAILING: 4 - NONE
AFFECT: WFL
TOILETING: 3 - A LITTLE
STANDING UP FROM CHAIR USING ARMS: 4 - NONE
WALKING IN HOSPITAL ROOM: 4 - NONE
AFFECT: WFL
MOVING TO AND FROM BED TO CHAIR: 4 - NONE
DRESSING REGULAR LOWER BODY CLOTHING: 3 - A LITTLE
EATING MEALS: 4 - NONE

## 2021-08-27 NOTE — PROGRESS NOTES
Hospital Medicine Service -  Daily Progress Note       SUBJECTIVE   Interval History:Pt seen and examined. Denies any Cp or SOB, no dizziness or lightheadedness, no N/V.     OBJECTIVE      Vital signs in last 24 hours:  Temp:  [36.3 °C (97.4 °F)-37.3 °C (99.1 °F)] 36.3 °C (97.4 °F)  Heart Rate:  [70-98] 82  Resp:  [12-27] 12  BP: ()/(54-64) 116/57    Intake/Output Summary (Last 24 hours) at 8/27/2021 1319  Last data filed at 8/27/2021 1000  Gross per 24 hour   Intake 450 ml   Output 1205 ml   Net -755 ml       PHYSICAL EXAMINATION      Physical Exam  Constitutional:       Appearance: Normal appearance.   HENT:      Head: Normocephalic and atraumatic.   Eyes:      Conjunctiva/sclera: Conjunctivae normal.   Cardiovascular:      Rate and Rhythm: Normal rate and regular rhythm.      Heart sounds: Normal heart sounds.   Pulmonary:      Effort: Pulmonary effort is normal.      Breath sounds: Normal breath sounds.   Abdominal:      General: Bowel sounds are normal. There is no distension.      Palpations: Abdomen is soft.      Tenderness: There is no abdominal tenderness.   Musculoskeletal:      Cervical back: Neck supple.   Skin:     General: Skin is warm and dry.   Neurological:      Mental Status: He is alert and oriented to person, place, and time.   Psychiatric:         Mood and Affect: Mood normal.         Behavior: Behavior normal.            LINES, CATHETERS, DRAINS, AIRWAYS, AND WOUNDS   Lines, Drains, and Airways:  Wounds (agree with documentation and present on admission):  Peripheral IV (Adult) 08/26/21 Anterior;Distal;Left Forearm (Active)   Number of days: 1       Drain 1 Back (Active)   Number of days: 1       Surgical Incision Back (Active)   Number of days: 1         Comments:      LABS / IMAGING / TELE      Labs  Reviewed  Results from last 7 days   Lab Units 08/27/21  0517   WBC K/uL 13.13*   HEMOGLOBIN g/dL 12.1*   HEMATOCRIT % 37.1*   PLATELETS K/uL 158     Results from last 7 days   Lab Units  08/27/21  0517   SODIUM mEQ/L 136   POTASSIUM mEQ/L 3.7   CHLORIDE mEQ/L 106   CO2 mEQ/L 22   BUN mg/dL 15   CREATININE mg/dL 1.2   GLUCOSE mg/dL 120*   CALCIUM mg/dL 8.1*       SARS-CoV-2 (COVID-19) (no units)   Date/Time Value   08/23/2021 1113 Negative       Imaging  n/a    ECG/Telemetry  n/a    ASSESSMENT AND PLAN      Status post lumbar spine surgery for decompression of spinal cord  Assessment & Plan  POD 1  Wound care, PT/OT per ortho recs  Prn pain control, bowel regimen  DVT proph  Encouraged incentive spirometry  Follow post op labs    Hyperglycemia  Assessment & Plan  Noted on preop labs. This was nonfasting blood work.  No history of diabetes.  Recommended follow-up with primary care doctor for further evaluation if no recent diabetic screening.   Monitor post op    BPH (benign prostatic hyperplasia)  Assessment & Plan  At increased risk for urinary retention post op. Monitor urine output closely post op for any signs of retention  Recommend early ambulation and aggressive bowel regimen on narcotics post op.    History of CVA (cerebrovascular accident)  Assessment & Plan  On aspirin, Plavix and statin  Resume antiplatelets as soon as possible when okay from surgery standpoint.    S/P CABG (coronary artery bypass graft)  Assessment & Plan  Hx of CABG  On aspirin, plavix and statin  Per cardiology recs: can hold plavix 5 days preop and cont baby aspirin through surgery  Hold fish oil for now  Resume antiplatelets as soon as possible when okay from surgery standpoint.         VTE Assessment: [place]    VTE Prophylaxis:  Current anticoagulants:  heparin (porcine) 5,000 unit/mL injection 5,000 Units, subcutaneous, q8h FRANCO      Code Status: Full Code      Estimated Discharge Date: 8/28/2021   Disposition Planning: per orthopedics     Lazarus Ham MD  8/27/2021

## 2021-08-27 NOTE — NURSING NOTE
Random bladder scan 417.  No urge to void. Not uncomfortable. Ortho resident notified and will continue to monitor.

## 2021-08-27 NOTE — PLAN OF CARE
Problem: Adult Inpatient Plan of Care  Goal: Readiness for Transition of Care  Outcome: Progressing     Problem: Adult Inpatient Plan of Care  Goal: Readiness for Transition of Care  Intervention: Mutually Develop Transition Plan  Flowsheets  Taken 8/27/2021 1128  Equipment Needed After Discharge: none  Assistive Device/Animal Currently Used at Home: (Owns SPC)   none   other (see comments)  Anticipated Changes Related to Illness: none  Transportation Concerns: car, none  Readmission Within the Last 30 Days: no previous admission in last 30 days  Patient/Family Anticipated Services at Transition: none  Patient/Family Anticipates Transition to: home with family  Transportation Anticipated: family or friend will provide  Concerns to be Addressed: no discharge needs identified  Taken 8/27/2021 1127  Anticipated Discharge Disposition: home without assistance or services     Pt s/p L5-S1 PLDF on 8/26 with . SW met with pt at the bedside this morning. Pt reported he lives with his wife in a 1SH with 4+4 SPENCER. Pt has a tub shower. Pt owns an SPC. Pt has no O2, AD/LW, or SNF stay. Pt has previous HC, unsure of agency. PCP and Pharmacy confirmed. Pt's wife will transport at d/c. No d/c needs identified.     Discharge Plan  Home  Wife will transport

## 2021-08-27 NOTE — PROGRESS NOTES
Patient:  Ko Shearer  Location:  [facility] 5PAV 5412  MRN:  [id number]  Today's date:  8/27/2021       Patient returned to supine in head elevated bed w/ alarm activated & call bell nearby     Ko is a 80 y.o. male admitted on 8/26/2021 with Spinal stenosis, lumbar region, with neurogenic claudication [M48.062]  S/P lumbar fusion [Z98.1]. Principal problem is No Principal Problem: There is no principal problem currently on the Problem List. Please update the Problem List and refresh..    Past Medical History  Ko has a past medical history of Arthritis, Asthma, BPH (benign prostatic hyperplasia), Coronary artery disease, Diverticulitis of colon, Esophageal abnormality, Esophageal stricture, Frequent UTI, GERD (gastroesophageal reflux disease), H/O migraine, Kidney stones (01/2020), Lipid disorder, Lumbar stenosis, Skin cancer, Skin cancer, SOB (shortness of breath), Stroke (CMS/[facility]) (2014), and Thoracic spine fracture (CMS/[facility]).    History of Present Illness    80 year old male s/p L5-S1 PLDF by Dr Rodas on 8/26/21  Spinal precautions       OT Vitals    Date/Time Pulse SpO2 Pt Activity O2 Therapy BP [facility]   08/27/21 1452 81 94 % At rest None (Room air) 116/56 CP      OT Pain    Date/Time Pain Type Location Rating: Rest Rating: Activity Interventions [facility]   08/27/21 1452 Pain Assessment back 2 - mild pain 2 - mild pain position adjusted CP          Prior Living Environment      Most Recent Value   People in Home  spouse   Current Living Arrangements  home/apartment/condo   Living Environment Comment  Lives w/ spouse in 2SH w/ first floor MI . B & tub shower on 2nd floor         Prior Level of Function      Most Recent Value   Ambulation  independent   Transferring  independent   Toileting  independent   Bathing  independent   Dressing  independent   Eating  independent   Communication  understands/communicates without difficulty   Prior Level of Function Comment  PTA: IND w/ adl's / mobility w/o  device    Assistive Device Currently Used at Home  none, other (see comments) [Owns [facility]]        Occupational Profile      Most Recent Value   Reason for Services/Referral  ADL/ambulation dysfx   Patient Goals   to get stronger           OT Evaluation and Treatment - 08/27/21 1452        OT Time Calculation    Start Time  1452     Stop Time  1516     Time Calculation (min)  24 min        Session Details    Document Type  daily treatment/progress note     Mode of Treatment  occupational therapy        General Information    Patient Profile Reviewed  yes     Patient/Family/Caregiver Comments/Observations  Wife present during session     General Observations of Patient  Patient received OOB in recliner      Existing Precautions/Restrictions  fall;spinal;weight bearing     Limitations/Impairments  --    spinal drain       Weight-bearing Status    Left LE Weight-Bearing Status  weight-bearing as tolerated (WBAT)     Right LE Weight-Bearing Status  weight-bearing as tolerated (WBAT)        Cognition/Psychosocial    Affect/Mental Status (Cognition)  WFL     Orientation Status (Cognition)  oriented x 4     Follows Commands (Cognition)  WFL     Cognitive Function  WFL        Hearing Assessment    Hearing Status  WFL        Vision Assessment/Intervention    Visual Impairment/Limitations  corrective lenses for reading        Sensory Assessment (Somatosensory)    Sensory Assessment (Somatosensory)  UE sensation intact        Range of Motion (ROM)    Range of Motion  ROM is WFL;bilateral upper extremities        Strength (Manual Muscle Testing)    Strength (Manual Muscle Testing)  strength is WFL;bilateral upper extremities        Bed Mobility    [place], Supine to Sit  supervision     [place], Sit to Supine  supervision     Comment (Bed Mobility)  flat surface         Transfers    Transfers  toilet transfer     Maintains Weight-Bearing Status (Transfers)  able to maintain     Comment  SUP w/ RW         Bed to Chair  Transfer    [place], Bed to Chair  supervision     Verbal Cues  hand placement;safety;technique     Assistive Device  walker, front-wheeled        Chair to Bed Transfer    [place], Chair to Bed  supervision     Verbal Cues  hand placement;safety;technique     Assistive Device  walker, front-wheeled        Sit to Stand Transfer    [place], Sit to Stand Transfer  supervision     Verbal Cues  hand placement;safety;technique     Assistive Device  walker, front-wheeled        Stand to Sit Transfer    [place], Stand to Sit Transfer  supervision     Verbal Cues  safety;technique     Assistive Device  walker, front-wheeled        Toilet Transfer    Transfer Technique  --    standing at toilet     [place], Toilet Transfer  supervision     Verbal Cues  safety;technique     Assistive Device  grab bars/safety frame        Balance    Static Sitting Balance  WFL;supported     Dynamic Sitting Balance  mild impairment    2* spinal precautions     Sit to Stand Dynamic Balance  WFL;mild impairment     Static Standing Balance  WFL;mild impairment     Dynamic Standing Balance  WFL;mild impairment        Lower Body Dressing    Self-Performance  dons/doffs left sock;dons/doffs right sock     [place]  close supervision     Position  supported sitting     Comment  Instructed in LE adaptivev equipment to maximize IND  w/ LE adl's while observing spinal precautions         Grooming    Self-Performance  washes, rinses and dries hands     [place]  supervision     Position  sink side;supported sitting     Comment  w/ RW         Toileting    [place]  perform bladder hygiene     Position  supported sitting     Adaptive Equipment  grab bar/safety frame        AM-PAC (TM) - ADL (Current Function)    Putting on and taking off regular lower body clothing?  3 - A Little     Bathing?  3 - A Little     Toileting?  3 - A Little     Putting on/taking off regular upper body clothing?  3 - A Little     How much help  for taking care of personal grooming?  3 - A Little     Eating meals?  4 - None     AM-PAC (TM) ADL Score  19                       Education Documentation  Relaxation, taught by Anjali Forte OT at 8/27/2021  6:38 PM.  Learner: Family  Readiness: Eager  Method: Explanation  Response: Verbalizes Understanding  Comment: Instructed in econ techniques to maximize IND & endurance w/ adl's, bed mobility plus Rw mobility activities    Relaxation, taught by Anjali Forte OT at 8/27/2021  6:38 PM.  Learner: Patient  Readiness: Eager  Method: Explanation  Response: Verbalizes Understanding  Comment: Instructed in econ techniques to maximize IND & endurance w/ adl's, bed mobility plus Rw mobility activities    Proper Positioning, taught by Anjali Forte OT at 8/27/2021  6:38 PM.  Learner: Family  Readiness: Eager  Method: Explanation  Response: Verbalizes Understanding  Comment: Instructed in econ techniques to maximize IND & endurance w/ adl's, bed mobility plus Rw mobility activities    Proper Positioning, taught by Anjali Forte OT at 8/27/2021  6:38 PM.  Learner: Patient  Readiness: Eager  Method: Explanation  Response: Verbalizes Understanding  Comment: Instructed in econ techniques to maximize IND & endurance w/ adl's, bed mobility plus Rw mobility activities    Prioritize Tasks, taught by Anjali Forte OT at 8/27/2021  6:38 PM.  Learner: Family  Readiness: Eager  Method: Explanation  Response: Verbalizes Understanding  Comment: Instructed in econ techniques to maximize IND & endurance w/ adl's, bed mobility plus Rw mobility activities    Prioritize Tasks, taught by Anjali Forte OT at 8/27/2021  6:38 PM.  Learner: Patient  Readiness: Eager  Method: Explanation  Response: Verbalizes Understanding  Comment: Instructed in econ techniques to maximize IND & endurance w/ adl's, bed mobility plus Rw mobility activities    Pace Activity, taught by Anjali Forte OT at 8/27/2021  6:38 PM.  Learner: Family  Readiness:  Eager  Method: Explanation  Response: Verbalizes Understanding  Comment: Instructed in econ techniques to maximize IND & endurance w/ adl's, bed mobility plus Rw mobility activities    Pace Activity, taught by Anjali Forte OT at 8/27/2021  6:38 PM.  Learner: Patient  Readiness: Eager  Method: Explanation  Response: Verbalizes Understanding  Comment: Instructed in econ techniques to maximize IND & endurance w/ adl's, bed mobility plus Rw mobility activities    Breathing, taught by Anjali Forte OT at 8/27/2021  6:38 PM.  Learner: Family  Readiness: Eager  Method: Explanation  Response: Verbalizes Understanding  Comment: Instructed in econ techniques to maximize IND & endurance w/ adl's, bed mobility plus Rw mobility activities    Breathing, taught by Anjali Forte OT at 8/27/2021  6:38 PM.  Learner: Patient  Readiness: Eager  Method: Explanation  Response: Verbalizes Understanding  Comment: Instructed in econ techniques to maximize IND & endurance w/ adl's, bed mobility plus Rw mobility activities    Purpose, taught by Anjali Forte OT at 8/27/2021  6:38 PM.  Learner: Family  Readiness: Eager  Method: Explanation  Response: Verbalizes Understanding  Comment: Instructed in econ techniques to maximize IND & endurance w/ adl's, bed mobility plus Rw mobility activities    Purpose, taught by Anjali Forte OT at 8/27/2021  6:38 PM.  Learner: Patient  Readiness: Eager  Method: Explanation  Response: Verbalizes Understanding  Comment: Instructed in econ techniques to maximize IND & endurance w/ adl's, bed mobility plus Rw mobility activities          OT Assessment/Plan      Most Recent Value   Daily Outcome Statement  [facility] 19 : Patient is making gains w/ his LE adl's, flat bed mobility , STS toileting plus Rw mobility activities w/ incr strength /endurance  at 08/27/2021 1452   OT Recommended Discharge Disposition  home with assistance at 08/27/2021 1452   Rehab Potential  good, to achieve stated therapy goals at  08/27/2021 1452   Therapy Frequency  -- [cleared for d/c home ] at 08/27/2021 1452   Planned Therapy Interventions  -- [cleared for d/c home ] at 08/27/2021 1452   Anticipated Equipment Needs At Discharge (OT)  dressing equipment, walker, front-wheeled at 08/27/2021 1452   Patient/Family Therapy Goal Statement  to get stronger  at 08/27/2021 1452          OT Goals      Most Recent Value   Bed Mobility Goal 1   Activity/Assistive Device  sit to supine, supine to sit at 08/27/2021 1452   [place]  supervision required at 08/27/2021 1452   Time Frame  by discharge at 08/27/2021 1452   Progress/Outcome  goal met at 08/27/2021 1452   Transfer Goal 1   Activity/Assistive Device  sit-to-stand/stand-to-sit, bed-to-chair/chair-to-bed, walker, front-wheeled at 08/27/2021 1452   [place]  supervision required at 08/27/2021 1452   Time Frame  by discharge at 08/27/2021 1452   Progress/Outcome  goal met at 08/27/2021 1452   Dressing Goal 1   Activity/Adaptive Equipment  lower body dressing at 08/27/2021 1452   [place]  supervision required at 08/27/2021 1452   Time Frame  by discharge at 08/27/2021 1452   Progress/Outcome  goal partially met at 08/27/2021 1452

## 2021-08-27 NOTE — PATIENT CARE CONFERENCE
Care Progression Rounds Note  Date: 8/27/2021  Time: 10:21 AM     Patient Name: Ko Shearer     Medical Record Number: [id number]   YOB: 1940  Sex: Male      Room/Bed: 5412    Admitting Diagnosis: Spinal stenosis, lumbar region, with neurogenic claudication [M48.062]  S/P lumbar fusion [Z98.1]   Admit Date/Time: 8/26/2021  5:17 AM    Primary Diagnosis: No Principal Problem: There is no principal problem currently on the Problem List. Please update the Problem List and refresh.  Principal Problem: No Principal Problem: There is no principal problem currently on the Problem List. Please update the Problem List and refresh.    GMLOS: pending  Anticipated Discharge Date: 8/28/2021    AM-PAC:  Mobility Score: 24    Discharge Planning:  Current Living Arrangements: home/apartment/condo    Barriers to Discharge:  Barriers to Discharge: Medical issues not resolved, Therapy update pending    Participants:  advanced practice provider, , nursing, physical therapy, social work/services

## 2021-08-27 NOTE — PROGRESS NOTES
Orthopaedic Spine Surgery Progress Note    [S]  No acute events overnight. Patient mobilizing well. Tolerating diet. -flatus/ No BM yet.      [O]  Vitals:    08/27/21 0518   BP: (!) 94/56   Pulse: 78   Resp: 16   Temp: 36.9 °C (98.4 °F)   SpO2: 94%       Physical Exam:  Dressing in place, clean, dry, intact.  Drain: HV x 1      RLE:  Inspection: No gross deformity. Skin intact.   Neurovascular: Palpable DP pulse. SILT L2-S1 distibution.  Motor: IP 5/5, Quad 5/5, TA 5/5, EHL 5/5, GS 5/5    LLE:  Inspection: No gross deformity. Skin intact.   Neurovascular: Palpable DP pulse. SILT L2-S1 distibution.  Motor: IP 5/5, Quad 5/5, TA 5/5, EHL 5/5, GS 5/5    [AP]   80 y.o. male 1 Day Post-Op status post L5-S1 PLDF    -- Pain control  -- WBAT  -- DVT: SQH POD1  -- Advance diet as tolerated  -- Monitor HV drain output  -- D/c harris  -- PT/OT  -- Monitor neurovascular status    Ariana Reyes, MD

## 2021-08-27 NOTE — PROGRESS NOTES
Patient: Ko Shearer  Location:  [facility] 5PAV [id number]  MRN:  [id number]  Today's date:  8/27/2021     Patient returned to seated position in recliner, NAD  Chair alarm activated  All personal needs within reach  Nsg notified of pt's performance       Ko is a 80 y.o. male admitted on 8/26/2021 with Spinal stenosis, lumbar region, with neurogenic claudication [M48.062]  S/P lumbar fusion [Z98.1]. Principal problem is No Principal Problem: There is no principal problem currently on the Problem List. Please update the Problem List and refresh..    Past Medical History  Ko has a past medical history of Arthritis, Asthma, BPH (benign prostatic hyperplasia), Coronary artery disease, Diverticulitis of colon, Esophageal abnormality, Esophageal stricture, Frequent UTI, GERD (gastroesophageal reflux disease), H/O migraine, Kidney stones (01/2020), Lipid disorder, Lumbar stenosis, Skin cancer, Skin cancer, SOB (shortness of breath), Stroke (CMS/[facility]) (2014), and Thoracic spine fracture (CMS/[facility]).    History of Present Illness    80 year old male s/p L5-S1 PLDF by Dr Rodas on 8/26/21       PT Vitals    Date/Time Pulse HR Source Resp SpO2 Pt Activity O2 Therapy BP BP Location BP Method Pt Position [facility]   08/27/21 0718 78 -- -- 94 % At rest None (Room air) 100/54 -- Automatic Lying [place]   08/27/21 0733 79 Monitor 16 92 % At rest None (Room air) 116/55 Right upper arm Automatic Sitting MANJU      PT Pain    Date/Time Pain Type Location Rating: Rest Rating: Activity Interventions [facility]   08/27/21 0718 Pain Assessment back 5 6 position adjusted [place]          Prior Living Environment      Most Recent Value   People in Home  spouse   Current Living Arrangements  home/apartment/condo   Living Environment Comment  Lives w/ spouse in 2SH w/ first floor [place] . B & tub shower on 2nd floor         Prior Level of Function      Most Recent Value   Ambulation  independent   Transferring  independent   Toileting  independent   Bathing   independent   Dressing  independent   Eating  independent   Communication  understands/communicates without difficulty   Prior Level of Function Comment  PTA: IND w/ adl's / mobility w/o device    Assistive Device Currently Used at Home  none          PT Evaluation and Treatment - 08/27/21 0718        PT Time Calculation    Start Time  0718     Stop Time  0741     Time Calculation (min)  23 min        Session Details    Document Type  daily treatment/progress note     Mode of Treatment  physical therapy        General Information    Patient Profile Reviewed  yes     Onset of Illness/Injury or Date of Surgery  08/26/21     Referring Physician  Adrianna     General Observations of Patient  Pt received supine in bed awake and alert     Existing Precautions/Restrictions  fall;spinal;weight bearing;head of bed elevated 30 degrees        Weight-bearing Status    Left LE Weight-Bearing Status  weight-bearing as tolerated (WBAT)     Right LE Weight-Bearing Status  weight-bearing as tolerated (WBAT)        Cognition/Psychosocial    Affect/Mental Status (Cognition)  WFL     Orientation Status (Cognition)  oriented x 4     Follows Commands (Cognition)  WFL     Cognitive Function  WFL     Comment, Cognition  pleasant and cooperative, educated regarding spinal precautions         Bed Mobility    Bed Mobility  supine to sit to supine     [place], Supine to Sit  independent     Assistive Device  none     Comment (Bed Mobility)  flat bed, OOB to L, able to demo log roll technique without assist provided        Transfers    Transfers  car transfer     Maintains Weight-Bearing Status (Transfers)  able to maintain        Sit to Stand Transfer    [place], Sit to Stand Transfer  modified independence     Assistive Device  walker, front-wheeled     Comment  from EOB, denies dizziness, no assist needed, good std balance at first stance        Stand to Sit Transfer    [place], Stand to Sit Transfer  modified independence      Assistive Device  walker, front-wheeled     Comment  returned to recliner, good eccentric control        Car Transfer    Transfer Technique  sit-stand;stand-sit     [place], Car Transfer  modified independence     Assistive Device  walker, front-wheeled     Comment  car height simulated to pt's car to be DC home- cues to ensure back of seat is reclined for ease of entry into ca        Gait Training    [place], Gait  modified independence     Assistive Device  walker, front-wheeled     Distance in Feet  250 feet     Pattern (Gait)  step-through     Deviations/Abnormal Patterns (Gait)  weight shifting decreased;stride length decreased;step length decreased;antalgic     Maintains Weight-bearing Status (Gait)  able to maintain     Comment (Gait/Stairs)  denies dizziness, steady reciprocal gait pattern, no overt LOB        Stairs Training    [place], Stairs  modified independence     Assistive Device  railing     Handrail Location (Stairs)  left side (ascending);left side (descending)     Number of Stairs  4     Stair Height  6 inches     Ascending Stairs Technique  step-to-step     Descending Stairs Technique  step-to-step     Maintains Weight-bearing Status (Stairs)  able to maintain     Comment  clears steps without difficulty, denies dizziness        Safety Issues, Functional Mobility    Impairments Affecting Function (Mobility)  pain        Balance    Balance Assessment  sitting static balance;sit to stand dynamic balance;standing dynamic balance;standing static balance     Static Sitting Balance  WFL;sitting, edge of bed;unsupported     Sit to Stand Dynamic Balance  WFL;supported     Static Standing Balance  WFL;supported     Dynamic Standing Balance  WFL;supported     Comment, Balance  Good std tolerance with use of RW- no balance deficits or lob noted throughout session         AM-PAC (TM) - Mobility (Current Function)    Turning from your back to your side while in a flat bed without using  bedrails?  4 - None     Moving from lying on your back to sitting on the side of a flat bed without using bedrails?  4 - None     Moving to and from a bed to a chair?  4 - None     Standing up from a chair using your arms?  4 - None     To walk in a hospital room?  4 - None     Climbing 3-5 steps with a railing?  4 - None     AM-PAC (TM) Mobility Score  24                       Education Documentation  Unresolved/Worsening Symptoms, taught by Britney Cornejo PT at 8/27/2021  9:04 AM.  Learner: Patient  Readiness: Acceptance  Method: Demonstration, Explanation  Response: Verbalizes Understanding, Demonstrated Understanding  Comment: Reviewed PT POC, safety with mobility, role of PT, tx tech, gait training, stair training, car tx, spinal precautions    Joint Mobility/Strength, taught by Britney Cornejo PT at 8/27/2021  9:04 AM.  Learner: Patient  Readiness: Acceptance  Method: Demonstration, Explanation  Response: Verbalizes Understanding, Demonstrated Understanding  Comment: Reviewed PT POC, safety with mobility, role of PT, tx tech, gait training, stair training, car tx, spinal precautions    Home Safety, taught by Britney Cornejo PT at 8/27/2021  9:04 AM.  Learner: Patient  Readiness: Acceptance  Method: Demonstration, Explanation  Response: Verbalizes Understanding, Demonstrated Understanding  Comment: Reviewed PT POC, safety with mobility, role of PT, tx tech, gait training, stair training, car tx, spinal precautions    Energy Conservation, taught by Britney Cornejo PT at 8/27/2021  9:04 AM.  Learner: Patient  Readiness: Acceptance  Method: Demonstration, Explanation  Response: Verbalizes Understanding, Demonstrated Understanding  Comment: Reviewed PT POC, safety with mobility, role of PT, tx tech, gait training, stair training, car tx, spinal precautions    Assistive/Adaptive Devices, taught by Britney Cornejo PT at 8/27/2021  9:04 AM.  Learner: Patient  Readiness: Acceptance  Method:  Demonstration, Explanation  Response: Verbalizes Understanding, Demonstrated Understanding  Comment: Reviewed PT POC, safety with mobility, role of PT, tx tech, gait training, stair training, car tx, spinal precautions    Signs/Symptoms, taught by Britney Cornejo PT at 8/27/2021  9:04 AM.  Learner: Patient  Readiness: Acceptance  Method: Demonstration, Explanation  Response: Verbalizes Understanding, Demonstrated Understanding  Comment: Reviewed PT POC, safety with mobility, role of PT, tx tech, gait training, stair training, car tx, spinal precautions    Risk Factors, taught by Britney Cornejo PT at 8/27/2021  9:04 AM.  Learner: Patient  Readiness: Acceptance  Method: Demonstration, Explanation  Response: Verbalizes Understanding, Demonstrated Understanding  Comment: Reviewed PT POC, safety with mobility, role of PT, tx tech, gait training, stair training, car tx, spinal precautions          PT Assessment/Plan      Most Recent Value   Daily Outcome Statement  8/27/21 Pt is a 80 year old male s/p L5-S1 PLDF post op day one,  Patient able to recall spinal precautions and maintains throughout session,  pt demonstrates improved  safety awareness and completed all functional tasks without assist,  ambulating well over household length distances,  pt encouraged to ambulate when DC home to progress mobility,  no further skilled PT needs identified at 08/27/2021 0718   PT Recommended Discharge Disposition  home with assistance at 08/27/2021 0718   Rehab Potential  good, to achieve stated therapy goals at 08/26/2021 1622   Therapy Frequency  daily at 08/26/2021 1622   Planned Therapy Interventions  balance training, bed mobility training, gait training, patient/family education, stair training, strengthening, transfer training at 08/26/2021 1622   Anticipated Equipment Needs at Discharge (PT)  walker, front-wheeled at 08/27/2021 0718          PT Goals      Most Recent Value   Bed Mobility Goal 1   Activity/Assistive  Device  sit to supine/supine to sit at 08/26/2021 1622   [place]  modified independence at 08/26/2021 1622   Time Frame  by discharge at 08/26/2021 1622   Progress/Outcome  goal met at 08/27/2021 0718   Transfer Goal 1   Activity/Assistive Device  sit-to-stand/stand-to-sit, walker, front-wheeled at 08/26/2021 1622   [place]  modified independence at 08/26/2021 1622   Time Frame  by discharge at 08/26/2021 1622   Progress/Outcome  goal met at 08/27/2021 0718   Gait Training Goal 1   Activity/Assistive Device  gait (walking locomotion), assistive device use, walker, front-wheeled at 08/26/2021 1622   [place]  modified independence at 08/26/2021 1622   Distance  150 at 08/26/2021 1622   Time Frame  by discharge at 08/26/2021 1622   Progress/Outcome  goal met at 08/27/2021 0718   Stairs Goal 1   Activity/Assistive Device  ascending stairs, descending stairs, using handrail, left, step-to-step at 08/26/2021 1622   [place]  modified independence at 08/26/2021 1622   Number of Stairs  12 at 08/26/2021 1622   Time Frame  by discharge at 08/26/2021 1622   Progress/Outcome  goal partially met at 08/27/2021 0718   Precaution Goal 1   Activity  spinal precautions at 08/26/2021 1622   [place]  independently at 08/26/2021 1622   Time Frame  by discharge at 08/26/2021 1622   Progress/Outcome  goal met at 08/27/2021 0718

## 2021-08-27 NOTE — PLAN OF CARE
Problem: Functional Ability Impaired (Spinal Surgery)  Goal: Optimal Functional Ability  Outcome: Progressing     Problem: Adult Inpatient Plan of Care  Goal: Plan of Care Review  Outcome: Progressing  Flowsheets (Taken 8/27/2021 1064)  Progress: improving  Plan of Care Reviewed With:   patient   spouse  Outcome Summary: D/C OT . Patient just  about at baseline. OT recommendations provided during session. Supportive family available. NO additional acute care goals at this time.     Problem: Functional Ability Impaired (Spinal Surgery)  Goal: Optimal Functional Ability  Outcome: Progressing

## 2021-08-28 VITALS
RESPIRATION RATE: 17 BRPM | TEMPERATURE: 98.3 F | DIASTOLIC BLOOD PRESSURE: 67 MMHG | WEIGHT: 179 LBS | HEIGHT: 71 IN | BODY MASS INDEX: 25.06 KG/M2 | HEART RATE: 72 BPM | SYSTOLIC BLOOD PRESSURE: 122 MMHG | OXYGEN SATURATION: 97 %

## 2021-08-28 LAB
ANION GAP SERPL CALC-SCNC: 6 MEQ/L (ref 3–15)
BUN SERPL-MCNC: 21 MG/DL (ref 8–20)
CALCIUM SERPL-MCNC: 8.5 MG/DL (ref 8.9–10.3)
CHLORIDE SERPL-SCNC: 109 MEQ/L (ref 98–109)
CO2 SERPL-SCNC: 22 MEQ/L (ref 22–32)
CREAT SERPL-MCNC: 1 MG/DL (ref 0.8–1.3)
ERYTHROCYTE [DISTWIDTH] IN BLOOD BY AUTOMATED COUNT: 13.5 % (ref 11.6–14.4)
GFR SERPL CREATININE-BSD FRML MDRD: >60 ML/MIN/1.73M*2
GLUCOSE SERPL-MCNC: 126 MG/DL (ref 70–99)
HCT VFR BLDCO AUTO: 34.2 % (ref 40.1–51)
HGB BLD-MCNC: 11.6 G/DL (ref 13.7–17.5)
MCH RBC QN AUTO: 35.8 PG (ref 28–33.2)
MCHC RBC AUTO-ENTMCNC: 33.9 G/DL (ref 32.2–36.5)
MCV RBC AUTO: 105.6 FL (ref 83–98)
PDW BLD AUTO: 9.9 FL (ref 9.4–12.4)
PLATELET # BLD AUTO: 147 K/UL (ref 150–350)
POTASSIUM SERPL-SCNC: 4.2 MEQ/L (ref 3.6–5.1)
RBC # BLD AUTO: 3.24 M/UL (ref 4.5–5.8)
SODIUM SERPL-SCNC: 137 MEQ/L (ref 136–144)
WBC # BLD AUTO: 14.33 K/UL (ref 3.8–10.5)

## 2021-08-28 PROCEDURE — 85027 COMPLETE CBC AUTOMATED: CPT | Performed by: NURSE PRACTITIONER

## 2021-08-28 PROCEDURE — 63700000 HC SELF-ADMINISTRABLE DRUG: Performed by: STUDENT IN AN ORGANIZED HEALTH CARE EDUCATION/TRAINING PROGRAM

## 2021-08-28 PROCEDURE — 63600000 HC DRUGS/DETAIL CODE: Performed by: NURSE PRACTITIONER

## 2021-08-28 PROCEDURE — 80048 BASIC METABOLIC PNL TOTAL CA: CPT | Performed by: NURSE PRACTITIONER

## 2021-08-28 PROCEDURE — 63700000 HC SELF-ADMINISTRABLE DRUG: Performed by: NURSE PRACTITIONER

## 2021-08-28 PROCEDURE — 36415 COLL VENOUS BLD VENIPUNCTURE: CPT | Performed by: NURSE PRACTITIONER

## 2021-08-28 RX ORDER — ACETAMINOPHEN 650 MG/20.3ML
650 LIQUID ORAL EVERY 4 HOURS PRN
Status: DISCONTINUED | OUTPATIENT
Start: 2021-08-28 | End: 2021-08-28 | Stop reason: HOSPADM

## 2021-08-28 RX ORDER — ACETAMINOPHEN 650 MG/1
650 SUPPOSITORY RECTAL EVERY 4 HOURS PRN
Status: DISCONTINUED | OUTPATIENT
Start: 2021-08-28 | End: 2021-08-28 | Stop reason: HOSPADM

## 2021-08-28 RX ORDER — CALCIUM CARBONATE 200(500)MG
500 TABLET,CHEWABLE ORAL 3 TIMES DAILY PRN
Status: DISCONTINUED | OUTPATIENT
Start: 2021-08-28 | End: 2021-08-28 | Stop reason: HOSPADM

## 2021-08-28 RX ORDER — ACETAMINOPHEN 325 MG/1
650 TABLET ORAL EVERY 4 HOURS PRN
Status: DISCONTINUED | OUTPATIENT
Start: 2021-08-28 | End: 2021-08-28 | Stop reason: HOSPADM

## 2021-08-28 RX ADMIN — POLYETHYLENE GLYCOL 3350 17 G: 17 POWDER, FOR SOLUTION ORAL at 08:36

## 2021-08-28 RX ADMIN — HEPARIN SODIUM 5000 UNITS: 5000 INJECTION, SOLUTION INTRAVENOUS; SUBCUTANEOUS at 05:27

## 2021-08-28 RX ADMIN — ACETAMINOPHEN 650 MG: 325 TABLET, FILM COATED ORAL at 08:36

## 2021-08-28 RX ADMIN — OXYCODONE HYDROCHLORIDE 5 MG: 5 TABLET ORAL at 12:24

## 2021-08-28 RX ADMIN — ASPIRIN 81 MG: 81 TABLET, COATED ORAL at 08:36

## 2021-08-28 RX ADMIN — PANTOPRAZOLE SODIUM 40 MG: 40 TABLET, DELAYED RELEASE ORAL at 08:36

## 2021-08-28 RX ADMIN — ACETAMINOPHEN 650 MG: 325 TABLET, FILM COATED ORAL at 12:25

## 2021-08-28 RX ADMIN — BISACODYL 10 MG: 10 SUPPOSITORY RECTAL at 12:18

## 2021-08-28 RX ADMIN — DOCUSATE SODIUM AND SENNOSIDES 1 TABLET: 8.6; 5 TABLET, FILM COATED ORAL at 08:36

## 2021-08-28 NOTE — PROGRESS NOTES
Orthopaedic Spine Surgery Progress Note    [S]  No acute events overnight. Patient mobilizing well. Tolerating diet. + flatus/ No BM yet. Voided adequately.      [O]  Vitals:    08/27/21 2337   BP:    Pulse: 80   Resp:    Temp:    SpO2: 95%       Physical Exam:  Dressing in place, clean, dry, intact.  Drain: HV x 1 > 20 / 205      RLE:  Inspection: No gross deformity. Skin intact.   Neurovascular: Palpable DP pulse. SILT L2-S1 distibution.  Motor: IP 5/5, Quad 5/5, TA 5/5, EHL 5/5, GS 5/5    LLE:  Inspection: No gross deformity. Skin intact.   Neurovascular: Palpable DP pulse. SILT L2-S1 distibution.  Motor: IP 5/5, Quad 5/5, TA 5/5, EHL 5/5, GS 5/5    [AP]   80 y.o. male 2 Days Post-Op status post L5-S1 PLDF    -- Pain control  -- WBAT  -- DVT: SQH  -- Monitor HV drain output  -- PT/OT: cleared for home  -- Monitor neurovascular status    Korey Menard MD   Orthopaedic Surgery

## 2021-08-28 NOTE — NURSING NOTE
Requested transport for patient. Transporter discharged patient from the system before picking up patient. Admissions notified and repeat request placed.

## 2021-08-28 NOTE — PLAN OF CARE
Plan of Care Review  Plan of Care Reviewed With: patient  Progress: improving  Outcome Summary: Pt oob assist x 1 with rolling walker. Mild pain with ambulation managed with Tylenol. Conmplained of mild heartburn/indigestion overnight. Voiding spontaneously. Last 600cc with PVR 100cc. Hemovac compressed. Possible duscarge home today once medically cleared.

## 2021-08-28 NOTE — NURSING NOTE
Patient's hemovac will not stay compressed, tubing is clear no drainage noted in tube. Mepilex covering drain has moderate amount of bloody drainage. Paged orthopedics to make aware, will continue to monitor.

## 2021-09-13 RX ORDER — CLOPIDOGREL BISULFATE 75 MG/1
TABLET ORAL
Qty: 90 TABLET | Refills: 1 | Status: SHIPPED | OUTPATIENT
Start: 2021-09-13 | End: 2022-05-09

## 2021-09-24 ENCOUNTER — TRANSCRIBE ORDERS (OUTPATIENT)
Dept: SCHEDULING | Age: 81
End: 2021-09-24
Payer: MEDICARE

## 2021-09-24 DIAGNOSIS — Z98.1 ARTHRODESIS STATUS: Primary | ICD-10-CM

## 2021-09-24 DIAGNOSIS — M48.062 SPINAL STENOSIS, LUMBAR REGION WITH NEUROGENIC CLAUDICATION: ICD-10-CM

## 2021-09-24 DIAGNOSIS — M54.16 RADICULOPATHY, LUMBAR REGION: ICD-10-CM

## 2021-09-25 ENCOUNTER — HOSPITAL ENCOUNTER (OUTPATIENT)
Dept: RADIOLOGY | Facility: CLINIC | Age: 81
Discharge: HOME | End: 2021-09-25
Attending: ORTHOPAEDIC SURGERY
Payer: MEDICARE

## 2021-09-25 DIAGNOSIS — M48.062 SPINAL STENOSIS, LUMBAR REGION WITH NEUROGENIC CLAUDICATION: ICD-10-CM

## 2021-09-25 DIAGNOSIS — M54.16 RADICULOPATHY, LUMBAR REGION: ICD-10-CM

## 2021-09-25 DIAGNOSIS — Z98.1 ARTHRODESIS STATUS: ICD-10-CM

## 2021-10-06 ENCOUNTER — TRANSCRIBE ORDERS (OUTPATIENT)
Dept: SCHEDULING | Facility: REHABILITATION | Age: 81
End: 2021-10-06
Payer: MEDICARE

## 2021-10-06 DIAGNOSIS — S39.012A LUMBAR SPINE STRAIN: Primary | ICD-10-CM

## 2021-10-21 ENCOUNTER — HOSPITAL ENCOUNTER (OUTPATIENT)
Dept: PHYSICAL THERAPY | Facility: CLINIC | Age: 81
Setting detail: THERAPIES SERIES
Discharge: HOME | End: 2021-10-21
Attending: ORTHOPAEDIC SURGERY
Payer: MEDICARE

## 2021-10-21 DIAGNOSIS — S39.012D STRAIN OF LUMBAR REGION, SUBSEQUENT ENCOUNTER: ICD-10-CM

## 2021-10-21 DIAGNOSIS — S39.012A LUMBAR SPINE STRAIN: ICD-10-CM

## 2021-10-21 PROCEDURE — 97010 HOT OR COLD PACKS THERAPY: CPT | Mod: GP

## 2021-10-21 PROCEDURE — 97163 PT EVAL HIGH COMPLEX 45 MIN: CPT

## 2021-10-21 PROCEDURE — 97110 THERAPEUTIC EXERCISES: CPT | Mod: GP
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
## 2021-10-21 NOTE — OP PT TREATMENT LOG
DOS 8-26-21   IE 10-21-21   30 Day    60 Day    Precautions fall, lifting, cardiac and CVA hx   Treatment G/Y/N Current Session Time   Modalities Total Time for Session 8-22 Minutes   MHP    CP 8 min post tx to Lsp seated       Manual  Total Time for Session Not performed   STM/MFR/TPR Lsp STM, TFM along incision line   Instrument Assisted STM     Mobilizations    ROM/Flexibility manually A HS stretch, hip mobility       Therapeutic Exercise Total Time for Session 8-22 Minutes    Sets Reps Load Comment    STRETCH        supine sciatic glide y  3     supine SKC y  3     supine piriformis                standing hip flex at step        stanidng gastroc stretch  y  1  at wall                           STRENGTH        TAC         TAC hip add        TAC hip abd        bridging        TAC SLR                HR y  3     hip abd        step ups                Neuromuscular Re-Education Total Time for Session Not performed    Sets Reps Load Comment   standing balance act        circuit walking serp cones, hurdles, curb steps        rockerboard        NBOS/tandem floor/foam                  Gait Total Time for Session Not performed   with SPC    stair training    Therapeutic Activity Total Time for Session Not performed   Pt education        Group Total Time for Session Not performed

## 2021-11-03 ENCOUNTER — HOSPITAL ENCOUNTER (OUTPATIENT)
Dept: PHYSICAL THERAPY | Facility: CLINIC | Age: 81
Setting detail: THERAPIES SERIES
Discharge: HOME | End: 2021-11-03
Attending: ORTHOPAEDIC SURGERY
Payer: MEDICARE

## 2021-11-03 DIAGNOSIS — S39.012D STRAIN OF LUMBAR REGION, SUBSEQUENT ENCOUNTER: Primary | ICD-10-CM

## 2021-11-03 PROCEDURE — 97110 THERAPEUTIC EXERCISES: CPT | Mod: GP

## 2021-11-03 PROCEDURE — 97116 GAIT TRAINING THERAPY: CPT | Mod: GP

## 2021-11-03 NOTE — OP PT TREATMENT LOG
DOS 8-26-21   IE 10-21-21   30 Day    60 Day    Precautions fall, lifting, cardiac and CVA hx   Treatment G/Y/N Current Session Time   Modalities Total Time for Session Not performed   MHP    CP 8 min post tx to Lsp seated       Manual  Total Time for Session Not performed   STM/MFR/TPR Lsp STM, TFM along incision line   Instrument Assisted STM     Mobilizations    ROM/Flexibility manually A HS stretch, hip mobility       Therapeutic Exercise Total Time for Session 38-52 Minutes    Sets Reps Load Comment    pt ed     pt and wife ed: HEP per written materials, daily walking in home with RW every 1-2 hours, prep with sciatic nerve glides and TAC with STS; falls prevention; use of CP and contact MD re med rec for pain mgmt; verbalized understanding   STRETCH        supine sciatic glide y  5     supine SKC y  5     supine piriformis                standing hip flex at step        standing gastroc stretch  y  3  in parallel bars           seated sciatic glides exer y  10ea     seated pball rollouts y  x10     STRENGTH        TAC seated y       TAC hip add seated y       TAC with ball add hooklying y  10     gr pball HS curl y 2 10     gr ball LE supported TAC y  10     gr pball controlled LTR midROM y  10     TAC progression planned                HR y 2 10     hip abd        step ups                Neuromuscular Re-Education Total Time for Session Not performed    Sets Reps Load Comment   standing balance act        circuit walking serp cones, hurdles, curb steps        rockerboard        NBOS/tandem floor/foam                  Gait Total Time for Session 8-22 Minutes   with SPC in clinic 120 ft with close S and cues for upright posture and decr stride length   parallel bars for / back walking x5   Therapeutic Activity Total Time for Session Not performed   Pt education        Group Total Time for Session Not performed

## 2021-11-03 NOTE — PROGRESS NOTES
PT DAILY NOTE FOR OUTPATIENT THERAPY    Patient: Ko Shearer   MRN: [id number]  : 1940 81 y.o.  Referring Physician: Carlo Rodas MD  Date of Visit: 11/3/2021    Certification Dates: 10/21/21 through 21    Diagnosis:   1. Strain of lumbar region, subsequent encounter        Chief Complaints:       Precautions:   Precautions comments: per pt report no heavy lifting rec by MD; cardiac hx; CVA hx  Existing Precautions/Restrictions: lifting,fall     TODAY'S VISIT     History/Vitals/Pain/Encounter Info - 21 1312        Injury History/Precautions/Daily Required Info    Primary Therapist Mary Booker PT     Onset of Illness/Injury or Date of Surgery 21     Referring Physician Adrianna     Existing Precautions/Restrictions lifting;fall     Precautions comments per pt report no heavy lifting rec by MD; cardiac hx; CVA hx     Pertinent History of Current Functional Problem s/p lami and fusion 2021; limited transitional mpvement, gait with RW, pain and limited functional mobility for ADLs     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     Document Type daily treatment     Patient/Family/Caregiver Comments/Observations 80 yo M presents to OP PT s/p L5-S1 lami and fusion done 21 by Dr Rodas at [facility]. Arrives using RW for gait and noted difficulty with STS transfers. Reports noted incr pain in L lateral hip area started in late Spring 2020 and had surgery Aug 2021.     Start Time 1300     Stop Time 1400     Time Calculation (min) 60 min     Patient reported fall since last visit No        Pain Assessment    Currently in pain Yes     Preferred Pain Scale number (Numeric Rating Pain Scale)     Pain: Body location Back     Pain Rating (0-10): Pre Activity 4     Pain Rating (0-10): Activity 10   with intermittent pain shooting down anterior thigh to kne       Pain Intervention    Intervention  TE     Post Intervention Comments monitored during session        Pre Activity Vital Signs    Pulse 78     SpO2 100 %      "Oxygen Therapy None (Room air)     /68     BP Location Left upper arm     BP Method Manual     Patient Position Sitting                Daily Treatment Assessment and Plan - 11/03/21 1312        Daily Treatment Assessment and Plan    Progress toward goals Progressing     Daily Outcome Summary pt arrives with wife, reports incr pain to 10/10 intermittently LB and down LLE to knee region; pt has just returned home from closing down the sailboat, where he was \"doing a lot\" and then driving back from [place]. Discussed pain mgmt with use of ice and contact MD re any medication recommendation. Initiated with seated activity, worked on transitional movements and sx mgmt, supine required head elevation on wedge for decr pain to work on ther exer as noted. + SLR bilat with LLE 40' RLE 30', after sciatic flossing and gentle stretching demo improving transitional movement. Gait training with SPC in clinic. Pt and wife ed for cont HEP, daily walking with RW in home every 1-2 hours;  use of ice prn for pain mgmt.     Plan and Recommendations Cont POC with progression as able                     OBJECTIVE DATA TAKEN TODAY:    None takensee note    Today's Treatment:    DOS 8-26-21   IE 10-21-21   30 Day    60 Day    Precautions fall, lifting, cardiac and CVA hx   Treatment G/Y/N Current Session Time   Modalities Total Time for Session Not performed   MHP    CP 8 min post tx to Lsp seated       Manual  Total Time for Session Not performed   STM/MFR/TPR Lsp STM, TFM along incision line   Instrument Assisted STM     Mobilizations    ROM/Flexibility manually A HS stretch, hip mobility       Therapeutic Exercise Total Time for Session 38-52 Minutes    Sets Reps Load Comment    pt ed     pt and wife ed: HEP per written materials, daily walking in home with RW every 1-2 hours, prep with sciatic nerve glides and TAC with STS; falls prevention; use of CP and contact MD re med rec for pain mgmt; verbalized understanding   STRETCH      "   supine sciatic glide y  5     supine SKC y  5     supine piriformis                standing hip flex at step        standing gastroc stretch  y  3  in parallel bars           seated sciatic glides exer y  10ea     seated pball rollouts y  x10     STRENGTH        TAC seated y       TAC hip add seated y       TAC with ball add hooklying y  10     gr pball HS curl y 2 10     gr ball LE supported TAC y  10     gr pball controlled LTR midROM y  10     TAC progression planned                HR y 2 10     hip abd        step ups                Neuromuscular Re-Education Total Time for Session Not performed    Sets Reps Load Comment   standing balance act        circuit walking serp cones, hurdles, curb steps        rockerboard        NBOS/tandem floor/foam                  Gait Total Time for Session 8-22 Minutes   with SPC in clinic 120 ft with close S and cues for upright posture and decr stride length   parallel bars for / back walking x5   Therapeutic Activity Total Time for Session Not performed   Pt education        Group Total Time for Session Not performed

## 2021-11-05 ENCOUNTER — HOSPITAL ENCOUNTER (OUTPATIENT)
Dept: PHYSICAL THERAPY | Facility: CLINIC | Age: 81
Setting detail: THERAPIES SERIES
Discharge: HOME | End: 2021-11-05
Attending: ORTHOPAEDIC SURGERY
Payer: MEDICARE

## 2021-11-05 DIAGNOSIS — S39.012D STRAIN OF LUMBAR REGION, SUBSEQUENT ENCOUNTER: Primary | ICD-10-CM

## 2021-11-05 PROCEDURE — 97110 THERAPEUTIC EXERCISES: CPT | Mod: GP

## 2021-11-05 PROCEDURE — 97116 GAIT TRAINING THERAPY: CPT | Mod: GP

## 2021-11-05 RX ORDER — BACLOFEN 10 MG/1
10 TABLET ORAL 3 TIMES DAILY
COMMUNITY
End: 2022-07-22

## 2021-11-05 NOTE — OP PT TREATMENT LOG
DOS 8-26-21   IE 10-21-21   30 Day    60 Day    Precautions fall, lifting, cardiac and CVA hx   Treatment G/Y/N Current Session Time   Modalities Total Time for Session Not performed   MHP    CP 8 min post tx to Lsp seated       Manual  Total Time for Session Not performed   STM/MFR/TPR Lsp STM, TFM along incision line   Instrument Assisted STM     Mobilizations    ROM/Flexibility manually A HS stretch, hip mobility       Therapeutic Exercise Total Time for Session 38-52 Minutes    Sets Reps Load Comment    pt ed     pt and wife ed: HEP per written materials, daily walking in home with RW every 1-2 hours, prep with sciatic nerve glides and TAC with STS; falls prevention; use of CP and contact MD re med rec for pain mgmt; verbalized understanding   STRETCH        supine sciatic glide y  5     supine SKC y  5     supine piriformis                standing hip flex at step y  3     standing gastroc stretch  y  3  in parallel bars           seated sciatic glides exer y  10ea     seated pball rollouts y  x10     STRENGTH        TAC seated        TAC hip add seated        TAC with ball add hooklying y  10     gr pball HS curl y 2 10     LE supported TAC y  10     controlled LTR midROM y  10     TAC SAQ y 2 10             HR y 2 10  parallel bars   hip abd        step ups                RB y    6 min L2   Neuromuscular Re-Education Total Time for Session Not performed    Sets Reps Load Comment   standing balance act        circuit walking serp cones, hurdles, curb steps        rockerboard        NBOS/tandem floor/foam                  Gait Total Time for Session 8-22 Minutes   with SPC in clinic 120 ft with  RW close S and cues for upright posture and decr stride length   parallel bars for / back walking x6   Therapeutic Activity Total Time for Session Not performed   Pt education        Group Total Time for Session Not performed           
Hyponatremia

## 2021-11-05 NOTE — PROGRESS NOTES
PT DAILY NOTE FOR OUTPATIENT THERAPY    Patient: Ko Shearer   MRN: [id number]  : 1940 81 y.o.  Referring Physician: Carlo Rodas MD  Date of Visit: 2021    Certification Dates: 10/21/21 through 21    Diagnosis:   1. Strain of lumbar region, subsequent encounter        Chief Complaints:       Precautions:   Precautions comments: per pt report no heavy lifting rec by MD; cardiac hx; CVA hx  Existing Precautions/Restrictions: lifting,fall     TODAY'S VISIT     History/Vitals/Pain/Encounter Info - 21 1011        Injury History/Precautions/Daily Required Info    Primary Therapist Mary Booker PT     Onset of Illness/Injury or Date of Surgery 21     Referring Physician Adrianna     Existing Precautions/Restrictions lifting;fall     Precautions comments per pt report no heavy lifting rec by MD; cardiac hx; CVA hx     Pertinent History of Current Functional Problem s/p lami and fusion 2021; limited transitional mpvement, gait with RW, pain and limited functional mobility for ADLs     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     Document Type daily treatment     Patient/Family/Caregiver Comments/Observations 82 yo M presents to OP PT s/p L5-S1 lami and fusion done 21 by Dr Rodas at [facility]. Arrives using RW for gait and noted difficulty with STS transfers. Reports noted incr pain in L lateral hip area started in late Spring 2020 and had surgery Aug 2021.     Start Time 1007     Stop Time 1100     Time Calculation (min) 53 min     Patient reported fall since last visit No        Pain Assessment    Currently in pain Yes     Preferred Pain Scale number (Numeric Rating Pain Scale)     Pain Side/Orientation bilateral     Pain: Body location Back;Leg     Pain Rating (0-10): Pre Activity 0     Pain Rating (0-10): Activity 8   intermittent       Pain Intervention    Intervention  TE     Post Intervention Comments monitored during session                Daily Treatment Assessment and Plan - 21 1011         Daily Treatment Assessment and Plan    Progress toward goals Progressing     Daily Outcome Summary see below     pt arrives using RW, wife present  reports started taking Baclofen yesterday, slept a little better  no pain at rest, intermittent pain down posterior LEs 8/10 at times  pain with transition sit-supine  progressed exercises and gait activities as noted without radic sx   reports no incr pain at end of session  Patient and wife as been instructed to continue with HEP; freq walks in home with AD every hour break from sitting; pt verbalized understanding.  Pt will benefit from continued skilled Physical Therapy visits to work toward established treatment goals. Focus on sx mgmt, progressive strengthening, gt/balance act as indicated to improve functional I with mobility.    Plan and Recommendations Cont POC with progression as able                     OBJECTIVE DATA TAKEN TODAY:    None taken    Today's Treatment:    DOS 8-26-21   IE 10-21-21   30 Day    60 Day    Precautions fall, lifting, cardiac and CVA hx   Treatment G/Y/N Current Session Time   Modalities Total Time for Session Not performed   MHP    CP 8 min post tx to Lsp seated       Manual  Total Time for Session Not performed   STM/MFR/TPR Lsp STM, TFM along incision line   Instrument Assisted STM     Mobilizations    ROM/Flexibility manually A HS stretch, hip mobility       Therapeutic Exercise Total Time for Session 38-52 Minutes    Sets Reps Load Comment    pt ed     pt and wife ed: HEP per written materials, daily walking in home with RW every 1-2 hours, prep with sciatic nerve glides and TAC with STS; falls prevention; use of CP and contact MD brooke med rec for pain mgmt; verbalized understanding   STRETCH        supine sciatic glide y  5     supine SKC y  5     supine piriformis                standing hip flex at step y  3     standing gastroc stretch  y  3  in parallel bars           seated sciatic glides exer y  10ea     seated pball  rollouts y  x10     STRENGTH        TAC seated        TAC hip add seated        TAC with ball add hooklying y  10     gr pball HS curl y 2 10     LE supported TAC y  10     controlled LTR midROM y  10     TAC SAQ y 2 10             HR y 2 10  parallel bars   hip abd        step ups                RB y    6 min L2   Neuromuscular Re-Education Total Time for Session Not performed    Sets Reps Load Comment   standing balance act        circuit walking serp cones, hurdles, curb steps        rockerboard        NBOS/tandem floor/foam                  Gait Total Time for Session 8-22 Minutes   with SPC in clinic 120 ft with  RW close S and cues for upright posture and decr stride length   parallel bars for / back walking x6   Therapeutic Activity Total Time for Session Not performed   Pt education        Group Total Time for Session Not performed

## 2021-11-09 ENCOUNTER — HOSPITAL ENCOUNTER (OUTPATIENT)
Dept: PHYSICAL THERAPY | Facility: CLINIC | Age: 81
Setting detail: THERAPIES SERIES
Discharge: HOME | End: 2021-11-09
Attending: ORTHOPAEDIC SURGERY
Payer: MEDICARE

## 2021-11-09 DIAGNOSIS — S39.012D STRAIN OF LUMBAR REGION, SUBSEQUENT ENCOUNTER: Primary | ICD-10-CM

## 2021-11-09 PROCEDURE — 97110 THERAPEUTIC EXERCISES: CPT | Mod: GP

## 2021-11-09 PROCEDURE — 97116 GAIT TRAINING THERAPY: CPT | Mod: GP

## 2021-11-09 NOTE — OP PT TREATMENT LOG
DOS 8-26-21   IE 10-21-21   30 Day    60 Day    Precautions fall, lifting, cardiac and CVA hx   Treatment G/Y/N Current Session Time   Modalities Total Time for Session Not performed   MHP    CP 8 min post tx to Lsp seated- declined       Manual  Total Time for Session Not performed   STM/MFR/TPR Lsp STM, TFM along incision line   Instrument Assisted STM     Mobilizations    ROM/Flexibility manually A HS stretch, hip mobility       Therapeutic Exercise Total Time for Session 38-52 Minutes    Sets Reps Load Comment    pt ed     pt ed: HEP per written materials, daily walking in home with SPC /RW every 1-2 hours, prep with sciatic nerve glides; verbalized understanding   STRETCH        supine sciatic glide y  5     supine SKC y  5     supine piriformis                standing hip flex at step y 2 5     standing gastroc stretch  y  3             seated sciatic glides exer y  10ea     seated pball rollouts y  x105     STRENGTH        TAC seated        TAC hip add seated        TAC with ball add hooklying y  10     gr pball HS curl y 2 10     controlled LTR midROM y  10     TAC SAQ  2 10             HR y 2 10  parallel bars   hip abd        step ups                RB y    8.5  min L2   Neuromuscular Re-Education Total Time for Session Not performed    Sets Reps Load Comment   standing balance act        circuit walking serp cones, hurdles, curb steps        rockerboard        NBOS/tandem floor/foam                  Gait Total Time for Session 8-22 Minutes   with SPC in clinic 120 ft with SPC distant S and cues for upright posture and decr stride length   parallel bars for / back walking x6   Therapeutic Activity Total Time for Session Not performed   Pt education        Group Total Time for Session Not performed

## 2021-11-09 NOTE — PROGRESS NOTES
PT DAILY NOTE FOR OUTPATIENT THERAPY    Patient: Ko Shearer   MRN: [id number]  : 1940 81 y.o.  Referring Physician: Carlo Rodas MD  Date of Visit: 2021    Certification Dates: 10/21/21 through 21    Diagnosis:   1. Strain of lumbar region, subsequent encounter        Chief Complaints:       Precautions:   Precautions comments: per pt report no heavy lifting rec by MD; cardiac hx; CVA hx  Existing Precautions/Restrictions: lifting,fall     TODAY'S VISIT     History/Vitals/Pain/Encounter Info - 21 1422        Injury History/Precautions/Daily Required Info    Primary Therapist Mary Booker PT     Onset of Illness/Injury or Date of Surgery 21     Referring Physician Adrianna     Existing Precautions/Restrictions lifting;fall     Precautions comments per pt report no heavy lifting rec by MD; cardiac hx; CVA hx     Pertinent History of Current Functional Problem s/p lami and fusion 2021; limited transitional mpvement, gait with RW, pain and limited functional mobility for ADLs     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     Document Type daily treatment     Patient/Family/Caregiver Comments/Observations 80 yo M presents to OP PT s/p L5-S1 lami and fusion done 21 by Dr Rodas at [facility]. Arrives using RW for gait and noted difficulty with STS transfers. Reports noted incr pain in L lateral hip area started in late Spring 2020 and had surgery Aug 2021.     Start Time 1415     Stop Time 1513     Time Calculation (min) 58 min     Patient reported fall since last visit No        Pain Assessment    Currently in pain Yes     Preferred Pain Scale number (Numeric Rating Pain Scale)     Pain Side/Orientation bilateral     Pain: Body location Back;Leg     Pain Rating (0-10): Pre Activity 0     Pain Rating (0-10): Activity 8   intermittent pain in LB and LE       Pain Intervention    Intervention  TE     Post Intervention Comments monitored during session        Pre Activity Vital Signs    Pulse 78     SpO2  "96 %     /68     BP Location Left upper arm     BP Method Manual     Patient Position Sitting                Daily Treatment Assessment and Plan - 11/09/21 1422        Daily Treatment Assessment and Plan    Progress toward goals Progressing     Daily Outcome Summary see below   pt reports he drove himself today, walking with SPC  pt reports intermittent pain in LB and \"grabbing pain in leg\" with some movements  reports he is upset as his sister just found out she has cancer and is not doing well    initiated with seated warmup  reports no incr LE pain with seated sciatic glides this session, demo judder with sit to stand transition with guarded positional changes.  pt performed ther exer as noted with cues for proper pacing and positioning.  Pt demo improved tolerance for activity with decr rest breaks needed between activities this session.    Pt is guarded with transitional movements, denies any bursts of radic pain sx this session; demo improved gait with SPC, continues with cues for a slower mary and shortened stride length to prevent sudden onset of pain due to overstretch.   Pt ed to cont HEP and daily freq short bout walking within home; verbalized understanding.  Pt will benefit from continued skilled Physical Therapy visits to work toward established treatment goals. Focus on progressive functional mobility training, strength/stabilization program to improve safe and I  functionnal mobility with ADLs.    Plan and Recommendations Cont POC with progression as able                     OBJECTIVE DATA TAKEN TODAY:    None taken    Today's Treatment:    DOS 8-26-21   IE 10-21-21   30 Day    60 Day    Precautions fall, lifting, cardiac and CVA hx   Treatment G/Y/N Current Session Time   Modalities Total Time for Session Not performed   MHP    CP 8 min post tx to Lsp seated- declined       Manual  Total Time for Session Not performed   STM/MFR/TPR Lsp STM, TFM along incision line   Instrument Assisted STM  "    Mobilizations    ROM/Flexibility manually A HS stretch, hip mobility       Therapeutic Exercise Total Time for Session 38-52 Minutes    Sets Reps Load Comment    pt ed     pt ed: HEP per written materials, daily walking in home with SPC /RW every 1-2 hours, prep with sciatic nerve glides; verbalized understanding   STRETCH        supine sciatic glide y  5     supine SKC y  5     supine piriformis                standing hip flex at step y 2 5     standing gastroc stretch  y  3             seated sciatic glides exer y  10ea     seated pball rollouts y  x105     STRENGTH        TAC seated        TAC hip add seated        TAC with ball add hooklying y  10     gr pball HS curl y 2 10     controlled LTR midROM y  10     TAC SAQ  2 10             HR y 2 10  parallel bars   hip abd        step ups                RB y    8.5  min L2   Neuromuscular Re-Education Total Time for Session Not performed    Sets Reps Load Comment   standing balance act        circuit walking serp cones, hurdles, curb steps        rockerboard        NBOS/tandem floor/foam                  Gait Total Time for Session 8-22 Minutes   with SPC in clinic 120 ft with SPC distant S and cues for upright posture and decr stride length   parallel bars for / back walking x6   Therapeutic Activity Total Time for Session Not performed   Pt education        Group Total Time for Session Not performed

## 2021-11-12 ENCOUNTER — HOSPITAL ENCOUNTER (OUTPATIENT)
Dept: PHYSICAL THERAPY | Facility: CLINIC | Age: 81
Setting detail: THERAPIES SERIES
Discharge: HOME | End: 2021-11-12
Attending: ORTHOPAEDIC SURGERY
Payer: MEDICARE

## 2021-11-12 DIAGNOSIS — S39.012D STRAIN OF LUMBAR REGION, SUBSEQUENT ENCOUNTER: Primary | ICD-10-CM

## 2021-11-12 PROCEDURE — 97116 GAIT TRAINING THERAPY: CPT | Mod: GP

## 2021-11-12 PROCEDURE — 97110 THERAPEUTIC EXERCISES: CPT | Mod: GP

## 2021-11-12 NOTE — PROGRESS NOTES
PT DAILY NOTE FOR OUTPATIENT THERAPY    Patient: Ko Shearer   MRN: [id number]  : 1940 81 y.o.  Referring Physician: Carlo Rodas MD  Date of Visit: 2021    Certification Dates: 10/21/21 through 21    Diagnosis:   1. Strain of lumbar region, subsequent encounter        Chief Complaints:       Precautions:   Precautions comments: per pt report no heavy lifting rec by MD; cardiac hx; CVA hx  Existing Precautions/Restrictions: lifting,fall     TODAY'S VISIT     History/Vitals/Pain/Encounter Info - 21 1011        Injury History/Precautions/Daily Required Info    Primary Therapist Mary Booker PT     Onset of Illness/Injury or Date of Surgery 21     Referring Physician Adrianna     Existing Precautions/Restrictions lifting;fall     Precautions comments per pt report no heavy lifting rec by MD; cardiac hx; CVA hx     Pertinent History of Current Functional Problem s/p lami and fusion 2021; limited transitional mpvement, gait with RW, pain and limited functional mobility for ADLs     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     Document Type daily treatment     Patient/Family/Caregiver Comments/Observations 80 yo M presents to OP PT s/p L5-S1 lami and fusion done 21 by Dr Rodas at [facility]. Arrives using RW for gait and noted difficulty with STS transfers. Reports noted incr pain in L lateral hip area started in late Spring 2020 and had surgery Aug 2021.     Start Time 1104     Stop Time 1100     Time Calculation (min) 1436 min     Patient reported fall since last visit No        Pain Assessment    Currently in pain Yes     Preferred Pain Scale number (Numeric Rating Pain Scale)     Pain Side/Orientation bilateral     Pain: Body location Back     Pain Rating (0-10): Pre Activity 0     Pain Rating (0-10): Activity 5   grabbing me 2x this am taking my pills       Pain Intervention    Intervention  TE     Post Intervention Comments monitored        Pre Activity Vital Signs    /60     BP Location  Left upper arm     BP Method Manual     Patient Position Sitting                Daily Treatment Assessment and Plan - 21 1011        Daily Treatment Assessment and Plan    Progress toward goals Progressing     Daily Outcome Summary pt arrives with wofe and using SPC for amb, notes 2x grabbing pain in LB and LE while getting breakfast and am pills. Pt reports he had a phone call early this am that his brother  yesterday. initiated with seated then mat activities as noted. Progressed TAC exer without incr pain /sx. Pt with some LLE pain with transition to sup-sit and then sit-stand with initial walkw ith SPC, after RB tried walking with RW with improved pain levels. Discussed with pt who stated he continues use of RW in home, carleen at night for walking to BR, rec cont for safety. Pt ed to cont HEP as able, aware pt may have obligations and need to change upcoming schedule due to passing of his brother and family obligations.     Plan and Recommendations Cont POC with progression as able                     OBJECTIVE DATA TAKEN TODAY:    None taken    Today's Treatment:    DOS 21   IE 10-21-21   30 Day    60 Day    Precautions fall, lifting, cardiac and CVA hx   Treatment G/Y/N Current Session Time   Modalities Total Time for Session Not performed   MHP    CP 8 min post tx to Lsp seated- declined       Manual  Total Time for Session Not performed   STM/MFR/TPR Lsp STM, TFM along incision line   Instrument Assisted STM     Mobilizations    ROM/Flexibility manually A HS stretch, hip mobility       Therapeutic Exercise Total Time for Session 38-52 Minutes    Sets Reps Load Comment    pt ed     pt ed: HEP per written materials, daily walking in home with SPC /RW every 1-2 hours, prep with sciatic nerve glides; verbalized understanding   STRETCH        supine sciatic glide y  5     supine SKC y  5     supine piriformis                standing hip flex at step y 2 5     standing gastroc stretch  y  3              seated sciatic glides exer y  10ea     seated pball rollouts y  x10     STRENGTH        TAC seated        TAC hip abd tband y 2oo 10 PTB    TAC with ball add hooklying y  10     gr pball HS curl y 2 10     controlled LTR midROM y  10     TAC SAQ  2 10             HR y 2 10  parallel bars   hip abd        step ups                RB y    10 min L2   Neuromuscular Re-Education Total Time for Session Not performed    Sets Reps Load Comment   standing balance act        circuit walking serp cones, hurdles, curb steps        rockerboard        NBOS/tandem floor/foam                  Gait Total Time for Session 8-22 Minutes   with SPC in clinic 120 ft with SPC distant S and cues for upright posture and decr stride length, 120ft with RW   parallel bars for / back walking x6   Therapeutic Activity Total Time for Session Not performed   Pt education        Group Total Time for Session Not performed

## 2021-11-12 NOTE — OP PT TREATMENT LOG
DOS 8-26-21   IE 10-21-21   30 Day    60 Day    Precautions fall, lifting, cardiac and CVA hx   Treatment G/Y/N Current Session Time   Modalities Total Time for Session Not performed   MHP    CP 8 min post tx to Lsp seated- declined       Manual  Total Time for Session Not performed   STM/MFR/TPR Lsp STM, TFM along incision line   Instrument Assisted STM     Mobilizations    ROM/Flexibility manually A HS stretch, hip mobility       Therapeutic Exercise Total Time for Session 38-52 Minutes    Sets Reps Load Comment    pt ed     pt ed: HEP per written materials, daily walking in home with SPC /RW every 1-2 hours, prep with sciatic nerve glides; verbalized understanding   STRETCH        supine sciatic glide y  5     supine SKC y  5     supine piriformis                standing hip flex at step y 2 5     standing gastroc stretch  y  3             seated sciatic glides exer y  10ea     seated pball rollouts y  x10     STRENGTH        TAC seated        TAC hip abd tband y 2oo 10 PTB    TAC with ball add hooklying y  10     gr pball HS curl y 2 10     controlled LTR midROM y  10     TAC SAQ  2 10             HR y 2 10  parallel bars   hip abd        step ups                RB y    10 min L2   Neuromuscular Re-Education Total Time for Session Not performed    Sets Reps Load Comment   standing balance act        circuit walking serp cones, hurdles, curb steps        rockerboard        NBOS/tandem floor/foam                  Gait Total Time for Session 8-22 Minutes   with SPC in clinic 120 ft with SPC distant S and cues for upright posture and decr stride length, 120ft with RW   parallel bars for / back walking x6   Therapeutic Activity Total Time for Session Not performed   Pt education        Group Total Time for Session Not performed

## 2021-11-15 ENCOUNTER — HOSPITAL ENCOUNTER (OUTPATIENT)
Dept: PHYSICAL THERAPY | Facility: CLINIC | Age: 81
Setting detail: THERAPIES SERIES
Discharge: HOME | End: 2021-11-15
Attending: ORTHOPAEDIC SURGERY
Payer: MEDICARE

## 2021-11-15 DIAGNOSIS — S39.012D STRAIN OF LUMBAR REGION, SUBSEQUENT ENCOUNTER: Primary | ICD-10-CM

## 2021-11-15 PROCEDURE — 97010 HOT OR COLD PACKS THERAPY: CPT | Mod: GP,CQ

## 2021-11-15 PROCEDURE — 97110 THERAPEUTIC EXERCISES: CPT | Mod: GP,CQ

## 2021-11-15 NOTE — PROGRESS NOTES
PT DAILY NOTE FOR OUTPATIENT THERAPY    Patient: Ko Shearer   MRN: [id number]  : 1940 81 y.o.  Referring Physician: Carlo Rodas MD  Date of Visit: 11/15/2021    Certification Dates: 10/21/21 through 21    Diagnosis:   1. Strain of lumbar region, subsequent encounter        Chief Complaints:       Precautions:   Precautions comments: per pt report no heavy lifting rec by MD, cardiac Hx, CVA Hx  Existing Precautions/Restrictions: lifting,fall     TODAY'S VISIT     History/Vitals/Pain/Encounter Info - 11/15/21 1317        Injury History/Precautions/Daily Required Info    Primary Therapist Mary Booker PT     Onset of Illness/Injury or Date of Surgery 21     Referring Physician Adrianna     Existing Precautions/Restrictions lifting;fall     Precautions comments per pt report no heavy lifting rec by MD, cardiac Hx, CVA Hx     Pertinent History of Current Functional Problem s/p lami and fusion 2021; limited transitional mpvement, gait with RW, pain and limited functional mobility for ADLs     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     Document Type daily treatment     Patient/Family/Caregiver Comments/Observations Pt reports running late today- finding out his sister is in the hospital for lung Ca/metastatic Ca and recently his brother passed away. C/O of his hip occassionally catching. Denies falls, L/B feels stiff today.     Start Time 1310     Stop Time 1405     Time Calculation (min) 55 min     Patient reported fall since last visit No        Pain Assessment    Currently in pain No/Denies     Preferred Pain Scale word (verbal rating pain scale)   c/o of stiffness to L/B and hip    Pain Side/Orientation bilateral;left     Pain: Body location Back;Hip     Pain Rating (0-10): Activity --   c/o of hip grabbing him with transitional movements    Pain Onset/Duration with STS        Pain Intervention    Intervention  MHP, Carol     Post Intervention Comments see assessment                Daily Treatment  "Assessment and Plan - 11/15/21 1317        Daily Treatment Assessment and Plan    Progress toward goals Progressing     Daily Outcome Summary Pt amb into PT with SPC, no RW, as discussed with PT last visit. Pt with MHP to L/B sitting followed by sit LE nerve glides and rec bike and limited therex due to time constraints. Pt advised to complete nerve glides and hip hinge to decrease hip pain sit to stand. Cues for TA with mat Carol for L/B stability. No other c/o offered post session.     Plan and Recommendations Cont per PT. Focus on standing hip strength as able.                     OBJECTIVE DATA TAKEN TODAY:    None taken    Today's Treatment:    DOS 8-26-21   IE 10-21-21   30 Day     60 Day     Precautions fall, lifting, cardiac and CVA hx   Treatment G/Y/N Current Session Time   Modalities Total Time for Session 8-22 minutes   MHP     CP pre tx to Lsp seated- yes         Manual  Total Time for Session Not performed   STM/MFR/TPR Lsp STM, TFM along incision line   Instrument Assisted STM      Mobilizations     ROM/Flexibility manually A HS stretch, hip mobility         Therapeutic Exercise Total Time for Session 38-52 Minutes    Sets Reps Load Comment    pt ed         pt ed: HEP per written materials, daily walking in home with SPC /RW every 1-2 hours, prep with sciatic nerve glides; verbalized understanding   STRETCH             supine sciatic glide y   5       supine SKC y  20\" 3       supine piriformis                           standing hip flex at step nv 2 5       standing gastroc stretch  nv   3                     seated sciatic glides exer y   10ea    post MHP   seated pball rollouts y  10\" x10       STRENGTH             TAC seated             TAC hip abd tband y 2 10 PTB  5\"H   TAC with ball add hooklying y   10    5\"H   gr pball HS curl nv 2 10       controlled LTR midROM    10       TAC SAQ   2 10                     HR nv 2 10   parallel bars   hip abd  nv           step ups                         "   RB y       8 min L2   Neuromuscular Re-Education Total Time for Session Not performed    Sets Reps Load Comment   standing balance act             circuit walking serp cones, hurdles, curb steps             rockerboard             NBOS/tandem floor/foam                             Gait Total Time for Session Not peerformed   with SPC in clinic 120 ft with SPC distant S and cues for upright posture and decr stride length, 120ft with RW   parallel bars for / back walking x6   Therapeutic Activity Total Time for Session Not performed   Pt education           Group Total Time for Session Not performed

## 2021-11-15 NOTE — OP PT TREATMENT LOG
"DOS 8-26-21   IE 10-21-21   30 Day     60 Day     Precautions fall, lifting, cardiac and CVA hx   Treatment G/Y/N Current Session Time   Modalities Total Time for Session 8-22 minutes   MHP     CP pre tx to Lsp seated- yes         Manual  Total Time for Session Not performed   STM/MFR/TPR Lsp STM, TFM along incision line   Instrument Assisted STM      Mobilizations     ROM/Flexibility manually A HS stretch, hip mobility         Therapeutic Exercise Total Time for Session 38-52 Minutes    Sets Reps Load Comment    pt ed         pt ed: HEP per written materials, daily walking in home with SPC /RW every 1-2 hours, prep with sciatic nerve glides; verbalized understanding   STRETCH             supine sciatic glide y   5       supine SKC y  20\" 3       supine piriformis                           standing hip flex at step nv 2 5       standing gastroc stretch  nv   3                     seated sciatic glides exer y   10ea    post MHP   seated pball rollouts y  10\" x10       STRENGTH             TAC seated             TAC hip abd tband y 2 10 PTB  5\"H   TAC with ball add hooklying y   10    5\"H   gr pball HS curl nv 2 10       controlled LTR midROM    10       TAC SAQ   2 10                     HR nv 2 10   parallel bars   hip abd  nv           step ups                           RB y       8 min L2   Neuromuscular Re-Education Total Time for Session Not performed    Sets Reps Load Comment   standing balance act             circuit walking serp cones, hurdles, curb steps             rockerboard             NBOS/tandem floor/foam                             Gait Total Time for Session Not peerformed   with SPC in clinic 120 ft with SPC distant S and cues for upright posture and decr stride length, 120ft with RW   parallel bars for / back walking x6   Therapeutic Activity Total Time for Session Not performed   Pt education           Group Total Time for Session Not performed        "

## 2021-11-17 ENCOUNTER — HOSPITAL ENCOUNTER (OUTPATIENT)
Dept: PHYSICAL THERAPY | Facility: CLINIC | Age: 81
Setting detail: THERAPIES SERIES
Discharge: HOME | End: 2021-11-17
Attending: ORTHOPAEDIC SURGERY
Payer: MEDICARE

## 2021-11-17 DIAGNOSIS — S39.012D STRAIN OF LUMBAR REGION, SUBSEQUENT ENCOUNTER: Primary | ICD-10-CM

## 2021-11-17 PROCEDURE — 97110 THERAPEUTIC EXERCISES: CPT | Mod: GP

## 2021-11-17 PROCEDURE — 97010 HOT OR COLD PACKS THERAPY: CPT | Mod: GP

## 2021-11-17 NOTE — PROGRESS NOTES
PT DAILY NOTE FOR OUTPATIENT THERAPY    Patient: Ko Shearer   MRN: [id number]  : 1940 81 y.o.  Referring Physician: Carlo Rodas MD  Date of Visit: 2021    Certification Dates: 10/21/21 through 21    Diagnosis:   1. Strain of lumbar region, subsequent encounter        Chief Complaints:       Precautions:   Precautions comments: per pt report no heavy lifting rec by MD, cardiac Hx, CVA Hx  Existing Precautions/Restrictions: lifting,fall     TODAY'S VISIT     History/Vitals/Pain/Encounter Info - 21 1027        Injury History/Precautions/Daily Required Info    Primary Therapist Mary Booker PT     Onset of Illness/Injury or Date of Surgery 21     Referring Physician Adrianna     Existing Precautions/Restrictions lifting;fall     Precautions comments per pt report no heavy lifting rec by MD, cardiac Hx, CVA Hx     Pertinent History of Current Functional Problem s/p lami and fusion 2021; limited transitional mpvement, gait with RW, pain and limited functional mobility for ADLs     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     Document Type daily treatment     Patient/Family/Caregiver Comments/Observations Pt reports running late today setting up arrangements for his brother's  services. Rates pain 5/10 at arrival. Says his hip isn't catching as much.     Start Time 1020     Stop Time 1111     Time Calculation (min) 51 min     Patient reported fall since last visit No        Pain Assessment    Currently in pain Yes     Preferred Pain Scale number (Numeric Rating Pain Scale)     Pain Side/Orientation bilateral;left     Pain: Body location Back;Hip     Pain Rating (0-10): Pre Activity 5     Pain Onset/Duration with STS        Pain Intervention    Intervention  MHP, Carol     Post Intervention Comments see assessment                Daily Treatment Assessment and Plan - 21 1027        Daily Treatment Assessment and Plan    Progress toward goals Progressing     Daily Outcome Summary see below  "  pt arrives late, had phone call about brother's  service on way out to clinic this am  pt reports ongoing pain in LB 5/10 this am, notes less catching with transitional movements  initiated with MHP LB seated, seated exer as noted  pt performed ther exer with cues for pacing and positioning  denies any incr LBP with activity performed  educated pt to cont HEP and progressive daily walking in home, pt reports he has less reliance on RW in home for walking, using SPC  Pt will benefit from continued skilled Physical Therapy visits to work toward established treatment goals. Focus on sx mgmt, progressive strength/stabilization for gait and functional mobility.    Plan and Recommendations cont POC with progressive strengthening                     OBJECTIVE DATA TAKEN TODAY:    None taken    Today's Treatment:    DOS 21   IE 10-21-21   30 Day     60 Day     Precautions fall, lifting, cardiac and CVA hx   Treatment G/Y/N Current Session Time   Modalities Total Time for Session 8-22 minutes   MHP     CP pre tx to Lsp seated- yes         Manual  Total Time for Session Not performed   STM/MFR/TPR Lsp STM, TFM along incision line   Instrument Assisted STM      Mobilizations     ROM/Flexibility manually A HS stretch, hip mobility         Therapeutic Exercise Total Time for Session 38-52 Minutes    Sets Reps Load Comment    pt ed         pt ed: HEP per written materials, daily walking in home with SPC /RW every 1-2 hours, prep with sciatic nerve glides; verbalized understanding   STRETCH             supine sciatic glide y   5       supine SKC y  20\" 3       supine piriformis                           standing hip flex at step yes 2 5       standing gastroc stretch  yes   3                     seated sciatic glides exer y   10ea    post MHP   seated pball rollouts y  10\" x10       STRENGTH             TAC seated             TAC hip abd tband  2 10 PTB  5\"H   TAC with ball add hooklying    10    5\"H   gr pball HS " curl yes 2 10       controlled LTR midROM  yes   10       TAC SAQ   2 10                     HR yes 2 10   parallel bars   hip abd  nv           STS  yes  1  10    sitting on foam no UE A                 RB y       10 min L2   Neuromuscular Re-Education Total Time for Session Not performed    Sets Reps Load Comment   standing balance act             circuit walking serp cones, hurdles, curb steps             rockerboard             NBOS/tandem floor/foam                             Gait Total Time for Session Not performed   with SPC in clinic 120 ft with SPC distant S and cues for upright posture and decr stride length, 120ft with RW   parallel bars for / back walking x6   Therapeutic Activity Total Time for Session Not performed   Pt education           Group Total Time for Session Not performed

## 2021-11-17 NOTE — OP PT TREATMENT LOG
"DOS 8-26-21   IE 10-21-21   30 Day     60 Day     Precautions fall, lifting, cardiac and CVA hx   Treatment G/Y/N Current Session Time   Modalities Total Time for Session 8-22 minutes   MHP     CP pre tx to Lsp seated- yes         Manual  Total Time for Session Not performed   STM/MFR/TPR Lsp STM, TFM along incision line   Instrument Assisted STM      Mobilizations     ROM/Flexibility manually A HS stretch, hip mobility         Therapeutic Exercise Total Time for Session 38-52 Minutes    Sets Reps Load Comment    pt ed         pt ed: HEP per written materials, daily walking in home with SPC /RW every 1-2 hours, prep with sciatic nerve glides; verbalized understanding   STRETCH             supine sciatic glide y   5       supine SKC y  20\" 3       supine piriformis                           standing hip flex at step yes 2 5       standing gastroc stretch  yes   3                     seated sciatic glides exer y   10ea    post MHP   seated pball rollouts y  10\" x10       STRENGTH             TAC seated             TAC hip abd tband  2 10 PTB  5\"H   TAC with ball add hooklying    10    5\"H   gr pball HS curl yes 2 10       controlled LTR midROM  yes   10       TAC SAQ   2 10                     HR yes 2 10   parallel bars   hip abd  nv           STS  yes  1  10    sitting on foam no UE A                 RB y       10 min L2   Neuromuscular Re-Education Total Time for Session Not performed    Sets Reps Load Comment   standing balance act             circuit walking serp cones, hurdles, curb steps             rockerboard             NBOS/tandem floor/foam                             Gait Total Time for Session Not performed   with SPC in clinic 120 ft with SPC distant S and cues for upright posture and decr stride length, 120ft with RW   parallel bars for / back walking x6   Therapeutic Activity Total Time for Session Not performed   Pt education           Group Total Time for Session Not performed              "

## 2021-11-22 ENCOUNTER — HOSPITAL ENCOUNTER (OUTPATIENT)
Dept: PHYSICAL THERAPY | Facility: CLINIC | Age: 81
Setting detail: THERAPIES SERIES
Discharge: HOME | End: 2021-11-22
Attending: ORTHOPAEDIC SURGERY
Payer: MEDICARE

## 2021-11-22 DIAGNOSIS — S39.012D STRAIN OF LUMBAR REGION, SUBSEQUENT ENCOUNTER: Primary | ICD-10-CM

## 2021-11-22 PROCEDURE — 97010 HOT OR COLD PACKS THERAPY: CPT | Mod: GP

## 2021-11-22 PROCEDURE — 97110 THERAPEUTIC EXERCISES: CPT | Mod: GP

## 2021-11-22 NOTE — OP PT TREATMENT LOG
"DOS 8-26-21   IE 10-21-21   30 Day     60 Day     Precautions fall, lifting, cardiac and CVA hx   Treatment G/Y/N Current Session Time   Modalities Total Time for Session 8-22 minutes   MHP seated 8 min pre TE    CP          Manual  Total Time for Session Not performed   STM/MFR/TPR Lsp STM, TFM along incision line   Instrument Assisted STM      Mobilizations     ROM/Flexibility manually A HS stretch, hip mobility         Therapeutic Exercise Total Time for Session 38-52 Minutes    Sets Reps Load Comment    pt ed         pt ed: HEP per written materials, daily walking in home with SPC /RW every 1-2 hours, prep with sciatic nerve glides; verbalized understanding   STRETCH             supine sciatic glide y   5       supine SKC y  20\" 3                     standing hip flex at step yes 2 5       standing gastroc stretch  yes   3                     seated sciatic glides exer y   10ea    post MHP   seated pball rollouts y  10\" x10       STRENGTH             TAC hip abd tband yes 2 10 PTB  5\"H   TAC with ball add hooklying yes   10    5\"H   gr pball HS curl yes 2 10       controlled LTR midROM  yes   10       TAC SAQ  yes 2 10  2#                   HR yes 2 10   parallel bars   hip abd  nv           STS nv  1  10    sitting on foam no UE A    gait  y        2 laps in clinic with SPC and distant S   RB y       12 min L2   Neuromuscular Re-Education Total Time for Session Not performed    Sets Reps Load Comment   standing balance act             circuit walking serp cones, hurdles, curb steps             rockerboard             NBOS/tandem floor/foam                             Gait Total Time for Session Not performed   with SPC in clinic 120 ft with SPC distant S and cues for upright posture and decr stride length, 120ft with RW   parallel bars for / back walking x6   Therapeutic Activity Total Time for Session Not performed   Pt education           Group Total Time for Session Not performed                "

## 2021-11-22 NOTE — PROGRESS NOTES
PT DAILY NOTE FOR OUTPATIENT THERAPY    Patient: Ko Shearer   MRN: [id number]  : 1940 81 y.o.  Referring Physician: Carlo Rodas MD  Date of Visit: 2021    Certification Dates: 10/21/21 through 21    Diagnosis:   1. Strain of lumbar region, subsequent encounter        Chief Complaints:       Precautions:   Precautions comments: per pt report no heavy lifting rec by MD, cardiac Hx, CVA Hx  Existing Precautions/Restrictions: lifting,fall     TODAY'S VISIT     History/Vitals/Pain/Encounter Info - 21 0909        Injury History/Precautions/Daily Required Info    Primary Therapist Mary Booker PT     Onset of Illness/Injury or Date of Surgery 21     Referring Physician Adrianna     Existing Precautions/Restrictions lifting;fall     Precautions comments per pt report no heavy lifting rec by MD, cardiac Hx, CVA Hx     Pertinent History of Current Functional Problem s/p lami and fusion 2021; limited transitional mpvement, gait with RW, pain and limited functional mobility for ADLs     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     Document Type daily treatment     Patient/Family/Caregiver Comments/Observations pt reports he attended a  service for a friend over the weekend; using his RW this am bc the SPC is in the other car; pain and stiffness in LB area     Start Time 0903     Stop Time 1000     Time Calculation (min) 57 min     Patient reported fall since last visit No        Pain Assessment    Currently in pain Yes     Preferred Pain Scale number (Numeric Rating Pain Scale)     Pain Side/Orientation bilateral;left     Pain: Body location Back;Hip     Pain Rating (0-10): Pre Activity 2     Pain Rating (0-10): Activity 5   intermittent       Pain Intervention    Intervention  MHP  TE     Post Intervention Comments less pain and stiffness                Daily Treatment Assessment and Plan - 21 0909        Daily Treatment Assessment and Plan    Progress toward goals Progressing     Daily  "Outcome Summary see below   pt reports pain levels overall are better; intermittent pain \"with certain gotcha movements\"  initiated with MHP seated, performed seated exer as noted  pt performed ther exer as noted with cues for proper pacing and positioning  gait activities with SPC working on upright posture  pt denies incr pain at end of session; pt ed to cont HEP and daily walking within home  Pt will benefit from continued skilled Physical Therapy visits to work toward established treatment goals. Focus on sx mgmt, progressive strengthening, gt/bal act for I safe functional mobility    Plan and Recommendations cont POC with progressive strengthening                     OBJECTIVE DATA TAKEN TODAY:    None taken    Today's Treatment:    DOS 8-26-21   IE 10-21-21   30 Day     60 Day     Precautions fall, lifting, cardiac and CVA hx   Treatment G/Y/N Current Session Time   Modalities Total Time for Session 8-22 minutes   MHP seated 8 min pre TE    CP          Manual  Total Time for Session Not performed   STM/MFR/TPR Lsp STM, TFM along incision line   Instrument Assisted STM      Mobilizations     ROM/Flexibility manually A HS stretch, hip mobility         Therapeutic Exercise Total Time for Session 38-52 Minutes    Sets Reps Load Comment    pt ed         pt ed: HEP per written materials, daily walking in home with SPC /RW every 1-2 hours, prep with sciatic nerve glides; verbalized understanding   STRETCH             supine sciatic glide y   5       supine SKC y  20\" 3                     standing hip flex at step yes 2 5       standing gastroc stretch  yes   3                     seated sciatic glides exer y   10ea    post MHP   seated pball rollouts y  10\" x10       STRENGTH             TAC hip abd tband yes 2 10 PTB  5\"H   TAC with ball add hooklying yes   10    5\"H   gr pball HS curl yes 2 10       controlled LTR midROM  yes   10       TAC SAQ  yes 2 10  2#                   HR yes 2 10   parallel bars   hip abd "  nv           STS nv  1  10    sitting on foam no UE A    gait  y        2 laps in clinic with SPC and distant S   RB y       12 min L2   Neuromuscular Re-Education Total Time for Session Not performed    Sets Reps Load Comment   standing balance act             circuit walking serp cones, hurdles, curb steps             rockerboard             NBOS/tandem floor/foam                             Gait Total Time for Session Not performed   with SPC in clinic 120 ft with SPC distant S and cues for upright posture and decr stride length, 120ft with RW   parallel bars for / back walking x6   Therapeutic Activity Total Time for Session Not performed   Pt education           Group Total Time for Session Not performed

## 2021-11-24 ENCOUNTER — HOSPITAL ENCOUNTER (OUTPATIENT)
Dept: PHYSICAL THERAPY | Facility: CLINIC | Age: 81
Setting detail: THERAPIES SERIES
Discharge: HOME | End: 2021-11-24
Attending: ORTHOPAEDIC SURGERY
Payer: MEDICARE

## 2021-11-24 DIAGNOSIS — S39.012D STRAIN OF LUMBAR REGION, SUBSEQUENT ENCOUNTER: Primary | ICD-10-CM

## 2021-11-24 PROCEDURE — 97010 HOT OR COLD PACKS THERAPY: CPT | Mod: GP

## 2021-11-24 PROCEDURE — 97110 THERAPEUTIC EXERCISES: CPT | Mod: GP

## 2021-11-24 PROCEDURE — 97530 THERAPEUTIC ACTIVITIES: CPT | Mod: GP

## 2021-11-24 NOTE — OP PT TREATMENT LOG
"DOS 8-26-21   IE 10-21-21   30 Day     60 Day     Precautions fall, lifting, cardiac and CVA hx   Treatment G/Y/N Current Session Time   Modalities Total Time for Session 8-22 minutes   MHP seated 8 min pre TE    CP          Manual  Total Time for Session Not performed   STM/MFR/TPR Lsp STM, TFM along incision line   Instrument Assisted STM      Mobilizations     ROM/Flexibility manually A HS stretch, hip mobility         Therapeutic Exercise Total Time for Session 23-38 Minutes    Sets Reps Load Comment    pt ed         pt ed: HEP per written materials, daily walking in home with SPC /RW every 1-2 hours, prep with sciatic nerve glides; verbalized understanding   STRETCH             supine sciatic glide y   5       supine SKC y  20\" 3                     standing hip flex at step yes 2 5       standing gastroc stretch  yes   3                     seated sciatic glides exer y   10ea    post MHP   seated pball rollouts y  10\" x10       STRENGTH             TAC hip abd tband  2 10 PTB  5\"H   TAC with ball add hooklying    10    5\"H   gr pball HS curl yes 2 10       controlled LTR midROM  yes   10       TAC SAQ  yes 2 10                    HR yes 2 10   parallel bars   hip abd  nv           STS nv  1  10    sitting on foam no UE A    gait  y        2 laps in clinic with SPC and distant S   RB y       10 min L2   Neuromuscular Re-Education Total Time for Session Not performed    Sets Reps Load Comment   standing balance act             circuit walking serp cones, hurdles, curb steps             rockerboard             NBOS/tandem floor/foam                             Gait Total Time for Session Not performed   with SPC in clinic 120 ft with SPC distant S and cues for upright posture and decr stride length, 120ft with RW   parallel bars for / back walking x6   Therapeutic Activity Total Time for Session 8-22 min   Pt education      assess  ROM, MMT, TUG, gt, Oswestry   Group Total Time for Session Not performed "

## 2021-11-24 NOTE — PROGRESS NOTES
PT PROGRESS NOTE FOR OUTPATIENT THERAPY    Patient: Ko Shearer   MRN: [id number]  : 1940 81 y.o.  Referring Physician: Carlo Rodas MD  Date of Visit: 2021      Certification Dates: 10/21/21 through 21    Recommended Frequency & Duration:  2 times/week for up to 8 weeks          Diagnosis:   1. Strain of lumbar region, subsequent encounter        Chief Complaints:  Chief Complaint   Patient presents with   • Dec Strength   • Pain   • Difficulty Walking       Precautions:   Precautions comments: per pt report no heavy lifting rec by MD, cardiac Hx, CVA Hx  Existing Precautions/Restrictions: lifting,fall     TODAY'S VISIT:     General Information - 21 1012        Session Details    Document Type progress note     Mode of Treatment physical therapy     Patient/Family/Caregiver Comments/Observations had covid booster shot earlier this am, intermittent LBP with intermittent grab of pain in back if i move just right     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        Time Calculation    Start Time 1001     Stop Time 1100     Time Calculation (min) 59 min        General Information    Onset of Illness/Injury or Date of Surgery 21     Referring Physician Adrianna     Pertinent History of Current Functional Problem s/p lami and fusion 2021; limited transitional mpvement, gait with RW, pain and limited functional mobility for ADLs     Existing Precautions/Restrictions lifting;fall     Precautions comments per pt report no heavy lifting rec by MD, cardiac Hx, CVA Hx                Daily Falls Screen - 21 1012        Daily Falls Assessment    Patient reported fall since last visit No                Pain/Vitals - 21 1012        Pain Assessment    Currently in pain Yes     Preferred Pain Scale number (Numeric Rating Pain Scale)     Pain: Body location Back     Pain Rating (0-10): Pre Activity 1     Pain Rating (0-10): Activity 8   intermittent grab of pain in LB       Pain Intervention     "Intervention  MHP TE     Post Intervention Comments less pain and stiffness                PT - 11/24/21 1012        Physical Therapy    Physical Therapy Ortho        PT Plan    Frequency of treatment 2 times/week     PT Duration 8 weeks     PT Cert From 10/21/21     PT Cert To 12/24/21     Date PT POC was sent to provider 10/21/21     Signed PT Plan of Care received?  Yes                   OBJECTIVE MEASUREMENTS/DATA:    Special Tests     Special Tests - 11/24/21 1012        Outcome measures tracking    Outcome measure used: Oswestry 46%               ROM    Range of Motion - 11/24/21 1000        Lumbar AROM    Lumbar Flexion --   reaches 6 \" from floor with incr pain at end ROM    Lumbar Extension 0     Lumbar Left SB 10   pain    Lumbar Right SB 10               MMT     Transfers    Transfers - 11/24/21 1012        Transfers    Comment STS with light use of UE, cautious guarded transitional hinge forward               Balance/Posture    Balance and Posture - 11/24/21 1012        Balance Assessment    Balance Test Results (Other) Timed Up and Go Test (TUG)        Timed Up and Go Test    Trial One: Timed Up and Go Test 17.5               Gait and Mobility    Gait and Mobility - 11/24/21 1012        Gait Training    [place], Gait independent     Variable surfaces Flat surface     Assistive Device cane, straight     Comment (Gait/Stairs) TUG 17 sec     Gait Speed (m/sec) 0.7 m/sec        Stairs Assessment    [place], Stairs supervision     Assistive Device railing     Ascending Stairs Technique step-over-step     Descending Stairs Technique step-to-step                 Today's Treatment::    DOS 8-26-21   IE 10-21-21   30 Day     60 Day     Precautions fall, lifting, cardiac and CVA hx   Treatment G/Y/N Current Session Time   Modalities Total Time for Session 8-22 minutes   MHP seated 8 min pre TE    CP          Manual  Total Time for Session Not performed   STM/MFR/TPR Lsp STM, TFM along incision line " "  Instrument Assisted STM      Mobilizations     ROM/Flexibility manually A HS stretch, hip mobility         Therapeutic Exercise Total Time for Session 23-38 Minutes    Sets Reps Load Comment    pt ed         pt ed: HEP per written materials, daily walking in home with SPC /RW every 1-2 hours, prep with sciatic nerve glides; verbalized understanding   STRETCH             supine sciatic glide y   5       supine SKC y  20\" 3                     standing hip flex at step yes 2 5       standing gastroc stretch  yes   3                     seated sciatic glides exer y   10ea    post MHP   seated pball rollouts y  10\" x10       STRENGTH             TAC hip abd tband  2 10 PTB  5\"H   TAC with ball add hooklying    10    5\"H   gr pball HS curl yes 2 10       controlled LTR midROM  yes   10       TAC SAQ  yes 2 10                    HR yes 2 10   parallel bars   hip abd  nv           STS nv  1  10    sitting on foam no UE A    gait  y        2 laps in clinic with SPC and distant S   RB y       10 min L2   Neuromuscular Re-Education Total Time for Session Not performed    Sets Reps Load Comment   standing balance act             circuit walking serp cones, hurdles, curb steps             rockerboard             NBOS/tandem floor/foam                             Gait Total Time for Session Not performed   with SPC in clinic 120 ft with SPC distant S and cues for upright posture and decr stride length, 120ft with RW   parallel bars for / back walking x6   Therapeutic Activity Total Time for Session 8-22 min   Pt education      assess  ROM, MMT, TUG, gt, Oswestry   Group Total Time for Session Not performed                      Goals Addressed                 This Visit's Progress    • PT GOALS        pt stated goals:  be able to walk and walk up/down hills  11/24/21 ONGOING  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 In 4 weeks.11/24/21 ONGOING  Pt will demonstrate increase   core and BLE strength > 4 /5 to improve functional mobility. In 4 weeks.11/24/21 ONGOING  Pt reports centralization of LLE symptoms. In 4 weeks.11/24/21 MET  Pt demonstrates  I with HEP as instructed. In 4 weeks.11/24/21 MET  Pt OSWESTRY  score < 50%. In 4 weeks. 11/24/21 MET 46%    LONG TERM GOALS:  Pt reports pain levels < 0-1 /10 to perform ADLs.  Pt will improve   Lumbar Spine  AROM to WFL to improve functional mobility. In 8 weeks.  Pt will demonstrate increase  core and BLE strength > 4+ /5 to improve functional mobility. In 8 weeks.  Pt ambulates  I with normalized gait pattern with 2 MWT > 300ft without AD.  In 8 weeks.  Pt performs stair ascend/ descend  with recip pattern I with handrail/AD. In  8  weeks.  Pt demonstrates  I with HEP as instructed. In 8 weeks.  Pt OSWESTRY score < 40%. In 8 weeks.  Pt reports resolution of L LE radicular sx.  In 8 weeks.   Pt tolerates > 15 min of CV activity. In 8 weeks.                Clinical Impression:   Pt is progressing toward established treatment goals demonstrating improvement in:   pain levels improving, overall; with reports of \"intermittent grabs\" in the low back area with certain movements.  Pt demo  improved Lsp AROM, which is evident with measurement and functional mobility with ADLs.  Pt demo  improving LE strength, and will benefit from continued strengthening to meet established goals.   Pt self rated Oswestry now 46%.   TUG now 17 sec with SPC.  Pt would benefit from continued skilled PT to address deficits and work toward long term goals. PT POC to focus on sx mgmt, ROM, strengthening, gt/bal actfor incr I with functional " mobility.  pt denies incr pain at end of session; educated to cont HEP within ability to complete, pt reports plan to go to [place] for thanksgiving tomorrow and has brother's  service saturday, discussed pain mgmt, positional changes and walking in short bouts for pain/stiffness mgmt.

## 2021-11-29 ENCOUNTER — HOSPITAL ENCOUNTER (OUTPATIENT)
Dept: PHYSICAL THERAPY | Facility: CLINIC | Age: 81
Setting detail: THERAPIES SERIES
Discharge: HOME | End: 2021-11-29
Attending: ORTHOPAEDIC SURGERY
Payer: MEDICARE

## 2021-11-29 DIAGNOSIS — S39.012D STRAIN OF LUMBAR REGION, SUBSEQUENT ENCOUNTER: Primary | ICD-10-CM

## 2021-11-29 PROCEDURE — 97010 HOT OR COLD PACKS THERAPY: CPT | Mod: GP

## 2021-11-29 PROCEDURE — 97110 THERAPEUTIC EXERCISES: CPT | Mod: GP

## 2021-11-29 NOTE — PROGRESS NOTES
PT DAILY NOTE FOR OUTPATIENT THERAPY    Patient: Ko Shearer   MRN: [id number]  : 1940 81 y.o.  Referring Physician: Carlo Rodas MD  Date of Visit: 2021    Certification Dates: 10/21/21 through 21    Diagnosis:   1. Strain of lumbar region, subsequent encounter        Chief Complaints:       Precautions:   Precautions comments: per pt report no heavy lifting rec by MD, cardiac Hx, CVA Hx  Existing Precautions/Restrictions: lifting,fall     TODAY'S VISIT     History/Vitals/Pain/Encounter Info - 21        Injury History/Precautions/Daily Required Info    Primary Therapist Mary Booker PT     Onset of Illness/Injury or Date of Surgery 21     Referring Physician Adrianna     Existing Precautions/Restrictions lifting;fall     Precautions comments per pt report no heavy lifting rec by MD, cardiac Hx, CVA Hx     Pertinent History of Current Functional Problem s/p lami and fusion 2021; limited transitional improvement, gait with RW, pain and limited functional mobility for ADLs     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     Document Type daily treatment     Patient/Family/Caregiver Comments/Observations had [facility] [employer] service on Saturday, and then was exhausted , rested.     Start Time 0905     Stop Time 1000     Time Calculation (min) 55 min     Patient reported fall since last visit No        Pain Assessment    Currently in pain Yes     Preferred Pain Scale number (Numeric Rating Pain Scale)     Pain Side/Orientation bilateral     Pain: Body location Back     Pain Rating (0-10): Pre Activity 1     Pain Rating (0-10): Activity 8        Pain Intervention    Intervention  MHP TE     Post Intervention Comments monitored during session, working within tolerance                Daily Treatment Assessment and Plan - 21        Daily Treatment Assessment and Plan    Progress toward goals Progressing     Daily Outcome Summary see below     pt reports a long weekend with  "[facility] service for his brother, and then rested yesterday for pain mgmt   initiated session with MHP seated and seated stretches  progressed to mat exercises as noted with cues for pacing and positioning  pt reports no incr pain with activity, decr in stiffness noted  pt ed to cont HEP, educated for walking every 1-2 hours within home for sx mgmt and gait endurance  Pt will benefit from continued skilled Physical Therapy visits to work toward established treatment goals. Focus on sx mgmt, end ROM, strengthening, gt/bal act for safe/I functional mobility.    Plan and Recommendations cont POC with progressive strengthening                     OBJECTIVE DATA TAKEN TODAY:    None taken    Today's Treatment:    DOS 8-26-21   IE 10-21-21   30 Day     60 Day     Precautions fall, lifting, cardiac and CVA hx   Treatment G/Y/N Current Session Time   Modalities Total Time for Session 8-22 minutes   MHP seated 8 min pre TE    CP          Manual  Total Time for Session Not performed   STM/MFR/TPR Lsp STM, TFM along incision line   Instrument Assisted STM      Mobilizations     ROM/Flexibility manually A HS stretch, hip mobility         Therapeutic Exercise Total Time for Session 39-46 Minutes    Sets Reps Load Comment    pt ed         pt ed: HEP per written materials, daily walking in home with SPC /RW every 1-2 hours   STRETCH             supine sciatic glide y   5       supine SKC y  20\" 3                     standing hip flex at step yes 2 5       standing gastroc stretch  yes   3                     seated sciatic glides exer y   10ea       seated pball rollouts y  10\" x10       STRENGTH             TAC hip abd tband y 2 10 PTB  5\"H   TAC with ball add hooklying y   10    5\"H   gr pball HS curl yes 2 10       controlled LTR midROM  yes   10       TAC SAQ  yes 2 10 2#                   HR yes 2 10   parallel bars   hip abd  nv           STS y  1  10    sitting on foam no UE A    gait  y        2 laps in clinic with SPC and " distant S   RB y       10 min L2   Neuromuscular Re-Education Total Time for Session Not performed    Sets Reps Load Comment   standing balance act             circuit walking serp cones, hurdles, curb steps             rockerboard             NBOS/tandem floor/foam                             Gait Total Time for Session Not performed   with SPC in clinic 120 ft with SPC distant S and cues for upright posture and decr stride length, 120ft with RW   parallel bars for / back walking x6   Therapeutic Activity Total Time for Session not performed   Pt education      assess  ROM, MMT, TUG, gt, Oswestry   Group Total Time for Session Not performed

## 2021-11-29 NOTE — OP PT TREATMENT LOG
"DOS 8-26-21   IE 10-21-21   30 Day     60 Day     Precautions fall, lifting, cardiac and CVA hx   Treatment G/Y/N Current Session Time   Modalities Total Time for Session 8-22 minutes   MHP seated 8 min pre TE    CP          Manual  Total Time for Session Not performed   STM/MFR/TPR Lsp STM, TFM along incision line   Instrument Assisted STM      Mobilizations     ROM/Flexibility manually A HS stretch, hip mobility         Therapeutic Exercise Total Time for Session 39-46 Minutes    Sets Reps Load Comment    pt ed         pt ed: HEP per written materials, daily walking in home with SPC /RW every 1-2 hours   STRETCH             supine sciatic glide y   5       supine SKC y  20\" 3                     standing hip flex at step yes 2 5       standing gastroc stretch  yes   3                     seated sciatic glides exer y   10ea       seated pball rollouts y  10\" x10       STRENGTH             TAC hip abd tband y 2 10 PTB  5\"H   TAC with ball add hooklying y   10    5\"H   gr pball HS curl yes 2 10       controlled LTR midROM  yes   10       TAC SAQ  yes 2 10 2#                   HR yes 2 10   parallel bars   hip abd  nv           STS y  1  10    sitting on foam no UE A    gait  y        2 laps in clinic with SPC and distant S   RB y       10 min L2   Neuromuscular Re-Education Total Time for Session Not performed    Sets Reps Load Comment   standing balance act             circuit walking serp cones, hurdles, curb steps             rockerboard             NBOS/tandem floor/foam                             Gait Total Time for Session Not performed   with SPC in clinic 120 ft with SPC distant S and cues for upright posture and decr stride length, 120ft with RW   parallel bars for / back walking x6   Therapeutic Activity Total Time for Session not performed   Pt education      assess  ROM, MMT, TUG, gt, Oswestry   Group Total Time for Session Not performed                "
Speaking Coherently

## 2021-12-01 ENCOUNTER — HOSPITAL ENCOUNTER (OUTPATIENT)
Dept: PHYSICAL THERAPY | Facility: CLINIC | Age: 81
Setting detail: THERAPIES SERIES
Discharge: HOME | End: 2021-12-01
Attending: ORTHOPAEDIC SURGERY
Payer: MEDICARE

## 2021-12-01 DIAGNOSIS — S39.012D STRAIN OF LUMBAR REGION, SUBSEQUENT ENCOUNTER: Primary | ICD-10-CM

## 2021-12-01 PROCEDURE — 97112 NEUROMUSCULAR REEDUCATION: CPT | Mod: GP

## 2021-12-01 PROCEDURE — 97110 THERAPEUTIC EXERCISES: CPT | Mod: GP

## 2021-12-01 NOTE — PROGRESS NOTES
PT DAILY NOTE FOR OUTPATIENT THERAPY    Patient: Ko Shearer   MRN: [id number]  : 1940 81 y.o.  Referring Physician: Carlo Rodas MD  Date of Visit: 2021    Certification Dates: 10/21/21 through 21    Diagnosis:   1. Strain of lumbar region, subsequent encounter        Chief Complaints:       Precautions:   Precautions comments: per pt report no heavy lifting rec by MD, cardiac Hx, CVA Hx  Existing Precautions/Restrictions: lifting,fall     TODAY'S VISIT     History/Vitals/Pain/Encounter Info - 21        Injury History/Precautions/Daily Required Info    Primary Therapist Mray Booker PT     Onset of Illness/Injury or Date of Surgery 21     Referring Physician Adrianna     Existing Precautions/Restrictions lifting;fall     Precautions comments per pt report no heavy lifting rec by MD, cardiac Hx, CVA Hx     Pertinent History of Current Functional Problem s/p lami and fusion 2021; limited transitional improvement, gait with RW, pain and limited functional mobility for ADLs     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     Document Type daily treatment     Patient/Family/Caregiver Comments/Observations pt arrives 15 min late for appt, stated 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  reports he is a little stiff this am     Start Time 0915     Stop Time 1014     Time Calculation (min) 59 min     Patient reported fall since last visit No        Pain Assessment    Currently in pain Yes     Preferred Pain Scale number (Numeric Rating Pain Scale)     Pain Side/Orientation bilateral     Pain: Body location Back     Pain Rating (0-10): Pre Activity 1     Pain Rating (0-10): Activity 2   intermittent pain with reaching /bending       Pain Intervention    Intervention  TE     Post Intervention Comments monitored during session, working within tolerance                Daily Treatment Assessment and Plan - 21 09        Daily Treatment Assessment and Plan    Progress toward goals  "Progressing     Daily Outcome Summary see below   pt arrives 15 min late for appt due to traffic  reports lower pain levels with intermittent \"grabbing\" pain with certain movement  initiated with active warmup on RB, advanced to 12 min  pt performed ther exer as noted with progressions within patient tolerance, added balance activities in circuit using SPC and close S  pt demo improving tolerance with transitional movements, working this session with repeated STS with decr pain c/o  Patient has been instructed to continue with HEP upgraded with written materials provided; pt verbalized understanding.  Pt will benefit from continued skilled Physical Therapy visits to work toward established treatment goals. Focus on sx mgmt, progressive strengthening, gt/bal act to restore safe/I functional mobility.    Plan and Recommendations cont POC with progressive strengthening                     OBJECTIVE DATA TAKEN TODAY:    None taken    Today's Treatment:    DOS 8-26-21   IE 10-21-21   30 Day     60 Day     Precautions fall, lifting, cardiac and CVA hx   Treatment G/Y/N Current Session Time   Modalities Total Time for Session not performed   MHP seated 8 min pre TE    CP          Manual  Total Time for Session Not performed   STM/MFR/TPR Lsp STM, TFM along incision line   Instrument Assisted STM      Mobilizations     ROM/Flexibility manually A HS stretch, hip mobility         Therapeutic Exercise Total Time for Session 39-46 Minutes    Sets Reps Load Comment    pt ed         pt ed: HEP per written materials, daily walking in home with SPC /RW every 1-2 hours   STRETCH             supine sciatic glide y   5       supine SKC y  20\" 3                     standing hip flex at step yes 2 5       standing gastroc stretch  yes   3                     seated sciatic glides exer y   10ea       seated pball rollouts y  10\" x10       STRENGTH             TAC hip abd tband y 2 10 PTB  5\"H   TAC with ball add hooklying y 2 10     TAC " "bridge y  2 10    5\"H   gr pball HS curl yes 2 10       controlled LTR midROM  yes   10       TAC SAQ  yes 2 10 2#                   HR yes 2 10  and 5 unilat ea UE support               STS y  3  5    sitting on foam no UE A    gait  y        2 laps in clinic with SPC and distant S   RB y       12   min L2   Neuromuscular Re-Education Total Time for Session 8-22min    Sets Reps Load Comment   standing balance act             circuit walking serp cones, hurdles y   4     SPC and S   rockerboard             NBOS/tandem floor/foam                             Gait Total Time for Session Not performed   with SPC in clinic 120 ft with SPC distant S and cues for upright posture and decr stride length, 120ft with RW   parallel bars for / back walking x6   Therapeutic Activity Total Time for Session not performed   Pt education      assess  ROM, MMT, TUG, gt, Oswestry   Group Total Time for Session Not performed                                       "

## 2021-12-01 NOTE — OP PT TREATMENT LOG
"DOS 8-26-21   IE 10-21-21   30 Day     60 Day     Precautions fall, lifting, cardiac and CVA hx   Treatment G/Y/N Current Session Time   Modalities Total Time for Session not performed   MHP seated 8 min pre TE    CP          Manual  Total Time for Session Not performed   STM/MFR/TPR Lsp STM, TFM along incision line   Instrument Assisted STM      Mobilizations     ROM/Flexibility manually A HS stretch, hip mobility         Therapeutic Exercise Total Time for Session 39-46 Minutes    Sets Reps Load Comment    pt ed         pt ed: HEP per written materials, daily walking in home with SPC /RW every 1-2 hours   STRETCH             supine sciatic glide y   5       supine SKC y  20\" 3                     standing hip flex at step yes 2 5       standing gastroc stretch  yes   3                     seated sciatic glides exer y   10ea       seated pball rollouts y  10\" x10       STRENGTH             TAC hip abd tband y 2 10 PTB  5\"H   TAC with ball add hooklying y 2 10     TAC bridge y  2 10    5\"H   gr pball HS curl yes 2 10       controlled LTR midROM  yes   10       TAC SAQ  yes 2 10 2#                   HR yes 2 10  and 5 unilat ea UE support               STS y  3  5    sitting on foam no UE A    gait  y        2 laps in clinic with SPC and distant S   RB y       12   min L2   Neuromuscular Re-Education Total Time for Session 8-22min    Sets Reps Load Comment   standing balance act             circuit walking serp cones, hurdles y   4     SPC and S   rockerboard             NBOS/tandem floor/foam                             Gait Total Time for Session Not performed   with SPC in clinic 120 ft with SPC distant S and cues for upright posture and decr stride length, 120ft with RW   parallel bars for / back walking x6   Therapeutic Activity Total Time for Session not performed   Pt education      assess  ROM, MMT, TUG, gt, Oswestry   Group Total Time for Session Not performed                  "

## 2021-12-06 ENCOUNTER — HOSPITAL ENCOUNTER (OUTPATIENT)
Dept: PHYSICAL THERAPY | Facility: CLINIC | Age: 81
Setting detail: THERAPIES SERIES
Discharge: HOME | End: 2021-12-06
Attending: ORTHOPAEDIC SURGERY
Payer: MEDICARE

## 2021-12-06 DIAGNOSIS — S39.012D STRAIN OF LUMBAR REGION, SUBSEQUENT ENCOUNTER: Primary | ICD-10-CM

## 2021-12-06 PROCEDURE — 97112 NEUROMUSCULAR REEDUCATION: CPT | Mod: GP

## 2021-12-06 PROCEDURE — 97110 THERAPEUTIC EXERCISES: CPT | Mod: GP

## 2021-12-06 NOTE — OP PT TREATMENT LOG
"DOS 8-26-21   IE 10-21-21   30 Day     60 Day     Precautions fall, lifting, cardiac and CVA hx   Treatment G/Y/N Current Session Time   Modalities Total Time for Session not performed   MHP seated 8 min pre TE    CP          Manual  Total Time for Session Not performed   STM/MFR/TPR Lsp STM, TFM along incision line   Instrument Assisted STM      Mobilizations     ROM/Flexibility manually A HS stretch, hip mobility         Therapeutic Exercise Total Time for Session 39-46 Minutes    Sets Reps Load Comment    pt ed         Patient has been instructed to continue with HEP plan to complete daily on days without PT appt and daily walking progression; pt verbalized understanding.   STRETCH             supine sciatic glide y   5       supine SKC y  20\" 3                     standing hip flex at step yes 2 5       standing gastroc stretch  yes   3                     seated sciatic glides exer y   10ea       seated pball rollouts y  10\" x10       STRENGTH             TAC hip abd tband y 2 10 PTB  5\"H   TAC with ball add hooklying y 2 10     TAC bridge y  2 10    5\"H   controlled LTR   yes   10    midROM   TAC SAQ  yes 2 10 2#                   HR yes 2 10  and 5 unilat ea UE support               STS y  3  5    sitting on foam no UE A           RB y       12   min L2   Neuromuscular Re-Education Total Time for Session 8-22min    Sets Reps Load Comment   standing balance act             circuit walking serp cones, hurdles y   4     SPC and S - y   rockerboard             NBOS/tandem floor/foam                             gt with SPC in clinic 120 ft with SPC distant S and cues for upright posture - y   parallel bars for / back walking x6   Therapeutic Activity Total Time for Session not performed   Pt education      assess  ROM, MMT, TUG, gt, Oswestry   Group Total Time for Session Not performed                      "

## 2021-12-06 NOTE — PROGRESS NOTES
PT DAILY NOTE FOR OUTPATIENT THERAPY    Patient: Ko Shearer   MRN: [id number]  : 1940 81 y.o.  Referring Physician: Carlo Rodas MD  Date of Visit: 2021    Certification Dates: 10/21/21 through 21    Diagnosis:   1. Strain of lumbar region, subsequent encounter        Chief Complaints:       Precautions:   Precautions comments: per pt report no heavy lifting rec by MD, cardiac Hx, CVA Hx  Existing Precautions/Restrictions: lifting,fall     TODAY'S VISIT     History/Vitals/Pain/Encounter Info - 21 1010        Injury History/Precautions/Daily Required Info    Primary Therapist Mary Booker PT     Onset of Illness/Injury or Date of Surgery 21     Referring Physician Adrianna     Existing Precautions/Restrictions lifting;fall     Precautions comments per pt report no heavy lifting rec by MD, cardiac Hx, CVA Hx     Pertinent History of Current Functional Problem s/p lami and fusion 2021; limited transitional improvement, gait with RW, pain and limited functional mobility for ADLs     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     Document Type daily treatment     Patient/Family/Caregiver Comments/Observations reports stiffness in the am, pain level LB 2-3/10     Start Time 1005     Stop Time 1102     Time Calculation (min) 57 min     Patient reported fall since last visit No        Pain Assessment    Currently in pain Yes     Preferred Pain Scale number (Numeric Rating Pain Scale)     Pain Side/Orientation bilateral     Pain: Body location Back     Pain Rating (0-10): Pre Activity 2     Pain Rating (0-10): Activity 3        Pain Intervention    Intervention  TE  MHP     Post Intervention Comments monitored during session, working within tolerance                Daily Treatment Assessment and Plan - 21 1010        Daily Treatment Assessment and Plan    Progress toward goals Progressing     Daily Outcome Summary see below   reports stiffness in the am  reports  trial of HEP which he completed on  "the floor, and reports difficulty getting on/off floor, rec pt do exercises on the bed  reports plan to f/u with surgeon either 12/7 or 12/8; with plan to discuss return to work at MD visit    initiated with MHP LB area  seated stretches performed with cues for pacing and positioning  transitioned to supine for mat series stretches  demo improving tolerance to transitional movements, performed balance activiiy circuit without AD  gait with light use of SPC, demo decr reliance on UE for support    Patient has been instructed to continue with HEP plan to complete daily on days without PT appt and daily walking progression; pt verbalized understanding.    reports pain at end of session 3-4/10  Pt will benefit from continued skilled Physical Therapy visits to work toward established treatment goals. Focus on sx mgmt, progression of strengthening and conditioning activity to build strength and endurance for safe/I functional mobility.    Plan and Recommendations cont POC with progressive strengthening                     OBJECTIVE DATA TAKEN TODAY:    None taken    Today's Treatment:    DOS 8-26-21   IE 10-21-21   30 Day     60 Day     Precautions fall, lifting, cardiac and CVA hx   Treatment G/Y/N Current Session Time   Modalities Total Time for Session not performed   MHP seated 8 min pre TE    CP          Manual  Total Time for Session Not performed   STM/MFR/TPR Lsp STM, TFM along incision line   Instrument Assisted STM      Mobilizations     ROM/Flexibility manually A HS stretch, hip mobility         Therapeutic Exercise Total Time for Session 39-46 Minutes    Sets Reps Load Comment    pt ed         Patient has been instructed to continue with HEP plan to complete daily on days without PT appt and daily walking progression; pt verbalized understanding.   STRETCH             supine sciatic glide y   5       supine SKC y  20\" 3                     standing hip flex at step yes 2 5       standing gastroc stretch  yes   " "3                     seated sciatic glides exer y   10ea       seated pball rollouts y  10\" x10       STRENGTH             TAC hip abd tband y 2 10 PTB  5\"H   TAC with ball add hooklying y 2 10     TAC bridge y  2 10    5\"H   controlled LTR   yes   10    midROM   TAC SAQ  yes 2 10 2#                   HR yes 2 10  and 5 unilat ea UE support               STS y  3  5    sitting on foam no UE A           RB y       12   min L2   Neuromuscular Re-Education Total Time for Session 8-22min    Sets Reps Load Comment   standing balance act             circuit walking serp cones, hurdles y   4     SPC and S - y   rockerboard             NBOS/tandem floor/foam                             gt with SPC in clinic 120 ft with SPC distant S and cues for upright posture - y   parallel bars for / back walking x6   Therapeutic Activity Total Time for Session not performed   Pt education      assess  ROM, MMT, TUG, gt, Oswestry   Group Total Time for Session Not performed                                           "

## 2021-12-08 ENCOUNTER — HOSPITAL ENCOUNTER (OUTPATIENT)
Dept: PHYSICAL THERAPY | Facility: CLINIC | Age: 81
Setting detail: THERAPIES SERIES
End: 2021-12-08
Attending: ORTHOPAEDIC SURGERY
Payer: MEDICARE

## 2021-12-10 ENCOUNTER — TELEPHONE (OUTPATIENT)
Dept: SCHEDULING | Facility: CLINIC | Age: 81
End: 2021-12-10
Payer: MEDICARE

## 2021-12-10 NOTE — TELEPHONE ENCOUNTER
Spoke with Анна, biopsy not scheduled yet. Just needs a letter faxed.    Patient with no hospitalizations or interventions in last year. Will dictate letter and have Dr Salter sign Monday and fax    Okay to hold plavix for 5 days

## 2021-12-10 NOTE — TELEPHONE ENCOUNTER
Hi called from Dr. Met Belle's office at [facility].   They are requesting Plavix hold instruction   Pt is having a Prostate Biopsy done in office     F# [phone] ATTN HI   P# [phone]

## 2021-12-20 ENCOUNTER — HOSPITAL ENCOUNTER (OUTPATIENT)
Dept: PHYSICAL THERAPY | Facility: CLINIC | Age: 81
Setting detail: THERAPIES SERIES
Discharge: HOME | End: 2021-12-20
Attending: ORTHOPAEDIC SURGERY
Payer: MEDICARE

## 2021-12-20 DIAGNOSIS — S39.012D STRAIN OF LUMBAR REGION, SUBSEQUENT ENCOUNTER: Primary | ICD-10-CM

## 2021-12-20 PROCEDURE — 97112 NEUROMUSCULAR REEDUCATION: CPT | Mod: GP

## 2021-12-20 PROCEDURE — 97110 THERAPEUTIC EXERCISES: CPT | Mod: GP

## 2021-12-20 NOTE — OP PT TREATMENT LOG
"  DOS 8-26-21   IE 10-21-21   30 Day     60 Day     Precautions fall, lifting, cardiac and CVA hx   Treatment G/Y/N Current Session Time   Modalities Total Time for Session not performed   MHP    CP           Manual  Total Time for Session Not performed   STM/MFR/TPR Lsp STM, TFM along incision line   Instrument Assisted STM      Mobilizations     ROM/Flexibility manually A HS stretch, hip mobility         Therapeutic Exercise Total Time for Session 39-46 Minutes     Sets Reps Load Comment    pt ed         Patient has been instructed to continue with HEP plan to complete daily on days without PT appt and daily walking progression; pt verbalized understanding.   STRETCH             supine sciatic glide y   3       supine SKC y  20\" 3                     standing hip flex at step yes 2 5       standing gastroc stretch  yes   3                     STRENGTH             TAC hip abd tband y 2 10 PTB  5\"H   TAC bridge n  2 10    5\"H   controlled LTR   yes   10    midROM   TAC SAQ  yes 2 10 2#                   HR yes 2 10  and 5 unilat ea UE support                 STS y  3  8    sitting on foam no UE A                 RB y       14   min L2   Neuromuscular Re-Education Total Time for Session 8-22min     Sets Reps Load Comment   standing balance act             circuit walking serp cones, hurdles, curb step y   3     no AD    rockerboard y         PA and lat   NBOS/tandem floor/foam                             gt without AD  in clinic 120 ft x3 cues for upright posture - y   parallel bars for / back walking x6 y   Therapeutic Activity Total Time for Session not performed   Pt education      assess  ROM, MMT, TUG, gt, Oswestry   Group Total Time for Session Not performed       "

## 2021-12-20 NOTE — PROGRESS NOTES
"PT DAILY NOTE FOR OUTPATIENT THERAPY    Patient: Ko Shearer   MRN: [id number]  : 1940 81 y.o.  Referring Physician: Carlo Rodas MD  Date of Visit: 2021    Certification Dates: 10/21/21 through 21    Diagnosis:   1. Strain of lumbar region, subsequent encounter        Chief Complaints:       Precautions:   Precautions comments: per pt report no heavy lifting rec by MD, cardiac Hx, CVA Hx  Existing Precautions/Restrictions: lifting,fall     TODAY'S VISIT     History/Vitals/Pain/Encounter Info - 21 1110        Injury History/Precautions/Daily Required Info    Primary Therapist Mary Booker PT     Onset of Illness/Injury or Date of Surgery 21     Referring Physician Adrianna     Existing Precautions/Restrictions lifting;fall     Precautions comments per pt report no heavy lifting rec by MD, cardiac Hx, CVA Hx     Pertinent History of Current Functional Problem s/p lami and fusion 2021; limited transitional improvement, gait with RW, pain and limited functional mobility for ADLs     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     Document Type daily treatment     Patient/Family/Caregiver Comments/Observations \"not pain, but it feels like it will go out at any second\" missed last week due to awaiting COVID testing, had negative result; reports MD cleared for RTW 2022     Start Time 1103     Stop Time 1200     Time Calculation (min) 57 min     Patient reported fall since last visit No        Pain Assessment    Currently in pain Yes     Preferred Pain Scale number (Numeric Rating Pain Scale)     Pain Side/Orientation bilateral     Pain: Body location Back     Pain Rating (0-10): Pre Activity 3     Pain Rating (0-10): Activity 4        Pain Intervention    Intervention  TE     Post Intervention Comments monitored during session, working within tolerance        Activity Vital Signs    Activity Pulse 90     Activity SpO2 96 %        Post Activity Vital Signs    Post Activity Pulse 70     Post " "Activity SpO2 95 %                Daily Treatment Assessment and Plan - 12/20/21 1110        Daily Treatment Assessment and Plan    Progress toward goals Progressing     Daily Outcome Summary pt reports he had negative COVID test x2 since last visit, had not been feeling well, so went for testing. reports intermittent pain in LB and LE radic pain/cramp with certain movements. Pt reports pain 4/10 underlying feeling coming on at start if session. Initiated with RB and progressed to standing balance activities. Pt able to perform gt/balance without AD, demo improving balance with activity and no incr pain. Pt ended session with stretching in hooklying, initially tenative iwth positional change, some LBP, quelled with brief rest. Educated pt to cont with HEP with daily stretching and porgressive walking each day start with 8-10minutes, progressing as able. Pt will benefit from skilled PT with focus on sx mgmt, strengthening, gt/balance and endurance training for full return to ADLs and safe I gait.     Plan and Recommendations cont POC with progressive strengthening                     OBJECTIVE DATA TAKEN TODAY:    None taken    Today's Treatment:                    DOS 8-26-21   IE 10-21-21   30 Day     60 Day     Precautions fall, lifting, cardiac and CVA hx   Treatment G/Y/N Current Session Time   Modalities Total Time for Session not performed   MHP    CP           Manual  Total Time for Session Not performed   STM/MFR/TPR Lsp STM, TFM along incision line   Instrument Assisted STM      Mobilizations     ROM/Flexibility manually A HS stretch, hip mobility         Therapeutic Exercise Total Time for Session 39-46 Minutes     Sets Reps Load Comment    pt ed         Patient has been instructed to continue with HEP plan to complete daily on days without PT appt and daily walking progression; pt verbalized understanding.   STRETCH             supine sciatic glide y   3       supine SKC y  20\" 3                   " "  standing hip flex at step yes 2 5       standing gastroc stretch  yes   3                     STRENGTH             TAC hip abd tband y 2 10 PTB  5\"H   TAC bridge n  2 10    5\"H   controlled LTR   yes   10    midROM   TAC SAQ  yes 2 10 2#                   HR yes 2 10  and 5 unilat ea UE support                 STS y  3  8    sitting on foam no UE A                 RB y       14   min L2   Neuromuscular Re-Education Total Time for Session 8-22min     Sets Reps Load Comment   standing balance act             circuit walking serp cones, hurdles, curb step y   3     no AD    rockerboard y         PA and lat   NBOS/tandem floor/foam                             gt without AD  in clinic 120 ft x3 cues for upright posture - y   parallel bars for / back walking x6 y   Therapeutic Activity Total Time for Session not performed   Pt education      assess  ROM, MMT, TUG, gt, Oswestry   Group Total Time for Session Not performed                            "

## 2021-12-22 ENCOUNTER — HOSPITAL ENCOUNTER (OUTPATIENT)
Dept: PHYSICAL THERAPY | Facility: CLINIC | Age: 81
Setting detail: THERAPIES SERIES
Discharge: HOME | End: 2021-12-22
Attending: ORTHOPAEDIC SURGERY
Payer: MEDICARE

## 2021-12-22 DIAGNOSIS — S39.012D STRAIN OF LUMBAR REGION, SUBSEQUENT ENCOUNTER: Primary | ICD-10-CM

## 2021-12-22 PROCEDURE — 97530 THERAPEUTIC ACTIVITIES: CPT | Mod: GP

## 2021-12-22 PROCEDURE — 97110 THERAPEUTIC EXERCISES: CPT | Mod: GP

## 2021-12-22 NOTE — PROGRESS NOTES
Referring Provider: By co-signing this Plan of Care (POC) either electronically or physically you agree to the following:    I have reviewed the the Plan of Care established by the therapist within this document and certify that the services are skilled and medically necessary. I have reviewed the plan and recommend that these services continue to meet the goals stated in this document.       EXTERNAL PROVIDER FAXING BACK:    PHYSICIAN SIGNATURE: __________________________________     DATE: ___________________    TIME: _________    IMPORTANT:  If returning this Plan of Care by fax, please fax back ONLY the signature page.   _____________________________________________________________________    [facility] Fax: [phone]      PT RE-EVALUATION FOR OUTPATIENT THERAPY    Patient: Ko Shearer   MRN: [id number]  : 1940 81 y.o.  Referring Physician: Carlo Rodas MD  Date of Visit: 2021      New Certification Dates: 21 through 22    Recommended Frequency & Duration:  2 times/week for up to 6 weeks          Diagnosis:   1. Strain of lumbar region, subsequent encounter        Chief Complaints:  Chief Complaint   Patient presents with   • Pain   • Difficulty Walking   • Dec Strength       Precautions:   Precautions comments: per pt report no heavy lifting rec by MD, cardiac Hx, CVA Hx  Existing Precautions/Restrictions: lifting,fall    TODAY'S VISIT:     General Information - 21 1108        Session Details    Document Type re-evaluation     Mode of Treatment physical therapy     Patient/Family/Caregiver Comments/Observations pain in LB area with intermittent grab in LE     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        Time Calculation    Start Time 1100     Stop Time 1203     Time Calculation (min) 63 min        General Information    Onset of Illness/Injury or Date of Surgery 21     Referring Physician Adrianna     Pertinent History of Current Functional Problem s/p lami and fusion  "August 2021; limited transitional improvement, gait with RW, pain and limited functional mobility for ADLs     Existing Precautions/Restrictions lifting;fall     Precautions comments per pt report no heavy lifting rec by MD, cardiac Hx, CVA Hx                Daily Falls Screen - 12/22/21 1108        Daily Falls Assessment    Patient reported fall since last visit No                Pain/Vitals - 12/22/21 1108        Pain Assessment    Currently in pain Yes     Preferred Pain Scale number (Numeric Rating Pain Scale)     Pain Side/Orientation bilateral     Pain: Body location Back     Pain Rating (0-10): Pre Activity 2     Pain Rating (0-10): Activity 3        Post Activity Vital Signs    Post Activity Pulse 62     Post Activity SpO2 99 %     Post Activity /62     Post Activity BP Location Left upper arm     Post Activity BP Method Manual     Patient Position Sitting        Pain Intervention    Intervention  TE     Post Intervention Comments monitored during session, working within tolerance                PT - 12/22/21 1117        Physical Therapy    Physical Therapy Ortho        PT Plan    Frequency of treatment 2 times/week     PT Duration 6 weeks     PT Cert From 12/22/21     PT Cert To 02/05/22     Date PT POC was sent to provider 12/22/21     Signed PT Plan of Care received?  No                     OBJECTIVE MEASUREMENTS/DATA:     Outcome Measures     Special Tests - 12/22/21 1117        Outcome measures tracking    Outcome measure used: Oswestry 20%        Neurodynamic Testing    Results, Straight Leg Raise Test left;positive result     Comment, Straight Leg Raise Test R 50 L 40     Results, Slump Test negative result               ROM    Range of Motion - 12/22/21 1200        Lumbar AROM    Lumbar Flexion --   reaches 5\" from floor    Lumbar Extension 0     Lumbar Left SB 10     Lumbar Right SB 10               MMT    Manual Muscle Tests - 12/22/21 1117        RIGHT: Lower Extremity Manual Muscle Test " Assessment    Hip Flexion gross movement (4/5) good     Hip Extension gross movement (4/5) good     Hip Abduction gross movement (3+/5) fair plus     Hip Adduction gross movement (4+/5) good plus     Ankle Dorsiflexion gross movement (4+/5) good plus     Ankle Plantarflexion gross movement (4-/5) good minus        LEFT: Lower Extremity Manual Muscle Test Assessment    Hip Flexion gross movement (4/5) good     Hip Extension gross movement (4-/5) good minus     Hip Abduction gross movement (3/5) fair     Hip Adduction gross movement (4/5) good     Ankle Dorsiflexion gross movement (4/5) good     Ankle Plantarflexion gross movement (3+/5) fair plus               Ortho Special Tests     Transfers    Transfers - 12/22/21 1117        Transfers    Comment STS light use of hands               Balance/Posture    Balance and Posture - 12/22/21 1117        Balance Assessment    Balance Test Results (Other) Timed Up and Go Test (TUG);Five Times Sit to Stand (FTSTS)     Five Times to Sit to Stand (FTSTS) 18.6sec        Timed Up and Go Test    Trial One: Timed Up and Go Test 13.6               Gait and Mobility    Gait and Mobility - 12/22/21 1117        Gait Training    [place], Gait independent     Variable surfaces Flat surface     Assistive Device cane, straight     2 Minute Walk Test 377ft     Gait Speed (m/sec) 1 m/sec        Stairs Assessment    [place], Stairs independent     Assistive Device railing     Ascending Stairs Technique step-over-step     Descending Stairs Technique step-over-step                 Today's Treatment::                    DOS 8-26-21   IE 10-21-21   30 Day     60 Day     Precautions fall, lifting, cardiac and CVA hx   Treatment G/Y/N Current Session Time   Modalities CPT 89054 Total Time for Session not performed   MHP    CP           Manual  CPT 46610 Total Time for Session Not performed   STM/MFR/TPR Lsp STM, TFM along incision line   Instrument Assisted STM      Mobilizations    "  ROM/Flexibility manually A HS stretch, hip mobility         Therapeutic Exercise CPT 811080 Total Time for Session 39-46 Minutes     Sets Reps Load Comment    pt ed         Patient has been instructed to continue with HEP ; cont  daily walking progression; pt verbalized understanding.   STRETCH             supine sciatic glide y   3       supine SKC y  20\" 3                     standing hip flex at step yes 2 5       standing gastroc stretch  yes   3                     STRENGTH             TAC hip abd tband y 2 10 GTB  5\"H   TAC bridge y  1 10    5\"H    LTR   yes   10       TAC SAQ  yes 3 10 2#                   HR yes 2 10  and 5 unilat ea UE support                 STS y  1  10    sitting on foam no UE A                 RB y       15  min L2   Neuromuscular Re-Education CPT 02579 Total Time for Session 0-7min     Sets Reps Load Comment   standing balance act             circuit walking serp cones, hurdles, curb step y   2     no AD    rockerboard y        PA and lat   NBOS/tandem floor/foam                             gt without AD     parallel bars    Therapeutic Activity  CPT 47544 Total Time for Session 8-22 min   Pt education  Educated patient on examination findings and progress toward goals, PT Plan of Care.   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 In 4 weeks.11/24/21 ONGOING  Pt will demonstrate increase   core and BLE strength > 4 /5 to improve functional mobility. In 4 weeks.11/24/21 ONGOING  Pt reports centralization of LLE symptoms. In 4 weeks.11/24/21 MET  Pt demonstrates  I with HEP as instructed. In 4 weeks.11/24/21 MET  Pt OSWESTRY  score < 50%. In 4 weeks. 11/24/21 MET 46%    LONG TERM GOALS:  Pt reports pain levels < 0-1 /10 to perform ADLs. 12/22/21 ONGOING  Pt will improve   Lumbar Spine  AROM to WFL to improve functional mobility. In 8 weeks.12/22/21 IMPROVING  Pt will demonstrate increase  core and BLE strength > 4+ /5 to improve functional mobility. In 8 weeks.12/22/21 IMPROVING  Pt ambulates  I with normalized gait pattern with 2 MWT > 300ft without AD.  In 8 weeks.12/22/21 MET  Pt performs stair ascend/ descend  with recip pattern I with handrail/AD. In  8  weeks.12/22/21 MET  Pt demonstrates  I with HEP as instructed. In 8 weeks.12/22/21 INSTRUCTED  Pt OSWESTRY score < 40%. In 8 weeks.12/22/21 MET 20%  Pt reports resolution of L LE radicular sx.  In 8 weeks. 12/22/21 ongoing, less freq/less intense  Pt tolerates > 15 min of CV activity. In 8 weeks.12/22/21 MET    NEW GOALS ESTABLISHED 12/22/21:  Pt I self - manages pain levels < 1-2 /10 to perform ADLs.  Pt will improve   Lumbar Spine  AROM to WFL to improve functional mobility. In 8 weeks.  Pt will demonstrate increase  BLE strength > 4+ /5 to improve functional mobility. In 8 weeks.  Pt ambulates  I with normalized gait pattern with 6 MWT > 1000ft without AD.  In 8 weeks.  Pt performs I large curb step, simulated step up to bus. In  8  weeks.  Pt demonstrates  I with HEP as instructed. In 8 weeks.  Pt OSWESTRY score < 20%. In 8 weeks.  Pt reports resolution of L LE radicular sx.  In 8 weeks.   Pt tolerates > 30 min of CV activity. In 8 weeks.              Pt has attended 14 OP PT sessions  Pt is progressing toward established treatment  goals demonstrating improvement in:   pain levels: notes pain is at times 2-3/10 with intermittent LLE radic pain sx >5/10; these intermittent LE sx are decr in freq and intensity .  Pt demo  improving Lsp and SLR ROM, which is evident with measurement and functional mobility with ADLs.  Pt demo  improving strength, and will benefit from continued strengthening to meet established goals.   Pt self rated Oswestry now 20%.  2MWT 371ft with SPC   Pt would benefit from continued skilled PT to address deficits and work toward long term goals. PT POC to focus on sx mgmt, end ROM, strengthening, gt/balance act to incr safe/I functional mobility for ADLs and possible RTW.    Planned Services: The patient's treatment will include joint and soft tissue mobilization, manual therapy, neuromuscular re-education, gait training, balance training,  therapeutic activities, therapeutic exercises and modalities prn.

## 2021-12-22 NOTE — Clinical Note
[address] [facility] [address]  [phone]      Dear DR. Rodas,      Thank you for this referral. Please review the attached notes and plan of care for your approval.  Please contact our department with any questions.     Sincerely,     Juana Booker PT    Referring Provider: By co-signing this Plan of Care (POC) either electronically or physically you agree to the following:    I have reviewed the the Plan of Care established by the therapist within this document and certify that the services are skilled and medically necessary. I have reviewed the plan and recommend that these services continue to meet the goals stated in this document.       EXTERNAL PROVIDER FAXING BACK:    PHYSICIAN SIGNATURE: __________________________________     DATE: ___________________    TIME: _________    IMPORTANT:  If returning this Plan of Care by fax, please fax back ONLY the signature page.   _____________________________________________________________________    [facility] Fax: [phone]      PT RE-EVALUATION FOR OUTPATIENT THERAPY    Patient: Ko Shearer   MRN: [id number]  : 1940 81 y.o.  Referring Physician: Carlo Rodas MD  Date of Visit: 2021      New Certification Dates: 21 through 22    Recommended Frequency & Duration:  2 times/week for up to 6 weeks          Diagnosis:   1. Strain of lumbar region, subsequent encounter        Chief Complaints:  Chief Complaint   Patient presents with   • Pain   • Difficulty Walking   • Dec Strength       Precautions:   Precautions comments: per pt report no heavy lifting rec by MD, cardiac Hx, CVA Hx  Existing Precautions/Restrictions: lifting,fall    TODAY'S VISIT:     General Information - 21 1108        Session Details    Document Type re-evaluation     Mode of Treatment physical therapy     Patient/Family/Caregiver Comments/Observations pain in LB area with intermittent grab in LE     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         Time Calculation    Start Time 1100     Stop Time 1203     Time Calculation (min) 63 min        General Information    Onset of Illness/Injury or Date of Surgery 08/26/21     Referring Physician Adrianna     Pertinent History of Current Functional Problem s/p lami and fusion August 2021; limited transitional improvement, gait with RW, pain and limited functional mobility for ADLs     Existing Precautions/Restrictions lifting;fall     Precautions comments per pt report no heavy lifting rec by MD, cardiac Hx, CVA Hx                Daily Falls Screen - 12/22/21 1108        Daily Falls Assessment    Patient reported fall since last visit No                Pain/Vitals - 12/22/21 1108        Pain Assessment    Currently in pain Yes     Preferred Pain Scale number (Numeric Rating Pain Scale)     Pain Side/Orientation bilateral     Pain: Body location Back     Pain Rating (0-10): Pre Activity 2     Pain Rating (0-10): Activity 3        Post Activity Vital Signs    Post Activity Pulse 62     Post Activity SpO2 99 %     Post Activity /62     Post Activity BP Location Left upper arm     Post Activity BP Method Manual     Patient Position Sitting        Pain Intervention    Intervention  TE     Post Intervention Comments monitored during session, working within tolerance                PT - 12/22/21 1117        Physical Therapy    Physical Therapy Ortho        PT Plan    Frequency of treatment 2 times/week     PT Duration 6 weeks     PT Cert From 12/22/21     PT Cert To 02/05/22     Date PT POC was sent to provider 12/22/21     Signed PT Plan of Care received?  No                     OBJECTIVE MEASUREMENTS/DATA:     Outcome Measures     Special Tests - 12/22/21 1117        Outcome measures tracking    Outcome measure used: Oswestry 20%        Neurodynamic Testing    Results, Straight Leg Raise Test left;positive result     Comment, Straight Leg Raise Test R 50 L 40     Results, Slump Test negative result            "    ROM    Range of Motion - 12/22/21 1200        Lumbar AROM    Lumbar Flexion --   reaches 5\" from floor    Lumbar Extension 0     Lumbar Left SB 10     Lumbar Right SB 10               MMT    Manual Muscle Tests - 12/22/21 1117        RIGHT: Lower Extremity Manual Muscle Test Assessment    Hip Flexion gross movement (4/5) good     Hip Extension gross movement (4/5) good     Hip Abduction gross movement (3+/5) fair plus     Hip Adduction gross movement (4+/5) good plus     Ankle Dorsiflexion gross movement (4+/5) good plus     Ankle Plantarflexion gross movement (4-/5) good minus        LEFT: Lower Extremity Manual Muscle Test Assessment    Hip Flexion gross movement (4/5) good     Hip Extension gross movement (4-/5) good minus     Hip Abduction gross movement (3/5) fair     Hip Adduction gross movement (4/5) good     Ankle Dorsiflexion gross movement (4/5) good     Ankle Plantarflexion gross movement (3+/5) fair plus               Ortho Special Tests     Transfers    Transfers - 12/22/21 1117        Transfers    Comment STS light use of hands               Balance/Posture    Balance and Posture - 12/22/21 1117        Balance Assessment    Balance Test Results (Other) Timed Up and Go Test (TUG);Five Times Sit to Stand (FTSTS)     Five Times to Sit to Stand (FTSTS) 18.6sec        Timed Up and Go Test    Trial One: Timed Up and Go Test 13.6               Gait and Mobility    Gait and Mobility - 12/22/21 1117        Gait Training    [place], Gait independent     Variable surfaces Flat surface     Assistive Device cane, straight     2 Minute Walk Test 377ft     Gait Speed (m/sec) 1 m/sec        Stairs Assessment    [place], Stairs independent     Assistive Device railing     Ascending Stairs Technique step-over-step     Descending Stairs Technique step-over-step                 Today's Treatment::                    DOS 8-26-21   IE 10-21-21   30 Day     60 Day     Precautions fall, lifting, cardiac and CVA " "hx   Treatment G/Y/N Current Session Time   Modalities CPT 87003 Total Time for Session not performed   MHP    CP           Manual  CPT 21272 Total Time for Session Not performed   STM/MFR/TPR Lsp STM, TFM along incision line   Instrument Assisted STM      Mobilizations     ROM/Flexibility manually A HS stretch, hip mobility         Therapeutic Exercise CPT 653600 Total Time for Session 39-46 Minutes     Sets Reps Load Comment    pt ed         Patient has been instructed to continue with HEP ; cont  daily walking progression; pt verbalized understanding.   STRETCH             supine sciatic glide y   3       supine SKC y  20\" 3                     standing hip flex at step yes 2 5       standing gastroc stretch  yes   3                     STRENGTH             TAC hip abd tband y 2 10 GTB  5\"H   TAC bridge y  1 10    5\"H    LTR   yes   10       TAC SAQ  yes 3 10 2#                   HR yes 2 10  and 5 unilat ea UE support                 STS y  1  10    sitting on foam no UE A                 RB y       15  min L2   Neuromuscular Re-Education CPT 88547 Total Time for Session 0-7min     Sets Reps Load Comment   standing balance act             circuit walking serp cones, hurdles, curb step y   2     no AD    rockerboard y        PA and lat   NBOS/tandem floor/foam                             gt without AD     parallel bars    Therapeutic Activity  CPT 63100 Total Time for Session 8-22 min   Pt education  Educated patient on examination findings and progress toward goals, PT Plan of Care.   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 In 4 weeks.11/24/21 ONGOING  Pt will demonstrate increase   core and BLE strength > 4 /5 to improve functional mobility. In 4 weeks.11/24/21 ONGOING  Pt reports centralization of LLE symptoms. In 4 weeks.11/24/21 MET  Pt demonstrates  I with HEP as instructed. In 4 weeks.11/24/21 MET  Pt OSWESTRY  score < 50%. In 4 weeks. 11/24/21 MET 46%    LONG TERM GOALS:  Pt reports pain levels < 0-1 /10 to perform ADLs. 12/22/21 ONGOING  Pt will improve   Lumbar Spine  AROM to WFL to improve functional mobility. In 8 weeks.12/22/21 IMPROVING  Pt will demonstrate increase  core and BLE strength > 4+ /5 to improve functional mobility. In 8 weeks.12/22/21 IMPROVING  Pt ambulates  I with normalized gait pattern with 2 MWT > 300ft without AD.  In 8 weeks.12/22/21 MET  Pt performs stair ascend/ descend  with recip pattern I with handrail/AD. In  8  weeks.12/22/21 MET  Pt demonstrates  I with HEP as instructed. In 8 weeks.12/22/21 INSTRUCTED  Pt OSWESTRY score < 40%. In 8 weeks.12/22/21 MET 20%  Pt reports resolution of L LE radicular sx.  In 8 weeks. 12/22/21 ongoing, less freq/less intense  Pt tolerates > 15 min of CV activity. In 8 weeks.12/22/21 MET    NEW GOALS ESTABLISHED 12/22/21:  Pt I self - manages pain levels < 1-2 /10 to perform ADLs.  Pt will improve   Lumbar Spine  AROM to WFL to improve functional mobility. In 8 weeks.  Pt will demonstrate increase  BLE strength > 4+ /5 to improve functional mobility. In 8 weeks.  Pt ambulates  I with normalized gait pattern with 6 MWT > 1000ft without AD.  In 8 weeks.  Pt performs I large curb step, simulated step up to bus. In  8  weeks.  Pt demonstrates  I " with HEP as instructed. In 8 weeks.  Pt OSWESTRY score < 20%. In 8 weeks.  Pt reports resolution of L LE radicular sx.  In 8 weeks.   Pt tolerates > 30 min of CV activity. In 8 weeks.              Pt has attended 14 OP PT sessions  Pt is progressing toward established treatment goals demonstrating improvement in:   pain levels: notes pain is at times 2-3/10 with intermittent LLE radic pain sx >5/10; these intermittent LE sx are decr in freq and intensity .  Pt demo  improving Lsp and SLR ROM, which is evident with measurement and functional mobility with ADLs.  Pt demo  improving strength, and will benefit from continued strengthening to meet established goals.   Pt self rated Oswestry now 20%.  2MWT 371ft with SPC   Pt would benefit from continued skilled PT to address deficits and work toward long term goals. PT POC to focus on sx mgmt, end ROM, strengthening, gt/balance act to incr safe/I functional mobility for ADLs and possible RTW.    Planned Services: The patient's treatment will include joint and soft tissue mobilization, manual therapy, neuromuscular re-education, gait training, balance training,  therapeutic activities, therapeutic exercises and modalities prn.

## 2021-12-27 ENCOUNTER — HOSPITAL ENCOUNTER (OUTPATIENT)
Dept: PHYSICAL THERAPY | Facility: CLINIC | Age: 81
Setting detail: THERAPIES SERIES
Discharge: HOME | End: 2021-12-27
Attending: ORTHOPAEDIC SURGERY
Payer: MEDICARE

## 2021-12-27 DIAGNOSIS — S39.012D STRAIN OF LUMBAR REGION, SUBSEQUENT ENCOUNTER: Primary | ICD-10-CM

## 2021-12-27 PROCEDURE — 97112 NEUROMUSCULAR REEDUCATION: CPT | Mod: GP

## 2021-12-27 PROCEDURE — 97110 THERAPEUTIC EXERCISES: CPT | Mod: GP

## 2021-12-27 NOTE — OP PT TREATMENT LOG
"  DOS 8-26-21   IE 10-21-21   30 Day     60 Day     Precautions fall, lifting, cardiac and CVA hx   Treatment G/Y/N Current Session Time   Modalities CPT 56468 Total Time for Session not performed   MHP    CP           Manual  CPT 24023 Total Time for Session Not performed   STM/MFR/TPR Lsp STM, TFM along incision line   Instrument Assisted STM      Mobilizations     ROM/Flexibility manually A HS stretch, hip mobility         Therapeutic Exercise CPT 588783 Total Time for Session 39-46 Minutes     Sets Reps Load Comment    pt ed         Patient has been instructed to continue with HEP ; cont  daily walking progression; pt verbalized understanding.   STRETCH             supine sciatic glide y   3       supine SKC y  20\" 3                     standing hip flex at step yes 2 5       standing gastroc stretch  yes   3                     STRENGTH             TAC hip abd tband y 2 10 GTB  5\"H   TAC bridge y  1 10    5\"H    LTR   yes   10       TAC SAQ  yes 3 10 2#                   HR  2 10  and 5 unilat ea UE support                 STS y 3 5    sitting on foam no UE A   serratus slides y 2 12     scap clocks y 2 2ea  YTB loop   rows y 2 12                   RB y       15  min L2   Neuromuscular Re-Education CPT 25911 Total Time for Session 8-22min     Sets Reps Load Comment   standing balance act             circuit walking serp cones, hurdles, curb step y   3     no AD    rockerboard y        PA and lat   NBOS/tandem floor/foam                             gt without AD     parallel bars    Therapeutic Activity  CPT 74104 Total Time for Session not performed   Pt education  Educated patient on examination findings and progress toward goals, PT Plan of Care.   upgraded HEP per written materials; pt verbalized understanding    assess  ROM, MMT, TUG, gt, Oswestry, 2MWT   Group CPT 05320 Total Time for Session Not performed       "

## 2021-12-27 NOTE — PROGRESS NOTES
PT DAILY NOTE FOR OUTPATIENT THERAPY    Patient: Ko Shearer   MRN: [id number]  : 1940 81 y.o.  Referring Physician: Carlo Rodas MD  Date of Visit: 2021    Certification Dates: 21 through 22    Diagnosis:   1. Strain of lumbar region, subsequent encounter        Chief Complaints:       Precautions:   Precautions comments: per pt report no heavy lifting rec by MD, cardiac Hx, CVA Hx  Existing Precautions/Restrictions: lifting,fall     TODAY'S VISIT     History/Vitals/Pain/Encounter Info - 21 1108        Injury History/Precautions/Daily Required Info    Primary Therapist Mary Booker PT     Onset of Illness/Injury or Date of Surgery 21     Referring Physician Adrianna     Existing Precautions/Restrictions lifting;fall     Precautions comments per pt report no heavy lifting rec by MD, cardiac Hx, CVA Hx     Pertinent History of Current Functional Problem s/p lami and fusion 2021; limited transitional improvement, gait with RW, pain and limited functional mobility for ADLs     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     Document Type daily treatment     Patient/Family/Caregiver Comments/Observations pt reports intermittent grab in L hip area; trial of OTC lumbar corset past few days for support     Start Time 1101     Stop Time 1203     Time Calculation (min) 62 min     Patient reported fall since last visit No        Pain Assessment    Currently in pain Yes     Preferred Pain Scale number (Numeric Rating Pain Scale)     Pain Side/Orientation bilateral     Pain: Body location Back     Pain Rating (0-10): Pre Activity 3     Pain Rating (0-10): Activity 3     Pain Onset/Duration intermittent grabs of pain with certain movements can be 10/10        Pain Intervention    Intervention  TE     Post Intervention Comments monitored during session, working within tolerance                Daily Treatment Assessment and Plan - 21 1108        Daily Treatment Assessment and Plan    Progress  "toward goals Progressing     Daily Outcome Summary pt reports saw his sister who is ill yesterday, expresses he is upset about that. Reports pain in LB and intermittent pain in the bacl of LEs that grabs with certain activity. Pt performed active warmup RB. Performed ther exer and balance activities with close S. Demo improving confidence with curb step and hurdles without AD. 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 Will drake from cont skilled PT to address deficits and work on strength, endurance with gt/balance for safe I functional mobility. Pt reports no incr pain at end of session. Instructed to cont HEP, trial of outdoor walking as weather permits; verbalized understanding.     Plan and Recommendations cont POC with progressive strengthening                     OBJECTIVE DATA TAKEN TODAY:    None taken    Today's Treatment:                    DOS 8-26-21   IE 10-21-21   30 Day     60 Day     Precautions fall, lifting, cardiac and CVA hx   Treatment G/Y/N Current Session Time   Modalities CPT 40314 Total Time for Session not performed   MHP    CP           Manual  CPT 20777 Total Time for Session Not performed   STM/MFR/TPR Lsp STM, TFM along incision line   Instrument Assisted STM      Mobilizations     ROM/Flexibility manually A HS stretch, hip mobility         Therapeutic Exercise CPT 089013 Total Time for Session 39-46 Minutes     Sets Reps Load Comment    pt ed         Patient has been instructed to continue with HEP ; cont  daily walking progression; pt verbalized understanding.   STRETCH             supine sciatic glide y   3       supine SKC y  20\" 3                     standing hip flex at step yes 2 5       standing gastroc stretch  yes   3                     STRENGTH             TAC hip abd tband y 2 10 GTB  5\"H   TAC bridge y  1 10    5\"H    LTR   yes   10   "     TAC SAQ  yes 3 10 2#                   HR  2 10  and 5 unilat ea UE support                 STS y 3 5    sitting on foam no UE A   serratus slides y 2 12     scap clocks y 2 2ea  YTB loop   rows y 2 12                   RB y       15  min L2   Neuromuscular Re-Education CPT 43535 Total Time for Session 8-22min     Sets Reps Load Comment   standing balance act             circuit walking serp cones, hurdles, curb step y   3     no AD    rockerboard y        PA and lat   NBOS/tandem floor/foam                             gt without AD     parallel bars    Therapeutic Activity  CPT 38542 Total Time for Session not performed   Pt education  Educated patient on examination findings and progress toward goals, PT Plan of Care.   upgraded HEP per written materials; pt verbalized understanding    assess  ROM, MMT, TUG, gt, Oswestry, 2MWT   Group CPT 75395 Total Time for Session Not performed

## 2021-12-29 ENCOUNTER — HOSPITAL ENCOUNTER (OUTPATIENT)
Dept: PHYSICAL THERAPY | Facility: CLINIC | Age: 81
Setting detail: THERAPIES SERIES
Discharge: HOME | End: 2021-12-29
Attending: ORTHOPAEDIC SURGERY
Payer: MEDICARE

## 2021-12-29 DIAGNOSIS — S39.012D STRAIN OF LUMBAR REGION, SUBSEQUENT ENCOUNTER: Primary | ICD-10-CM

## 2021-12-29 PROCEDURE — 97110 THERAPEUTIC EXERCISES: CPT | Mod: GP

## 2021-12-29 PROCEDURE — 97112 NEUROMUSCULAR REEDUCATION: CPT | Mod: GP

## 2021-12-29 NOTE — OP PT TREATMENT LOG
"  DOS 8-26-21   IE 10-21-21   30 Day     60 Day     Precautions fall, lifting, cardiac and CVA hx   Treatment G/Y/N Current Session Time   Modalities CPT 68797 Total Time for Session not performed   MHP    CP           Manual  CPT 77442 Total Time for Session Not performed   STM/MFR/TPR Lsp STM, TFM along incision line   Instrument Assisted STM      Mobilizations     ROM/Flexibility manually A HS stretch, hip mobility         Therapeutic Exercise CPT 390096 Total Time for Session 39-46 Minutes     Sets Reps Load Comment    pt ed         Patient has been instructed to continue with HEP ; cont  daily walking progression; pt verbalized understanding.   STRETCH             supine sciatic glide y   3       supine SKC y  20\" 3                     standing hip flex at step yes 2 5       standing gastroc stretch  yes   3                     STRENGTH             TAC hip abd tband y 2 10 GTB  5\"H   TAC bridge y  1 10    5\"H    LTR   yes   10       TAC SAQ  yes 3 10 2#                   HR yes 2 10  and 5 unilat ea UE support                 STS y  1  10    sitting on foam no UE A                 RB y       15  min L2   Neuromuscular Re-Education CPT 39358 Total Time for Session 0-7min     Sets Reps Load Comment   standing balance act             circuit walking serp cones, hurdles, curb step y   2     no AD    rockerboard y        PA and lat   NBOS/tandem floor/foam                             gt without AD     parallel bars    Therapeutic Activity  CPT 19520 Total Time for Session 8-22 min   Pt education  Educated patient on examination findings and progress toward goals, PT Plan of Care.   upgraded HEP per written materials; pt verbalized understanding    assess  ROM, MMT, TUG, gt, Oswestry, 2MWT   Group CPT 15333 Total Time for Session Not performed       "
done

## 2021-12-29 NOTE — PROGRESS NOTES
PT DAILY NOTE FOR OUTPATIENT THERAPY    Patient: Ko Shearer   MRN: [id number]  : 1940 81 y.o.  Referring Physician: Carlo Rodas MD  Date of Visit: 2021    Certification Dates: 21 through 22    Diagnosis:   1. Strain of lumbar region, subsequent encounter        Chief Complaints:       Precautions:   Precautions comments: per pt report no heavy lifting rec by MD, cardiac Hx, CVA Hx  Existing Precautions/Restrictions: lifting,fall     TODAY'S VISIT     History/Vitals/Pain/Encounter Info - 21 1113        Injury History/Precautions/Daily Required Info    Primary Therapist Mary Booker PT     Onset of Illness/Injury or Date of Surgery 21     Referring Physician Adrianna     Existing Precautions/Restrictions lifting;fall     Precautions comments per pt report no heavy lifting rec by MD, cardiac Hx, CVA Hx     Pertinent History of Current Functional Problem s/p lami and fusion 2021; limited transitional improvement, gait with RW, pain and limited functional mobility for ADLs     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     Document Type daily treatment     Patient/Family/Caregiver Comments/Observations pt reports did trial of walking outside on block with small incline, notes no incr pain, some fatigue with trial;  notes stiffness and tenative pain to buttock this am     Start Time 1102     Stop Time 1200     Time Calculation (min) 58 min     Patient reported fall since last visit No        Pain Assessment    Currently in pain Yes     Preferred Pain Scale number (Numeric Rating Pain Scale)     Pain Side/Orientation bilateral     Pain: Body location Back     Pain Rating (0-10): Pre Activity 3     Pain Rating (0-10): Activity 3        Pain Intervention    Intervention  TE     Post Intervention Comments monitored during session, working within tolerance                Daily Treatment Assessment and Plan - 21 1113        Daily Treatment Assessment and Plan    Progress toward goals  "Progressing     Daily Outcome Summary see below   pt reports stiffness and soreness in lower Lsp this am  active warmup on RB, notes some irritation  performed ther exer and balance activities with incr curb step height and incr rachel height with improved stability  added 5 min walk at end of session with pt noting some bilat lateral hip pain with walking effort, required use of SPC for walking effort  pt reports decr in stiffness at end of session, less pain after seated rest after walking  Patient has been instructed to continue with HEP; add daily walk outside weather permitting; pt verbalized understanding.    Plan and Recommendations Cont POC with progression as able                     OBJECTIVE DATA TAKEN TODAY:    None taken    Today's Treatment:                    DOS 8-26-21   IE 10-21-21   30 Day     60 Day     Precautions fall, lifting, cardiac and CVA hx   Treatment G/Y/N Current Session Time   Modalities CPT 51216 Total Time for Session not performed   MHP    CP           Manual  CPT 90811 Total Time for Session Not performed   STM/MFR/TPR Lsp STM, TFM along incision line   Instrument Assisted STM      Mobilizations     ROM/Flexibility manually A HS stretch, hip mobility         Therapeutic Exercise CPT 936990 Total Time for Session 39-46 Minutes     Sets Reps Load Comment    pt ed         Patient has been instructed to continue with HEP ; cont  daily walking progression; pt verbalized understanding.   STRETCH             supine sciatic glide HEP   3       supine SKC HEP  20\" 3                    standing hip flex at step y 2 5       standing gastroc stretch  y   3                     STRENGTH             TAC hip abd tband HEP 2 10 GTB  5\"H   TAC bridge HEP  1 10    5\"H    LTR  HEP   10       TAC SAQ  3 10 2#                   HR y 2 10  and 5 unilat ea UE support                 STS y 4 5    sitting on foam no UE A   serratus slides y 2 12     scap clocks y 2 2ea  YTB loop   rows y 2 12               "     RB y       15  min L2   Neuromuscular Re-Education CPT 35309 Total Time for Session 8-22min     Sets Reps Load Comment   standing balance act             circuit walking serp cones, hurdles, curb step y   4     no AD    rockerboard         PA and lat   NBOS/tandem floor/foam                             gt without AD -y with SPC 5 min continuous at end of session   parallel bars    Therapeutic Activity  CPT 89098 Total Time for Session not performed   Pt education  Educated patient on examination findings and progress toward goals, PT Plan of Care.   upgraded HEP per written materials; pt verbalized understanding    assess  ROM, MMT, TUG, gt, Oswestry, 2MWT   Group CPT 00263 Total Time for Session Not performed

## 2021-12-29 NOTE — OP PT TREATMENT LOG
"  DOS 8-26-21   IE 10-21-21   30 Day     60 Day     Precautions fall, lifting, cardiac and CVA hx   Treatment G/Y/N Current Session Time   Modalities CPT 23307 Total Time for Session not performed   MHP    CP           Manual  CPT 56612 Total Time for Session Not performed   STM/MFR/TPR Lsp STM, TFM along incision line   Instrument Assisted STM      Mobilizations     ROM/Flexibility manually A HS stretch, hip mobility         Therapeutic Exercise CPT 899875 Total Time for Session 39-46 Minutes     Sets Reps Load Comment    pt ed         Patient has been instructed to continue with HEP ; cont  daily walking progression; pt verbalized understanding.   STRETCH             supine sciatic glide HEP   3       supine SKC HEP  20\" 3                    standing hip flex at step y 2 5       standing gastroc stretch  y   3                     STRENGTH             TAC hip abd tband HEP 2 10 GTB  5\"H   TAC bridge HEP  1 10    5\"H    LTR  HEP   10       TAC SAQ  3 10 2#                   HR y 2 10  and 5 unilat ea UE support                 STS y 4 5    sitting on foam no UE A   serratus slides y 2 12     scap clocks y 2 2ea  YTB loop   rows y 2 12                   RB y       15  min L2   Neuromuscular Re-Education CPT 89826 Total Time for Session 8-22min     Sets Reps Load Comment   standing balance act             circuit walking serp cones, hurdles, curb step y   4     no AD    rockerboard         PA and lat   NBOS/tandem floor/foam                             gt without AD -y with SPC 5 min continuous at end of session   parallel bars    Therapeutic Activity  CPT 02768 Total Time for Session not performed   Pt education  Educated patient on examination findings and progress toward goals, PT Plan of Care.   upgraded HEP per written materials; pt verbalized understanding    assess  ROM, MMT, TUG, gt, Oswestry, 2MWT   Group CPT 17112 Total Time for Session Not performed         "

## 2022-01-03 ENCOUNTER — HOSPITAL ENCOUNTER (OUTPATIENT)
Dept: PHYSICAL THERAPY | Facility: CLINIC | Age: 82
Setting detail: THERAPIES SERIES
Discharge: HOME | End: 2022-01-03
Attending: ORTHOPAEDIC SURGERY
Payer: MEDICARE

## 2022-01-03 DIAGNOSIS — S39.012D STRAIN OF LUMBAR REGION, SUBSEQUENT ENCOUNTER: Primary | ICD-10-CM

## 2022-01-03 PROCEDURE — 97112 NEUROMUSCULAR REEDUCATION: CPT | Mod: GP

## 2022-01-03 PROCEDURE — 97110 THERAPEUTIC EXERCISES: CPT | Mod: GP

## 2022-01-03 NOTE — PROGRESS NOTES
PT DAILY NOTE FOR OUTPATIENT THERAPY    Patient: Ko Shearer   MRN: [id number]  : 1940 81 y.o.  Referring Physician: Carlo Rodas MD  Date of Visit: 1/3/2022    Certification Dates: 21 through 22    Diagnosis:   1. Strain of lumbar region, subsequent encounter        Chief Complaints:       Precautions:   Precautions comments: per pt report no heavy lifting rec by MD, cardiac Hx, CVA Hx  Existing Precautions/Restrictions: lifting,fall     TODAY'S VISIT     History/Vitals/Pain/Encounter Info - 22 1016        Injury History/Precautions/Daily Required Info    Primary Therapist Mary Booker PT     Onset of Illness/Injury or Date of Surgery 21     Referring Physician Adrianna     Existing Precautions/Restrictions lifting;fall     Precautions comments per pt report no heavy lifting rec by MD, cardiac Hx, CVA Hx     Pertinent History of Current Functional Problem s/p lami and fusion 2021; limited transitional improvement, gait with RW, pain and limited functional mobility for ADLs     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     Document Type daily treatment     Patient/Family/Caregiver Comments/Observations reports plan for RTW physical later today     Start Time 1003     Stop Time 1101     Time Calculation (min) 58 min     Patient reported fall since last visit No        Pain Assessment    Currently in pain Yes     Preferred Pain Scale number (Numeric Rating Pain Scale)     Pain Side/Orientation bilateral     Pain: Body location Back     Pain Rating (0-10): Pre Activity 0     Pain Rating (0-10): Activity 3     Pain Description intermittent;sharp        Pain Intervention    Intervention  TE     Post Intervention Comments monitored during session, working within tolerance                Daily Treatment Assessment and Plan - 22 1016        Daily Treatment Assessment and Plan    Progress toward goals Progressing     Daily Outcome Summary pt reports intermittent pain in LB and LE with certain  "movements, plan for RTW physical later today. Initiated session with active warmup on RB,, pt with c/o mild SOB with effort O2sat 95% and HR 78. Pt performed strengthening and balance activities, demo improved balance with stepping up on 10\" curb step and 8\" hurdles without AD. Pt performed mat series exer, with c/o pain with tranisiton from sit-supine. Pt demo good carryover iwth exer as noted, reports no incr pain/sx at end of session. Instructed to cont HEP, verbalized understanding.     Plan and Recommendations Cont POC with progression of strengthening activities; building HEP for DC end of month                     OBJECTIVE DATA TAKEN TODAY:    None taken    Today's Treatment:                    DOS 8-26-21   IE 10-21-21   30 Day     60 Day     Precautions fall, lifting, cardiac and CVA hx   Treatment G/Y/N Current Session Time   Modalities CPT 39962 Total Time for Session not performed   MHP    CP           Therapeutic Exercise CPT 386891 Total Time for Session 39-46 Minutes     Sets Reps Load Comment    pt ed         Patient has been instructed to continue with HEP ; cont  daily walking progression; pt verbalized understanding.   STRETCH             supine sciatic glide y   3       supine SKC y  20\" 3                    standing hip flex at step y 2 5       standing gastroc stretch  y   3                     STRENGTH             TAC SAQ y 3 10 2#                                 STS y 4 5    sitting on foam no UE A   serratus slides y 2 12     scap clocks y 2 2ea  YTB loop   rows y 2 12                   RB y       15  min L2   Neuromuscular Re-Education CPT 71804 Total Time for Session 8-22min     Sets Reps Load Comment   standing balance act             circuit walking serp cones, hurdles, curb step y   4     no AD    rockerboard         PA and lat   NBOS/tandem floor/foam                             gt without AD - yes 4 laps   parallel bars    Therapeutic Activity  CPT 07011 Total Time for Session not " performed   Pt education  Educated patient on examination findings and progress toward goals, PT Plan of Care.   upgraded HEP per written materials; pt verbalized understanding    assess  ROM, MMT, TUG, gt, Oswestry, 2MWT   Group CPT 52339 Total Time for Session Not performed

## 2022-01-03 NOTE — OP PT TREATMENT LOG
"  DOS 8-26-21   IE 10-21-21   30 Day     60 Day     Precautions fall, lifting, cardiac and CVA hx   Treatment G/Y/N Current Session Time   Modalities CPT 12315 Total Time for Session not performed   MHP    CP           Therapeutic Exercise CPT 050403 Total Time for Session 39-46 Minutes     Sets Reps Load Comment    pt ed         Patient has been instructed to continue with HEP ; cont  daily walking progression; pt verbalized understanding.   STRETCH             supine sciatic glide y   3       supine SKC y  20\" 3                    standing hip flex at step y 2 5       standing gastroc stretch  y   3                     STRENGTH             TAC SAQ y 3 10 2#                                 STS y 4 5    sitting on foam no UE A   serratus slides y 2 12     scap clocks y 2 2ea  YTB loop   rows y 2 12                   RB y       15  min L2   Neuromuscular Re-Education CPT 76895 Total Time for Session 8-22min     Sets Reps Load Comment   standing balance act             circuit walking serp cones, hurdles, curb step y   4     no AD    rockerboard         PA and lat   NBOS/tandem floor/foam                             gt without AD - yes 4 laps   parallel bars    Therapeutic Activity  CPT 64454 Total Time for Session not performed   Pt education  Educated patient on examination findings and progress toward goals, PT Plan of Care.   upgraded HEP per written materials; pt verbalized understanding    assess  ROM, MMT, TUG, gt, Oswestry, 2MWT   Group CPT 11998 Total Time for Session Not performed         "

## 2022-01-05 ENCOUNTER — HOSPITAL ENCOUNTER (OUTPATIENT)
Dept: PHYSICAL THERAPY | Facility: CLINIC | Age: 82
Setting detail: THERAPIES SERIES
Discharge: HOME | End: 2022-01-05
Attending: ORTHOPAEDIC SURGERY
Payer: MEDICARE

## 2022-01-05 DIAGNOSIS — S39.012D STRAIN OF LUMBAR REGION, SUBSEQUENT ENCOUNTER: Primary | ICD-10-CM

## 2022-01-05 PROCEDURE — 97112 NEUROMUSCULAR REEDUCATION: CPT | Mod: GP

## 2022-01-05 PROCEDURE — 97110 THERAPEUTIC EXERCISES: CPT | Mod: GP

## 2022-01-05 NOTE — OP PT TREATMENT LOG
"  DOS 8-26-21   IE 10-21-21   30 Day     60 Day     Precautions fall, lifting, cardiac and CVA hx   Treatment G/Y/N Current Session Time   Modalities CPT 25865 Total Time for Session not performed   MHP    CP           Therapeutic Exercise CPT 716560 Total Time for Session 39-46 Minutes     Sets Reps Load Comment    pt ed         Patient has been instructed to continue with HEP ; cont  daily walking progression; pt verbalized understanding.   STRETCH             supine sciatic glide y   3       supine SKC y  20\" 3                    standing hip flex at step y 2 5       standing gastroc stretch  y   3                     STRENGTH             TAC SAQ y 3 10 2#                                 STS y 4 5    sitting on foam no UE A   serratus slides y 2 12     scap clocks y 2 2ea  YTB loop   rows y 2 12                   RB y       15  min L2   Neuromuscular Re-Education CPT 39055 Total Time for Session 8-22min     Sets Reps Load Comment   standing balance act             circuit walking serp cones, hurdles, curb step y   4     no AD    rockerboard         PA and lat   NBOS/tandem floor/foam                             gt without AD - 6MWT   parallel bars    Therapeutic Activity  CPT 78026 Total Time for Session not performed   Pt education  Patient has been instructed to continue with HEP; pt verbalized understanding.    assess     Group CPT 37023 Total Time for Session Not performed         "

## 2022-01-05 NOTE — PROGRESS NOTES
PT DAILY NOTE FOR OUTPATIENT THERAPY    Patient: Ko Shearer   MRN: [id number]  : 1940 81 y.o.  Referring Physician: Carlo Rodas MD  Date of Visit: 2022    Certification Dates: 21 through 22    Diagnosis:   1. Strain of lumbar region, subsequent encounter        Chief Complaints:       Precautions:   Precautions comments: per pt report no heavy lifting rec by MD, cardiac Hx, CVA Hx  Existing Precautions/Restrictions: lifting,fall     TODAY'S VISIT     History/Vitals/Pain/Encounter Info - 22 1507        Injury History/Precautions/Daily Required Info    Primary Therapist Mary Booker PT     Onset of Illness/Injury or Date of Surgery 21     Referring Physician Adrianna     Existing Precautions/Restrictions lifting;fall     Precautions comments per pt report no heavy lifting rec by MD, cardiac Hx, CVA Hx     Pertinent History of Current Functional Problem s/p lami and fusion 2021; limited transitional improvement, gait with RW, pain and limited functional mobility for ADLs     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     Document Type daily treatment     Patient/Family/Caregiver Comments/Observations pt reports had RTW physical earlier this week     Start Time 1500     Stop Time 1600     Time Calculation (min) 60 min     Patient reported fall since last visit No        Pain Assessment    Currently in pain Yes     Preferred Pain Scale number (Numeric Rating Pain Scale)     Pain Side/Orientation bilateral     Pain: Body location Back     Pain Rating (0-10): Pre Activity 0     Pain Rating (0-10): Activity 3     Pain Description intermittent;sharp        Pain Intervention    Intervention  TE     Post Intervention Comments monitored during session; no incr pain at end of session                Daily Treatment Assessment and Plan - 22 1507        Daily Treatment Assessment and Plan    Progress toward goals Progressing     Daily Outcome Summary see below     pt reports no pain at start of  "session, has had intermittent pain in LLE sharp pain that grabs  initiated with active warmup on RB and then performed 6 MWT  completed 4.5min of 6 min walk test 598ft, c/o lateral hip pain B sides and mild SOB; VS  O2sat 95%  pt performed balance activities, demo improved step up/down curb step and rachel clearance with obstacles  denies any incr pain at end of session  Patient has been instructed to continue with HEP and daily walking; pt verbalized understanding.    Plan and Recommendations Cont POC with progression of strengthening activities; building HEP for DC ; with 2 more planned sessions                     OBJECTIVE DATA TAKEN TODAY:    Gait and Mobility       Today's Treatment:                    DOS 8-26-21   IE 10-21-21   30 Day     60 Day     Precautions fall, lifting, cardiac and CVA hx   Treatment G/Y/N Current Session Time   Modalities CPT 73587 Total Time for Session not performed   MHP    CP           Therapeutic Exercise CPT 172708 Total Time for Session 39-46 Minutes     Sets Reps Load Comment    pt ed         Patient has been instructed to continue with HEP ; cont  daily walking progression; pt verbalized understanding.   STRETCH             supine sciatic glide y   3       supine SKC y  20\" 3                    standing hip flex at step y 2 5       standing gastroc stretch  y   3                     STRENGTH             TAC SAQ y 3 10 2#                                 STS y 4 5    sitting on foam no UE A   serratus slides y 2 12     scap clocks y 2 2ea  YTB loop   rows y 2 12                   RB y       15  min L2   Neuromuscular Re-Education CPT 47322 Total Time for Session 8-22min     Sets Reps Load Comment   standing balance act             circuit walking serp cones, hurdles, curb step y   4     no AD    rockerboard         PA and lat   NBOS/tandem floor/foam                             gt without AD - 6MWT   parallel bars    Therapeutic Activity  CPT 05116 Total Time for Session " "not performed   Pt education  Patient has been instructed to continue with HEP; pt verbalized understanding.    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 In 4 weeks.11/24/21 ONGOING  Pt will demonstrate increase   core and BLE strength > 4 /5 to improve functional mobility. In 4 weeks.11/24/21 ONGOING  Pt reports centralization of LLE symptoms. In 4 weeks.11/24/21 MET  Pt demonstrates  I with HEP as instructed. In 4 weeks.11/24/21 MET  Pt OSWESTRY  score < 50%. In 4 weeks. 11/24/21 MET 46%    LONG TERM GOALS:  Pt reports pain levels < 0-1 /10 to perform ADLs. 12/22/21 ONGOING  Pt will improve   Lumbar Spine  AROM to WFL to improve functional mobility. In 8 weeks.12/22/21 IMPROVING  Pt will demonstrate increase  core and BLE strength > 4+ /5 to improve functional mobility. In 8 weeks.12/22/21 IMPROVING  Pt ambulates  I with normalized gait pattern with 2 MWT > 300ft without AD.  In 8 weeks.12/22/21 MET  Pt performs stair ascend/ descend  with recip pattern I with handrail/AD. In  8  weeks.12/22/21 MET  Pt demonstrates  I with HEP as instructed. In 8 weeks.12/22/21 INSTRUCTED  Pt OSWESTRY score < 40%. In 8 weeks.12/22/21 MET 20%  Pt reports resolution of L LE radicular sx.  In 8 weeks. 12/22/21 ongoing, less freq/less intense  Pt tolerates > 15 min of CV activity. In 8 weeks.12/22/21 MET    NEW GOALS ESTABLISHED " 12/22/21:  Pt I self - manages pain levels < 1-2 /10 to perform ADLs.  Pt will improve   Lumbar Spine  AROM to WFL to improve functional mobility. In 8 weeks.  Pt will demonstrate increase  BLE strength > 4+ /5 to improve functional mobility. In 8 weeks.  Pt ambulates  I with normalized gait pattern with 6 MWT > 1000ft without AD.  In 8 weeks. 1/5/22 NOT MET  Pt performs I large curb step, simulated step up to bus. In  8  weeks. 1/5/22 MET  Pt demonstrates  I with HEP as instructed. In 8 weeks.  Pt OSWESTRY score < 20%. In 8 weeks.  Pt reports resolution of L LE radicular sx.  In 8 weeks.   Pt tolerates > 30 min of CV activity. In 8 weeks. 1/5/22 MET

## 2022-01-10 ENCOUNTER — HOSPITAL ENCOUNTER (OUTPATIENT)
Dept: PHYSICAL THERAPY | Facility: CLINIC | Age: 82
Setting detail: THERAPIES SERIES
Discharge: HOME | End: 2022-01-10
Attending: ORTHOPAEDIC SURGERY
Payer: MEDICARE

## 2022-01-10 DIAGNOSIS — S39.012D STRAIN OF LUMBAR REGION, SUBSEQUENT ENCOUNTER: Primary | ICD-10-CM

## 2022-01-10 PROCEDURE — 97150 GROUP THERAPEUTIC PROCEDURES: CPT | Mod: GP

## 2022-01-10 PROCEDURE — 97112 NEUROMUSCULAR REEDUCATION: CPT | Mod: GP,59

## 2022-01-10 PROCEDURE — 97110 THERAPEUTIC EXERCISES: CPT | Mod: GP,59

## 2022-01-10 NOTE — OP PT TREATMENT LOG
"  DOS 8-26-21   IE 10-21-21   30 Day     60 Day     Precautions fall, lifting, cardiac and CVA hx   Treatment G/Y/N Current Session Time   Modalities CPT 70687 Total Time for Session not performed   MHP    CP           Therapeutic Exercise CPT 220233 Total Time for Session 8-22 Minutes     Sets Reps Load Comment    pt ed         Patient has been instructed to continue with HEP ; cont  daily walking progression; pt verbalized understanding.   STRETCH             supine sciatic glide y   3       supine SKC y  20\" 3                    standing hip flex at step y 2 5       standing gastroc stretch  y   3                     STRENGTH             TAC SAQ y 3 10 2#                                 STS y 4 5    sitting on foam no UE A   serratus slides y 2 12     scap clocks y 2 2ea  YTB loop   rows y 2 12                   RB y       15  min L2   Neuromuscular Re-Education CPT 35892 Total Time for Session 8-22min     Sets Reps Load Comment   standing balance act             circuit walking serp cones, hurdles, curb step y   4     no AD    rockerboard         PA and lat   NBOS/tandem floor/foam                             gt without AD - 3 laps   parallel bars    Therapeutic Activity  CPT 13758 Total Time for Session not performed   Pt education  Patient has been instructed to continue with HEP; pt verbalized understanding.    assess     Group CPT 51218 Total Time for Session 23-38min         "

## 2022-01-10 NOTE — PROGRESS NOTES
"PT DAILY NOTE FOR OUTPATIENT THERAPY    Patient: Ko Shearer   MRN: [id number]  : 1940 81 y.o.  Referring Physician: Carlo Rodas MD  Date of Visit: 1/10/2022    Certification Dates: 21 through 22    Diagnosis:   1. Strain of lumbar region, subsequent encounter        Chief Complaints:       Precautions:   Precautions comments: per pt report no heavy lifting rec by MD, cardiac Hx, CVA Hx  Existing Precautions/Restrictions: lifting,fall     TODAY'S VISIT     History/Vitals/Pain/Encounter Info - 01/10/22 1022        Injury History/Precautions/Daily Required Info    Primary Therapist Mary Booker PT     Onset of Illness/Injury or Date of Surgery 21     Referring Physician Adrianna     Existing Precautions/Restrictions lifting;fall     Precautions comments per pt report no heavy lifting rec by MD, cardiac Hx, CVA Hx     Pertinent History of Current Functional Problem s/p lami and fusion 2021; limited transitional improvement, gait with RW, pain and limited functional mobility for ADLs     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     Document Type daily treatment     Patient/Family/Caregiver Comments/Observations plan for RTW Th this week     Start Time 1013     Stop Time 1110     Time Calculation (min) 57 min     Patient reported fall since last visit No        Pain Assessment    Currently in pain Yes     Preferred Pain Scale number (Numeric Rating Pain Scale)     Pain Side/Orientation bilateral     Pain: Body location Back     Pain Rating (0-10): Pre Activity 0     Pain Rating (0-10): Activity 4   \"a couple of gotchas\"       Pain Intervention    Intervention  TE     Post Intervention Comments monitored during session; no incr pain at end of session                Daily Treatment Assessment and Plan - 01/10/22 1232        Daily Treatment Assessment and Plan    Progress toward goals Progressing     Daily Outcome Summary pt had 1x episode of pain with transition from sit to supine, subsides quickly " "with rest position hooklying. Pt demo improving tolerance with activity with less rest breaks between activities. Pt demo improved balance without AD with higher rachel an dcurb step training, demo I without LOB. Discussed with pt to cont with HEP, plan for 1 more session then DC to HEP. Pt in agreement with plan.     Plan and Recommendations Cont POC with progression of strengthening activities; building HEP for DC ; with 1 more planned sessions                     OBJECTIVE DATA TAKEN TODAY:    None taken    Today's Treatment:                     DOS 8-26-21    IE 10-21-21    30 Day      60 Day      Precautions fall, lifting, cardiac and CVA hx    Treatment G/Y/N Current Session Time    Modalities CPT 98565 Total Time for Session not performed    MHP     CP             Therapeutic Exercise CPT 533783 Total Time for Session 8-22 Minutes      Sets Reps Load Comment     pt ed         Patient has been instructed to continue with HEP ; cont  daily walking progression; pt verbalized understanding.    STRETCH              supine sciatic glide y   3     group   supine SKC y  20\" 3     group                 standing hip flex at step y 2 5     group   standing gastroc stretch  y   3     group                  STRENGTH              TAC SAQ y 3 10 2#                                    STS y 4 5    sitting on foam no UE A    serratus slides y 2 12      scap clocks y 2 2ea  YTB loop    rows y 2 12                     RB y       15  min L2 group   Neuromuscular Re-Education CPT 00654 Total Time for Session 8-22min      Sets Reps Load Comment    standing balance act              circuit walking serp cones, hurdles, curb step y   4     no AD     rockerboard         PA and lat    NBOS/tandem floor/foam                                gt without AD - 3 laps    parallel bars     Therapeutic Activity  CPT 61775 Total Time for Session not performed    Pt education  Patient has been instructed to continue with HEP; pt verbalized " understanding.     assess      Group CPT 94166 Total Time for Session 23-38min

## 2022-01-12 ENCOUNTER — HOSPITAL ENCOUNTER (OUTPATIENT)
Dept: PHYSICAL THERAPY | Facility: CLINIC | Age: 82
Setting detail: THERAPIES SERIES
Discharge: HOME | End: 2022-01-12
Attending: ORTHOPAEDIC SURGERY
Payer: MEDICARE

## 2022-01-12 DIAGNOSIS — S39.012D STRAIN OF LUMBAR REGION, SUBSEQUENT ENCOUNTER: Primary | ICD-10-CM

## 2022-01-12 PROCEDURE — 97110 THERAPEUTIC EXERCISES: CPT | Mod: GP

## 2022-01-12 PROCEDURE — 97530 THERAPEUTIC ACTIVITIES: CPT | Mod: GP

## 2022-01-12 PROCEDURE — 97112 NEUROMUSCULAR REEDUCATION: CPT | Mod: GP

## 2022-01-12 NOTE — OP PT TREATMENT LOG
"  DOS 8-26-21   IE 10-21-21   30 Day     60 Day     Precautions fall, lifting, cardiac and CVA hx   Treatment G/Y/N Current Session Time   Modalities CPT 51720 Total Time for Session not performed   MHP    CP           Therapeutic Exercise CPT 988643 Total Time for Session 23-37 Minutes     Sets Reps Load Comment    pt ed         Patient has been instructed to continue with HEP ; cont  daily walking progression; pt verbalized understanding.   STRETCH             supine sciatic glide y   3       supine SKC y  20\" 3        LTR y  1  10       standing hip flex at step y 2 5       standing gastroc stretch  y   3                     STRENGTH                                                 STS y 2 5    sitting on foam no UE A   serratus slides  2 12     scap clocks  2 2ea  YTB loop   rows  2 12                   RB y       15  min L2   Neuromuscular Re-Education CPT 40216 Total Time for Session 8-22min     Sets Reps Load Comment   standing balance act             circuit walking serp cones, hurdles, curb step y   4     no AD    rockerboard         PA and lat   NBOS/tandem floor/foam                             gt without AD - 6MWT   parallel bars    Therapeutic Activity  CPT 20379 Total Time for Session 8-22 min   Pt education  Patient has been instructed to continue with HEP; progression of daily walking program; pt verbalized understanding.    assess  ROM, MMT ,   Oswestry, Special Test           "

## 2022-01-12 NOTE — PROGRESS NOTES
PT DISCHARGE NOTE FOR OUTPATIENT THERAPY    Patient: Ko Shearer   MRN: [id number]  : 1940 81 y.o.  Referring Physician: Carlo Rodas MD  Date of Visit: 2022      Certification Dates: 21 through 22    Total Visit Count: 20    Chief Complaints:  Chief Complaint   Patient presents with   • Pain       Precautions:  Precautions comments: per pt report no heavy lifting rec by MD, cardiac Hx, CVA Hx  Existing Precautions/Restrictions: lifting,fall     TODAY'S VISIT:     General Information - 22 1012        Session Details    Document Type discharge evaluation     Mode of Treatment physical therapy     Patient/Family/Caregiver Comments/Observations pt reports he was cleared for RTW, plan to RTW next week tenatively     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        Time Calculation    Start Time 1000     Stop Time 1100     Time Calculation (min) 60 min        General Information    Onset of Illness/Injury or Date of Surgery 21     Referring Physician Adrianna     Pertinent History of Current Functional Problem s/p lami and fusion 2021; limited transitional improvement, gait with RW, pain and limited functional mobility for ADLs     Existing Precautions/Restrictions lifting;fall     Precautions comments per pt report no heavy lifting rec by MD, cardiac Hx, CVA Hx                Daily Falls Screen - 22 1012        Daily Falls Assessment    Patient reported fall since last visit No                Pain/Vitals - 22 1012        Pain Assessment    Currently in pain Yes     Preferred Pain Scale number (Numeric Rating Pain Scale)     Pain Side/Orientation bilateral     Pain: Body location Hip;Back     Pain Rating (0-10): Pre Activity 0     Pain Rating (0-10): Activity 4   intermittent hip pain       Pain Intervention    Intervention  TE     Post Intervention Comments monitored during session, no incr pain                PT - 22 1012        Physical Therapy    Physical Therapy Ortho   "      PT Plan    Frequency of treatment 2 times/week     PT Duration 6 weeks     PT Cert From 12/22/21     PT Cert To 02/05/22     Date PT POC was sent to provider 12/22/21     Signed PT Plan of Care received?  Yes                   OBJECTIVE MEASUREMENTS/DATA:    Outcome Measures     Special Tests - 01/12/22 1012        Outcome measures tracking    Outcome measure used: Oswestry 36%        Neurodynamic Testing    Results, Straight Leg Raise Test left;bilateral;negative result;right     Comment, Straight Leg Raise Test R50 L 50     Results, Slump Test negative result               ROM    Range of Motion - 01/12/22 1000        Lumbar AROM    Lumbar Flexion --   5\" from floor    Lumbar Extension 5     Lumbar Left SB 15     Lumbar Right SB 15     Comments mild discomfort LB with all motions               MMT    Manual Muscle Tests - 01/12/22 1012        RIGHT: Lower Extremity Manual Muscle Test Assessment    Hip Flexion gross movement (4/5) good     Hip Extension gross movement (4/5) good     Hip Abduction gross movement (4-/5) good minus     Hip Adduction gross movement (4+/5) good plus     Knee Flexion strength (4/5) good     Knee Extension strength (4/5) good     Ankle Dorsiflexion gross movement (4+/5) good plus     Ankle Plantarflexion gross movement (4/5) good        LEFT: Lower Extremity Manual Muscle Test Assessment    Hip Flexion gross movement (4/5) good     Hip Extension gross movement (4/5) good     Hip Abduction gross movement (4/5) good     Hip Adduction gross movement (4/5) good     Knee Flexion strength (4/5) good     Knee Extension strength (4/5) good     Ankle Dorsiflexion gross movement (4+/5) good plus     Ankle Plantarflexion gross movement (4-/5) good minus               Ortho Special Tests     Balance/Posture    Balance and Posture - 01/12/22 1012        Balance Assessment    Five Times to Sit to Stand (FTSTS) 18sec               Gait and Mobility    Gait and Mobility - 01/12/22 1012        Gait " "Training    [place], Gait independent     Variable surfaces Flat surface     Assistive Device none;cane, straight     6 Minute Walk Test completed 2 min without SPC then used SPC for rest of test  665ft     Gait Speed (m/sec) 1.2 m/sec        Stairs Assessment    [place], Stairs independent     Assistive Device railing     Ascending Stairs Technique step-over-step     Descending Stairs Technique step-over-step                 Today's Treatment:                    DOS 8-26-21   IE 10-21-21   30 Day     60 Day     Precautions fall, lifting, cardiac and CVA hx   Treatment G/Y/N Current Session Time   Modalities CPT 36601 Total Time for Session not performed   MHP    CP           Therapeutic Exercise CPT 957399 Total Time for Session 23-37 Minutes     Sets Reps Load Comment    pt ed         Patient has been instructed to continue with HEP ; cont  daily walking progression; pt verbalized understanding.   STRETCH             supine sciatic glide y   3       supine SKC y  20\" 3        LTR y  1  10       standing hip flex at step y 2 5       standing gastroc stretch  y   3                     STRENGTH                                                 STS y 2 5    sitting on foam no UE A   serratus slides  2 12     scap clocks  2 2ea  YTB loop   rows  2 12                   RB y       15  min L2   Neuromuscular Re-Education CPT 63216 Total Time for Session 8-22min     Sets Reps Load Comment   standing balance act             circuit walking serp cones, hurdles, curb step y   4     no AD    rockerboard         PA and lat   NBOS/tandem floor/foam                             gt without AD - 6MWT   parallel bars    Therapeutic Activity  CPT 94711 Total Time for Session 8-22 min   Pt education  Patient has been instructed to continue with HEP; progression of daily walking program; pt verbalized understanding.    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 In 4 weeks.11/24/21 ONGOING  Pt will demonstrate increase   core and BLE strength > 4 /5 to improve functional mobility. In 4 weeks.11/24/21 ONGOING  Pt reports centralization of LLE symptoms. In 4 weeks.11/24/21 MET  Pt demonstrates  I with HEP as instructed. In 4 weeks.11/24/21 MET  Pt OSWESTRY  score < 50%. In 4 weeks. 11/24/21 MET 46%    LONG TERM GOALS:  Pt reports pain levels < 0-1 /10 to perform ADLs. 12/22/21 ONGOING  Pt will improve   Lumbar Spine  AROM to WFL to improve functional mobility. In 8 weeks.12/22/21 IMPROVING  Pt will demonstrate increase  core and BLE strength > 4+ /5 to improve functional mobility. In 8 weeks.12/22/21 IMPROVING  Pt ambulates  I with normalized gait pattern with 2 MWT > 300ft without AD.  In 8 weeks.12/22/21 MET  Pt performs stair ascend/ descend  with recip pattern I with handrail/AD. In  8  weeks.12/22/21 MET  Pt demonstrates  I with HEP as instructed. In 8 weeks.12/22/21 INSTRUCTED  Pt OSWESTRY score < 40%. In 8 weeks.12/22/21 MET 20%  Pt reports resolution of L LE radicular sx.  In 8 weeks. 12/22/21 ongoing, less freq/less intense  Pt tolerates > 15 min of CV activity. In 8 weeks.12/22/21 MET    NEW GOALS ESTABLISHED 12/22/21:  Pt I self - manages pain levels < 1-2 /10 to perform " ADLs.1/12/22  I manages pain  Pt will improve   Lumbar Spine  AROM to WFL to improve functional mobility. In 8 weeks.1/12/22 MET  Pt will demonstrate increase  BLE strength > 4+ /5 to improve functional mobility. In 8 weeks.1/12/22 IMPROVED  Pt ambulates  I with normalized gait pattern with 6 MWT > 1000ft without AD.  In 8 weeks. 1/5/22 NOT MET; 1/12/22   Pt performs I large curb step, simulated step up to bus. In  8  weeks. 1/5/22 MET  Pt demonstrates  I with HEP as instructed. In 8 weeks.1/12/22 MET  Pt OSWESTRY score < 20%. In 8 weeks.1/12/22  36%  Pt reports resolution of L LE radicular sx.  In 8 weeks. 1/12/22 IMPROVED, intermittent only  Pt tolerates > 30 min of CV activity. In 8 weeks. 1/5/22 MET              Clinical Impression:   Pt attended 20 OP PT visits  Pt is progressing toward established treatment goals demonstrating improvement in:   reports improvements in pain levels with intermittent pain levels in bilateral hips and LB area; pt demo I in self mgmt in intermittent pain bouts.  Pt demo  improved Lsp AROM, which is evident with improving ROM with no LE sx provocation and bilat SLR 50' without radic sx; improving functional mobility with ADLs.  Pt demo  improved BLE and core strength, and will benefit from continued strengthening thru HEP.  Pt self rated Oswestry now 36%.   Pt in agreement for plan to DC this visit. Pt has been instructed in HEP, written materials provided, and daily walking progression, pt plans to cont Independently with HEP.

## 2022-01-21 ENCOUNTER — OFFICE VISIT (OUTPATIENT)
Dept: CARDIOLOGY | Facility: CLINIC | Age: 82
End: 2022-01-21
Payer: MEDICARE

## 2022-01-21 VITALS
OXYGEN SATURATION: 96 % | DIASTOLIC BLOOD PRESSURE: 60 MMHG | SYSTOLIC BLOOD PRESSURE: 118 MMHG | HEART RATE: 75 BPM | HEIGHT: 71 IN | BODY MASS INDEX: 25.2 KG/M2 | WEIGHT: 180 LBS

## 2022-01-21 DIAGNOSIS — I25.10 CORONARY ARTERY DISEASE INVOLVING NATIVE CORONARY ARTERY OF NATIVE HEART WITHOUT ANGINA PECTORIS: Primary | ICD-10-CM

## 2022-01-21 PROCEDURE — 99214 OFFICE O/P EST MOD 30 MIN: CPT | Performed by: INTERNAL MEDICINE

## 2022-01-21 PROCEDURE — 93000 ELECTROCARDIOGRAM COMPLETE: CPT | Performed by: INTERNAL MEDICINE

## 2022-01-21 ASSESSMENT — ENCOUNTER SYMPTOMS
DYSURIA: 0
DEPRESSION: 1
ENDOCRINE NEGATIVE: 1
MYALGIAS: 0
ORTHOPNEA: 0
BRUISES/BLEEDS EASILY: 0
BACK PAIN: 1
POOR WOUND HEALING: 0
FEVER: 0
LIGHT-HEADEDNESS: 0
DYSPNEA ON EXERTION: 0
HEARTBURN: 0
JOINT SWELLING: 0
BLURRED VISION: 0
EXCESSIVE APPETITE: 0
ABDOMINAL PAIN: 0
FREQUENCY: 1
PARESTHESIAS: 0
PND: 0
HEMATURIA: 0
CONSTIPATION: 0
HOARSE VOICE: 0
DIZZINESS: 0
WEAKNESS: 0
NERVOUS/ANXIOUS: 1
DECREASED APPETITE: 0
SHORTNESS OF BREATH: 0
WHEEZING: 0
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
## 2022-02-14 ENCOUNTER — APPOINTMENT (OUTPATIENT)
Dept: LAB | Facility: HOSPITAL | Age: 82
End: 2022-02-14
Attending: UROLOGY
Payer: MEDICARE

## 2022-02-14 ENCOUNTER — TRANSCRIBE ORDERS (OUTPATIENT)
Dept: SCHEDULING | Age: 82
End: 2022-02-14

## 2022-02-14 ENCOUNTER — TRANSCRIBE ORDERS (OUTPATIENT)
Dept: REGISTRATION | Facility: HOSPITAL | Age: 82
End: 2022-02-14

## 2022-02-14 DIAGNOSIS — C61 MALIGNANT NEOPLASM OF PROSTATE (CMS/HCC): Primary | ICD-10-CM

## 2022-02-14 DIAGNOSIS — C61 MALIGNANT NEOPLASM OF PROSTATE (CMS/HCC): ICD-10-CM

## 2022-02-14 LAB
ALBUMIN SERPL-MCNC: 3.7 G/DL (ref 3.4–5)
ALP SERPL-CCNC: 58 IU/L (ref 35–126)
ALT SERPL-CCNC: 24 IU/L (ref 16–63)
ANION GAP SERPL CALC-SCNC: 8 MEQ/L (ref 3–15)
AST SERPL-CCNC: 31 IU/L (ref 15–41)
BASOPHILS # BLD: 0.02 K/UL (ref 0.01–0.1)
BASOPHILS NFR BLD: 0.3 %
BILIRUB SERPL-MCNC: 0.6 MG/DL (ref 0.3–1.2)
BUN SERPL-MCNC: 13 MG/DL (ref 8–20)
CALCIUM SERPL-MCNC: 9.2 MG/DL (ref 8.9–10.3)
CHLORIDE SERPL-SCNC: 102 MEQ/L (ref 98–109)
CO2 SERPL-SCNC: 26 MEQ/L (ref 22–32)
CREAT SERPL-MCNC: 1.3 MG/DL (ref 0.8–1.3)
DIFFERENTIAL METHOD BLD: ABNORMAL
EOSINOPHIL # BLD: 0.58 K/UL (ref 0.04–0.54)
EOSINOPHIL NFR BLD: 8.8 %
ERYTHROCYTE [DISTWIDTH] IN BLOOD BY AUTOMATED COUNT: 14.2 % (ref 11.6–14.4)
GFR SERPL CREATININE-BSD FRML MDRD: 53.3 ML/MIN/1.73M*2
GLUCOSE SERPL-MCNC: 153 MG/DL (ref 70–99)
HCT VFR BLDCO AUTO: 44.3 % (ref 40.1–51)
HGB BLD-MCNC: 14.5 G/DL (ref 13.7–17.5)
IMM GRANULOCYTES # BLD AUTO: 0.01 K/UL (ref 0–0.08)
IMM GRANULOCYTES NFR BLD AUTO: 0.2 %
LYMPHOCYTES # BLD: 1.47 K/UL (ref 1.2–3.5)
LYMPHOCYTES NFR BLD: 22.4 %
MCH RBC QN AUTO: 33.2 PG (ref 28–33.2)
MCHC RBC AUTO-ENTMCNC: 32.7 G/DL (ref 32.2–36.5)
MCV RBC AUTO: 101.4 FL (ref 83–98)
MONOCYTES # BLD: 0.99 K/UL (ref 0.3–1)
MONOCYTES NFR BLD: 15.1 %
NEUTROPHILS # BLD: 3.49 K/UL (ref 1.7–7)
NEUTS SEG NFR BLD: 53.2 %
NRBC BLD-RTO: 0 %
PDW BLD AUTO: 9.5 FL (ref 9.4–12.4)
PLATELET # BLD AUTO: 174 K/UL (ref 150–350)
POTASSIUM SERPL-SCNC: 4.1 MEQ/L (ref 3.6–5.1)
PROT SERPL-MCNC: 6.5 G/DL (ref 6–8.2)
RBC # BLD AUTO: 4.37 M/UL (ref 4.5–5.8)
SODIUM SERPL-SCNC: 136 MEQ/L (ref 136–144)
WBC # BLD AUTO: 6.56 K/UL (ref 3.8–10.5)

## 2022-02-14 PROCEDURE — 80053 COMPREHEN METABOLIC PANEL: CPT

## 2022-02-14 PROCEDURE — 82310 ASSAY OF CALCIUM: CPT

## 2022-02-14 PROCEDURE — 85025 COMPLETE CBC W/AUTO DIFF WBC: CPT

## 2022-02-14 PROCEDURE — 36415 COLL VENOUS BLD VENIPUNCTURE: CPT

## 2022-02-21 ENCOUNTER — HOSPITAL ENCOUNTER (OUTPATIENT)
Dept: RADIOLOGY | Facility: HOSPITAL | Age: 82
Setting detail: NUCLEAR MEDICINE
Discharge: HOME | End: 2022-02-21
Attending: UROLOGY
Payer: MEDICARE

## 2022-02-21 ENCOUNTER — HOSPITAL ENCOUNTER (OUTPATIENT)
Dept: RADIOLOGY | Facility: HOSPITAL | Age: 82
Discharge: HOME | End: 2022-02-21
Attending: UROLOGY
Payer: MEDICARE

## 2022-02-21 DIAGNOSIS — C61 MALIGNANT NEOPLASM OF PROSTATE (CMS/HCC): ICD-10-CM

## 2022-02-21 PROCEDURE — 78306 BONE IMAGING WHOLE BODY: CPT

## 2022-02-21 PROCEDURE — 74177 CT ABD & PELVIS W/CONTRAST: CPT

## 2022-02-21 PROCEDURE — 63600105 HC IODINE BASED CONTRAST: Performed by: UROLOGY

## 2022-02-21 PROCEDURE — A9503 TC99M MEDRONATE: HCPCS | Performed by: UROLOGY

## 2022-02-21 RX ADMIN — TECHNETIUM TC 99M MEDRONATE 25.8 MILLICURIE: 25 INJECTION, POWDER, FOR SOLUTION INTRAVENOUS at 08:15

## 2022-02-21 RX ADMIN — IOHEXOL 100 ML: 350 INJECTION, SOLUTION INTRAVENOUS at 09:13

## 2022-04-27 ENCOUNTER — HOSPITAL ENCOUNTER (OUTPATIENT)
Dept: RADIOLOGY | Facility: CLINIC | Age: 82
Discharge: HOME | End: 2022-04-27
Attending: RADIOLOGY
Payer: MEDICARE

## 2022-04-27 DIAGNOSIS — C61 MALIGNANT NEOPLASM OF PROSTATE (CMS/HCC): ICD-10-CM

## 2022-05-13 ENCOUNTER — TELEPHONE (OUTPATIENT)
Dept: SCHEDULING | Facility: CLINIC | Age: 82
End: 2022-05-13
Payer: MEDICARE

## 2022-05-13 DIAGNOSIS — Z02.4 ENCOUNTER FOR DEPARTMENT OF TRANSPORTATION (DOT) EXAMINATION FOR SCHOOL BUS LICENSE: ICD-10-CM

## 2022-05-13 DIAGNOSIS — R06.09 OTHER FORMS OF DYSPNEA: Primary | ICD-10-CM

## 2022-05-13 DIAGNOSIS — I25.10 CAD IN NATIVE ARTERY: ICD-10-CM

## 2022-05-13 DIAGNOSIS — I25.83 CORONARY ATHEROSCLEROSIS DUE TO LIPID RICH PLAQUE: ICD-10-CM

## 2022-05-13 NOTE — TELEPHONE ENCOUNTER
When I schedule the stress echo is says that Medicare will not accept the diagnosis codes.  Can you put another diagnosis code  Will not accept I25.10 or Z02.4  I am unable to schedule without the new code.  Thank you

## 2022-05-13 NOTE — TELEPHONE ENCOUNTER
I called the pt.  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  It is scanned in under media.  He will fax the form to [phone] in about 15 mins.  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  He tells me that he can walk on the treadmill.    Fartun--The pt wants to know if stress echocardiograms are performed in [place].  I told him that I would forward the message and ask.    Elba LAINEZ and can you keep an eye out for the form.

## 2022-05-13 NOTE — TELEPHONE ENCOUNTER
Yes stress echos are done on Tuesday and Wednesday only at [place].  Would you like me to call and schedule

## 2022-05-13 NOTE — TELEPHONE ENCOUNTER
Pt has a cardio form, for being able to drive a bus.  Pt states Dr Salter has filled out form before, abut 3 years ago.  Pt needs the form again.  Pt is going to fax the form to the .  Pt wanted to speak with Jennifer regarding the form and getting handled.  Pt can be reached at [phone].

## 2022-05-16 NOTE — TELEPHONE ENCOUNTER
Elba----I have the [employer] forms on my desk.  I partially filled them out.  I will add the results of his stress test when they become available.

## 2022-05-31 ENCOUNTER — HOSPITAL ENCOUNTER (OUTPATIENT)
Dept: CARDIOLOGY | Facility: CLINIC | Age: 82
Discharge: HOME | End: 2022-05-31
Attending: INTERNAL MEDICINE
Payer: MEDICARE

## 2022-05-31 VITALS — SYSTOLIC BLOOD PRESSURE: 114 MMHG | HEART RATE: 87 BPM | DIASTOLIC BLOOD PRESSURE: 64 MMHG

## 2022-05-31 DIAGNOSIS — I25.10 CAD IN NATIVE ARTERY: ICD-10-CM

## 2022-05-31 DIAGNOSIS — Z02.4 ENCOUNTER FOR DEPARTMENT OF TRANSPORTATION (DOT) EXAMINATION FOR SCHOOL BUS LICENSE: ICD-10-CM

## 2022-05-31 DIAGNOSIS — I25.83 CORONARY ATHEROSCLEROSIS DUE TO LIPID RICH PLAQUE: ICD-10-CM

## 2022-05-31 DIAGNOSIS — R06.09 OTHER FORMS OF DYSPNEA: ICD-10-CM

## 2022-05-31 PROCEDURE — 93018 CV STRESS TEST I&R ONLY: CPT | Performed by: INTERNAL MEDICINE

## 2022-05-31 PROCEDURE — 93016 CV STRESS TEST SUPVJ ONLY: CPT | Performed by: INTERNAL MEDICINE

## 2022-05-31 PROCEDURE — 93350 STRESS TTE ONLY: CPT | Mod: 26 | Performed by: INTERNAL MEDICINE

## 2022-05-31 PROCEDURE — 93351 STRESS TTE COMPLETE: CPT

## 2022-06-01 ENCOUNTER — TELEPHONE (OUTPATIENT)
Dept: CARDIOLOGY | Facility: CLINIC | Age: 82
End: 2022-06-01
Payer: MEDICARE

## 2022-06-01 LAB
AORTIC ROOT ANNULUS: 3.9 CM
ASCENDING AORTA: 3.9 CM
E WAVE DECELERATION TIME: 338 MS
E/A RATIO: 0.7
FRACTIONAL SHORTENING: 31.2 %
INTERVENTRICULAR SEPTUM: 1.24 CM
LA/AORTA RATIO: 1.18
LAAS-AP4: 18.8 CM2
LAD 2D: 4.6 CM
LALD A4C: 5.43 CM
LEFT ATRIUM VOLUME: 54.2 CM3
LEFT INTERNAL DIMENSION IN SYSTOLE: 2.67 CM
LEFT VENTRICULAR INTERNAL DIMENSION IN DIASTOLE: 3.88 CM
LEFT VENTRICULAR POSTERIOR WALL IN END DIASTOLE: 1.27 CM
LVOT 2D: 2 CM
LVOT A: 3.14 CM2
MR VELOCITY: 4.17 M/S
MV PEAK A VEL: 0.66 M/S
MV PEAK E VEL: 0.44 M/S
MV VALVE AREA P 1/2 METHOD: 2.22 CM2
POSTERIOR WALL: 1.27 CM
STRESS ANGINA INDEX: 0
STRESS BASELINE BP: NORMAL MMHG
STRESS BASELINE HR: 82 BPM
STRESS PERCENT HR: 89 %
STRESS POST ESTIMATED WORKLOAD: 8.5 METS
STRESS POST EXERCISE DUR MIN: 7 MIN
STRESS POST EXERCISE DUR SEC: 0 SEC
STRESS POST PEAK BP: NORMAL MMHG
STRESS POST PEAK HR: 125 BPM
STRESS TARGET HR: 120 BPM
TAPSE: 1.3 CM
TR MAX PG: 40 MMHG
TRICUSPID VALVE PEAK REGURGITATION VELOCITY: 3.17 M/S

## 2022-06-01 NOTE — TELEPHONE ENCOUNTER
----- Message from Manjeet Salter MD sent at 6/1/2022  9:20 AM EDT -----  Pls call pt with result.  Thanks    [facility]

## 2022-06-01 NOTE — TELEPHONE ENCOUNTER
I spoke to the pt and reviewed his stress test. No obvious ischemia.    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  Can you fill them out and have [facility] sign them.  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  His email is under demographics and has been confirmed.  TY    I have filled these out in the past and they are under media.

## 2022-06-07 ENCOUNTER — HOSPITAL ENCOUNTER (OUTPATIENT)
Dept: RADIOLOGY | Facility: CLINIC | Age: 82
Discharge: HOME | End: 2022-06-07
Attending: PHYSICIAN ASSISTANT
Payer: MEDICARE

## 2022-06-07 ENCOUNTER — TRANSCRIBE ORDERS (OUTPATIENT)
Dept: SCHEDULING | Age: 82
End: 2022-06-07

## 2022-06-07 DIAGNOSIS — N20.0 CALCULUS OF KIDNEY: ICD-10-CM

## 2022-06-07 DIAGNOSIS — R10.9 UNSPECIFIED ABDOMINAL PAIN: ICD-10-CM

## 2022-06-07 DIAGNOSIS — R10.9 UNSPECIFIED ABDOMINAL PAIN: Primary | ICD-10-CM

## 2022-06-07 PROCEDURE — 74176 CT ABD & PELVIS W/O CONTRAST: CPT

## 2022-07-18 NOTE — PLAN OF CARE
Patient OOB w/ 1x assist w/ walker. Hemovac Dced by ortho, cleared for DC home. Pain controlled w/ PRN Tylenol and oxycodone. Instructions provided. Patient to be Dced home w/ wife.    4

## 2022-07-22 ENCOUNTER — OFFICE VISIT (OUTPATIENT)
Dept: CARDIOLOGY | Facility: CLINIC | Age: 82
End: 2022-07-22
Payer: MEDICARE

## 2022-07-22 ENCOUNTER — TELEPHONE (OUTPATIENT)
Dept: SCHEDULING | Facility: CLINIC | Age: 82
End: 2022-07-22
Payer: MEDICARE

## 2022-07-22 VITALS
OXYGEN SATURATION: 93 % | HEART RATE: 85 BPM | HEIGHT: 71 IN | SYSTOLIC BLOOD PRESSURE: 118 MMHG | BODY MASS INDEX: 24.92 KG/M2 | DIASTOLIC BLOOD PRESSURE: 60 MMHG | WEIGHT: 178 LBS | RESPIRATION RATE: 16 BRPM

## 2022-07-22 DIAGNOSIS — I25.10 CAD IN NATIVE ARTERY: Primary | ICD-10-CM

## 2022-07-22 PROCEDURE — 93000 ELECTROCARDIOGRAM COMPLETE: CPT | Performed by: INTERNAL MEDICINE

## 2022-07-22 PROCEDURE — 99213 OFFICE O/P EST LOW 20 MIN: CPT | Performed by: INTERNAL MEDICINE

## 2022-07-22 ASSESSMENT — ENCOUNTER SYMPTOMS
ORTHOPNEA: 0
SHORTNESS OF BREATH: 0
DIZZINESS: 0
WHEEZING: 0
BLURRED VISION: 0
EXCESSIVE APPETITE: 0
ABDOMINAL PAIN: 0
ENDOCRINE NEGATIVE: 1
HEMATURIA: 0
HOARSE VOICE: 0
DYSPNEA ON EXERTION: 0
JOINT SWELLING: 0
DECREASED APPETITE: 0
PND: 0
WEIGHT GAIN: 0
HEARTBURN: 0
NERVOUS/ANXIOUS: 0
FREQUENCY: 0
CONSTIPATION: 0
WEIGHT LOSS: 0
WEAKNESS: 0
MYALGIAS: 0
LIGHT-HEADEDNESS: 0
PARESTHESIAS: 0
BRUISES/BLEEDS EASILY: 0
POOR WOUND HEALING: 0
DYSURIA: 0
DEPRESSION: 0
BACK PAIN: 1

## 2022-07-22 NOTE — TELEPHONE ENCOUNTER
Pt called would like a call back with recommendations for Neurologists within [facility]    Best contact
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
## 2022-08-09 ENCOUNTER — HOSPITAL ENCOUNTER (INPATIENT)
Facility: HOSPITAL | Age: 82
LOS: 1 days | Discharge: HOME | DRG: 690 | End: 2022-08-10
Attending: EMERGENCY MEDICINE | Admitting: HOSPITALIST
Payer: MEDICARE

## 2022-08-09 ENCOUNTER — APPOINTMENT (INPATIENT)
Dept: RADIOLOGY | Facility: HOSPITAL | Age: 82
DRG: 690 | End: 2022-08-09
Attending: HOSPITALIST
Payer: MEDICARE

## 2022-08-09 DIAGNOSIS — R82.90 ABNORMAL URINALYSIS: ICD-10-CM

## 2022-08-09 DIAGNOSIS — R53.83 FATIGUE, UNSPECIFIED TYPE: Primary | ICD-10-CM

## 2022-08-09 PROBLEM — N39.0 SYMPTOMATIC URINARY TRACT INFECTION: Status: ACTIVE | Noted: 2022-08-09

## 2022-08-09 LAB
ANION GAP SERPL CALC-SCNC: 8 MEQ/L (ref 3–15)
BASOPHILS # BLD: 0.02 K/UL (ref 0.01–0.1)
BASOPHILS NFR BLD: 0.2 %
BILIRUB UR QL STRIP.AUTO: NEGATIVE MG/DL
BUN SERPL-MCNC: 12 MG/DL (ref 8–20)
CALCIUM SERPL-MCNC: 9.4 MG/DL (ref 8.9–10.3)
CHLORIDE SERPL-SCNC: 105 MEQ/L (ref 98–109)
CLARITY UR REFRACT.AUTO: CLEAR
CO2 SERPL-SCNC: 24 MEQ/L (ref 22–32)
COLOR UR AUTO: YELLOW
CREAT SERPL-MCNC: 1.3 MG/DL (ref 0.8–1.3)
DIFFERENTIAL METHOD BLD: ABNORMAL
EOSINOPHIL # BLD: 0.44 K/UL (ref 0.04–0.54)
EOSINOPHIL NFR BLD: 5.3 %
ERYTHROCYTE [DISTWIDTH] IN BLOOD BY AUTOMATED COUNT: 14.5 % (ref 11.6–14.4)
GFR SERPL CREATININE-BSD FRML MDRD: 53.3 ML/MIN/1.73M*2
GLUCOSE SERPL-MCNC: 144 MG/DL (ref 70–99)
GLUCOSE UR STRIP.AUTO-MCNC: NEGATIVE MG/DL
HCT VFR BLDCO AUTO: 46.8 % (ref 40.1–51)
HGB BLD-MCNC: 15.4 G/DL (ref 13.7–17.5)
HGB UR QL STRIP.AUTO: NEGATIVE
IMM GRANULOCYTES # BLD AUTO: 0.02 K/UL (ref 0–0.08)
IMM GRANULOCYTES NFR BLD AUTO: 0.2 %
KETONES UR STRIP.AUTO-MCNC: NEGATIVE MG/DL
LEUKOCYTE ESTERASE UR QL STRIP.AUTO: NEGATIVE
LYMPHOCYTES # BLD: 1.37 K/UL (ref 1.2–3.5)
LYMPHOCYTES NFR BLD: 16.6 %
MCH RBC QN AUTO: 34 PG (ref 28–33.2)
MCHC RBC AUTO-ENTMCNC: 32.9 G/DL (ref 32.2–36.5)
MCV RBC AUTO: 103.3 FL (ref 83–98)
MONOCYTES # BLD: 1.73 K/UL (ref 0.3–1)
MONOCYTES NFR BLD: 21 %
NEUTROPHILS # BLD: 4.65 K/UL (ref 1.7–7)
NEUTS SEG NFR BLD: 56.7 %
NITRITE UR QL STRIP.AUTO: NEGATIVE
NRBC BLD-RTO: 0 %
PDW BLD AUTO: 9.1 FL (ref 9.4–12.4)
PH UR STRIP.AUTO: 6.5 [PH]
PLATELET # BLD AUTO: 171 K/UL (ref 150–350)
POTASSIUM SERPL-SCNC: 4.1 MEQ/L (ref 3.6–5.1)
PROT UR QL STRIP.AUTO: NEGATIVE
RBC # BLD AUTO: 4.53 M/UL (ref 4.5–5.8)
SARS-COV-2 RNA RESP QL NAA+PROBE: NEGATIVE
SODIUM SERPL-SCNC: 137 MEQ/L (ref 136–144)
SP GR UR REFRACT.AUTO: 1.01
UROBILINOGEN UR STRIP-ACNC: 0.2 EU/DL
WBC # BLD AUTO: 8.23 K/UL (ref 3.8–10.5)

## 2022-08-09 PROCEDURE — 80048 BASIC METABOLIC PNL TOTAL CA: CPT

## 2022-08-09 PROCEDURE — 85025 COMPLETE CBC W/AUTO DIFF WBC: CPT

## 2022-08-09 PROCEDURE — 12000000 HC ROOM AND CARE MED/SURG

## 2022-08-09 PROCEDURE — 81003 URINALYSIS AUTO W/O SCOPE: CPT | Performed by: EMERGENCY MEDICINE

## 2022-08-09 PROCEDURE — 63600000 HC DRUGS/DETAIL CODE: Performed by: HOSPITALIST

## 2022-08-09 PROCEDURE — 25000000 HC PHARMACY GENERAL: Performed by: HOSPITALIST

## 2022-08-09 PROCEDURE — 85025 COMPLETE CBC W/AUTO DIFF WBC: CPT | Performed by: EMERGENCY MEDICINE

## 2022-08-09 PROCEDURE — 80048 BASIC METABOLIC PNL TOTAL CA: CPT | Performed by: EMERGENCY MEDICINE

## 2022-08-09 PROCEDURE — 99284 EMERGENCY DEPT VISIT MOD MDM: CPT | Mod: 25

## 2022-08-09 PROCEDURE — 63700000 HC SELF-ADMINISTRABLE DRUG: Performed by: HOSPITALIST

## 2022-08-09 PROCEDURE — 1123F ACP DISCUSS/DSCN MKR DOCD: CPT | Performed by: HOSPITALIST

## 2022-08-09 PROCEDURE — 76770 US EXAM ABDO BACK WALL COMP: CPT

## 2022-08-09 PROCEDURE — U0002 COVID-19 LAB TEST NON-CDC: HCPCS | Performed by: PHYSICIAN ASSISTANT

## 2022-08-09 PROCEDURE — 36415 COLL VENOUS BLD VENIPUNCTURE: CPT

## 2022-08-09 PROCEDURE — 25800000 HC PHARMACY IV SOLUTIONS: Performed by: HOSPITALIST

## 2022-08-09 PROCEDURE — 99223 1ST HOSP IP/OBS HIGH 75: CPT | Performed by: HOSPITALIST

## 2022-08-09 RX ORDER — ALBUTEROL SULFATE 0.83 MG/ML
2.5 SOLUTION RESPIRATORY (INHALATION) EVERY 6 HOURS PRN
Status: DISCONTINUED | OUTPATIENT
Start: 2022-08-09 | End: 2022-08-10 | Stop reason: HOSPADM

## 2022-08-09 RX ORDER — VANCOMYCIN HYDROCHLORIDE
15
Status: DISCONTINUED | OUTPATIENT
Start: 2022-08-09 | End: 2022-08-09

## 2022-08-09 RX ORDER — CLOPIDOGREL BISULFATE 75 MG/1
75 TABLET ORAL DAILY
Status: DISCONTINUED | OUTPATIENT
Start: 2022-08-10 | End: 2022-08-10 | Stop reason: HOSPADM

## 2022-08-09 RX ORDER — ENOXAPARIN SODIUM 100 MG/ML
40 INJECTION SUBCUTANEOUS
Status: DISCONTINUED | OUTPATIENT
Start: 2022-08-09 | End: 2022-08-10 | Stop reason: HOSPADM

## 2022-08-09 RX ORDER — DEXTROSE 50 % IN WATER (D50W) INTRAVENOUS SYRINGE
25 AS NEEDED
Status: DISCONTINUED | OUTPATIENT
Start: 2022-08-09 | End: 2022-08-10 | Stop reason: HOSPADM

## 2022-08-09 RX ORDER — METHENAMINE, SODIUM PHOSPHATE, MONOBASIC, ANHYDROUS, PHENYL SALICYLATE, METHYLENE BLUE AND HYOSCYAMINE SULFATE 118; 40.8; 36; 10; .12 MG/1; MG/1; MG/1; MG/1; MG/1
1 CAPSULE ORAL 3 TIMES DAILY PRN
COMMUNITY
Start: 2022-08-04 | End: 2023-07-13

## 2022-08-09 RX ORDER — IBUPROFEN 200 MG
16-32 TABLET ORAL AS NEEDED
Status: DISCONTINUED | OUTPATIENT
Start: 2022-08-09 | End: 2022-08-10 | Stop reason: HOSPADM

## 2022-08-09 RX ORDER — TAMSULOSIN HYDROCHLORIDE 0.4 MG/1
0.4 CAPSULE ORAL NIGHTLY
Status: DISCONTINUED | OUTPATIENT
Start: 2022-08-09 | End: 2022-08-10 | Stop reason: HOSPADM

## 2022-08-09 RX ORDER — ATORVASTATIN CALCIUM 20 MG/1
20 TABLET, FILM COATED ORAL
Status: DISCONTINUED | OUTPATIENT
Start: 2022-08-10 | End: 2022-08-10 | Stop reason: HOSPADM

## 2022-08-09 RX ORDER — CHOLECALCIFEROL (VITAMIN D3) 25 MCG
5000 TABLET ORAL DAILY
Status: DISCONTINUED | OUTPATIENT
Start: 2022-08-10 | End: 2022-08-10 | Stop reason: HOSPADM

## 2022-08-09 RX ORDER — SODIUM CHLORIDE 9 MG/ML
INJECTION, SOLUTION INTRAVENOUS ONCE
Status: COMPLETED | OUTPATIENT
Start: 2022-08-09 | End: 2022-08-09

## 2022-08-09 RX ORDER — VANCOMYCIN/0.9 % SOD CHLORIDE 1.5G/250ML
1.5 PLASTIC BAG, INJECTION (ML) INTRAVENOUS ONCE
Status: COMPLETED | OUTPATIENT
Start: 2022-08-09 | End: 2022-08-09

## 2022-08-09 RX ORDER — DEXTROSE 40 %
15-30 GEL (GRAM) ORAL AS NEEDED
Status: DISCONTINUED | OUTPATIENT
Start: 2022-08-09 | End: 2022-08-10 | Stop reason: HOSPADM

## 2022-08-09 RX ORDER — PANTOPRAZOLE SODIUM 40 MG/1
40 TABLET, DELAYED RELEASE ORAL EVERY MORNING
Status: DISCONTINUED | OUTPATIENT
Start: 2022-08-10 | End: 2022-08-10 | Stop reason: HOSPADM

## 2022-08-09 RX ORDER — ASPIRIN 81 MG/1
81 TABLET ORAL DAILY
Status: DISCONTINUED | OUTPATIENT
Start: 2022-08-10 | End: 2022-08-10 | Stop reason: HOSPADM

## 2022-08-09 RX ORDER — LANOLIN ALCOHOL/MO/W.PET/CERES
1000 CREAM (GRAM) TOPICAL DAILY
Status: DISCONTINUED | OUTPATIENT
Start: 2022-08-10 | End: 2022-08-10 | Stop reason: HOSPADM

## 2022-08-09 RX ORDER — AMOXICILLIN 250 MG
1 CAPSULE ORAL 2 TIMES DAILY
Status: DISCONTINUED | OUTPATIENT
Start: 2022-08-09 | End: 2022-08-10 | Stop reason: HOSPADM

## 2022-08-09 RX ORDER — TAMSULOSIN HYDROCHLORIDE 0.4 MG/1
0.4 CAPSULE ORAL NIGHTLY
COMMUNITY
Start: 2022-07-31

## 2022-08-09 RX ADMIN — TAMSULOSIN HYDROCHLORIDE 0.4 MG: 0.4 CAPSULE ORAL at 22:47

## 2022-08-09 RX ADMIN — SODIUM CHLORIDE: 9 INJECTION, SOLUTION INTRAVENOUS at 16:42

## 2022-08-09 RX ADMIN — WATER 1500 MG: 1000 INJECTION, SOLUTION INTRAVENOUS at 16:41

## 2022-08-09 ASSESSMENT — ENCOUNTER SYMPTOMS
HEMATURIA: 0
VOMITING: 0
FATIGUE: 1
FEVER: 0
ABDOMINAL PAIN: 0
HEADACHES: 0
ARTHRALGIAS: 0
DYSURIA: 0
SHORTNESS OF BREATH: 0
CHILLS: 0
DIARRHEA: 0
DIZZINESS: 0
NAUSEA: 0

## 2022-08-09 ASSESSMENT — PATIENT HEALTH QUESTIONNAIRE - PHQ9: SUM OF ALL RESPONSES TO PHQ9 QUESTIONS 1 & 2: 0

## 2022-08-09 NOTE — PLAN OF CARE
Problem: Adult Inpatient Plan of Care  Goal: Plan of Care Review  Outcome: Progressing  Flowsheets (Taken 8/9/2022 1811)  Progress: no change  Plan of Care Reviewed With: patient  Outcome Summary: patient reporting minimal back and flank pain, receiving IVF and IV vanco, tolerating cardiac diet, up ad ross independent, kidney US completed, awaiting urine culture results, voiding using urinal with frequency and urgency
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
## 2022-08-09 NOTE — PROGRESS NOTES
Vancomycin Dosing by Pharmacy Consult Initiated    Ko Shearer is a 81 y.o. male who has been consulted for vancomycin dosing for  UTI  prescribed by Dr. Marcelo .    Reviewed relevant clinical data including weight, renal function, previous vancomycin doses, and vancomycin levels:  Creatinine   Date/Time Value Ref Range Status   08/09/2022 1158 1.3 0.8 - 1.3 mg/dL Final     No results found for: VANCORANDOM, VANCOTROUGH, VANCOPEAK      Vancomycin Administrations (last 96 hours)       Date/Time Action Medication Dose Rate    08/09/22 1641 New Bag    vancomycin 1.5 g/500 mL IVPB in NSS 1,500 mg 250 mL/hr            Assessment/Plan  The patient is ordered vancomycin dosing by pharmacy.    The dose will be based on:         Wt Readings from Last 1 Encounters:   08/09/22 80.7 kg (178 lb)         Estimated Creatinine Clearance: 49.1 mL/min by (C-G formula)      Pt received a loading dose  1.5 gm IV x1 and will start maintenance dose of 1000 mg IV every 24 hours.    Target a trough of 10-15 ug/mL per vancomycin dosing per pharmacy order.  Pharmacy will continue to follow the patient's vancomycin dosing daily.      Please call vancomycin levels to the pharmacy.  Susan Gudino [facility]

## 2022-08-09 NOTE — ASSESSMENT & PLAN NOTE
81-year-old male with a past medical history of prostate cancer s/p radiation therapy at outpatient visit with Dr. Belle from urology who sent UA and got called as his urine culture grew Cocuria-Kritinae and referred him to er for iv antibiotics    Feeling better today and has a history of recurrent uti in the past  Renal ultrasound WNL, no hydronephrosis  Plan    UA neg here  ID consulted, recommend Linezolid 600mg BID for 7 days  Urology following, appreciate recommendations  Received IV vancomycin while admitted, transition to linezolid as outpt for 1 week  Code status is full code  POA is his wife

## 2022-08-09 NOTE — ED ATTESTATION NOTE
I have personally seen, evaluated,and participated in the management of Ko Shearer.  I reviewed and agree with the PA/NP's assessment and plan of care with the following exceptions: None    My examination, assessment, and plan of care for Ko Shearer:  81-year-old male with history of diverticulitis stroke coronary disease kidney stones history of frequent UTIs (patient stated to have approximately 5 episodes annually) presented for urinary tract infection that was told that required IV antibiotics.  Urine was given 9 days ago and culture results were called to patient yesterday.  Patient denies any fever or shaking chills.  Has mild right lower flank discomfort without any nausea and vomiting.  Generalized malaise  Exam:   Vitals:    08/09/22 1153   BP: (!) 128/58   Pulse: 85   Resp: 18   Temp: 37.1 °C (98.8 °F)   SpO2: 94%   Awake and alert with good gait.  No respiratory distress.  Minimal right flank tenderness to percussion.  Soft nontender nondistended abdomen.  Normal complexion conjunctiva    Plan:  Labs repeat urine culture contact urology for results     David Polanco MD  08/09/22 0360
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
## 2022-08-10 VITALS
RESPIRATION RATE: 18 BRPM | HEIGHT: 71 IN | DIASTOLIC BLOOD PRESSURE: 65 MMHG | BODY MASS INDEX: 25.02 KG/M2 | SYSTOLIC BLOOD PRESSURE: 129 MMHG | OXYGEN SATURATION: 93 % | WEIGHT: 178.7 LBS | HEART RATE: 94 BPM | TEMPERATURE: 98.6 F

## 2022-08-10 LAB
ANION GAP SERPL CALC-SCNC: 7 MEQ/L (ref 3–15)
BUN SERPL-MCNC: 13 MG/DL (ref 8–20)
CALCIUM SERPL-MCNC: 9 MG/DL (ref 8.9–10.3)
CHLORIDE SERPL-SCNC: 108 MEQ/L (ref 98–109)
CO2 SERPL-SCNC: 22 MEQ/L (ref 22–32)
CREAT SERPL-MCNC: 1.2 MG/DL (ref 0.8–1.3)
ERYTHROCYTE [DISTWIDTH] IN BLOOD BY AUTOMATED COUNT: 14.2 % (ref 11.6–14.4)
GFR SERPL CREATININE-BSD FRML MDRD: 58.4 ML/MIN/1.73M*2
GLUCOSE SERPL-MCNC: 108 MG/DL (ref 70–99)
HCT VFR BLDCO AUTO: 43.2 % (ref 40.1–51)
HGB BLD-MCNC: 14.4 G/DL (ref 13.7–17.5)
MCH RBC QN AUTO: 34.2 PG (ref 28–33.2)
MCHC RBC AUTO-ENTMCNC: 33.3 G/DL (ref 32.2–36.5)
MCV RBC AUTO: 102.6 FL (ref 83–98)
PDW BLD AUTO: 9.5 FL (ref 9.4–12.4)
PLATELET # BLD AUTO: 155 K/UL (ref 150–350)
POTASSIUM SERPL-SCNC: 4.1 MEQ/L (ref 3.6–5.1)
RBC # BLD AUTO: 4.21 M/UL (ref 4.5–5.8)
SODIUM SERPL-SCNC: 137 MEQ/L (ref 136–144)
WBC # BLD AUTO: 6.83 K/UL (ref 3.8–10.5)

## 2022-08-10 PROCEDURE — 85027 COMPLETE CBC AUTOMATED: CPT | Performed by: HOSPITALIST

## 2022-08-10 PROCEDURE — 63700000 HC SELF-ADMINISTRABLE DRUG: Performed by: STUDENT IN AN ORGANIZED HEALTH CARE EDUCATION/TRAINING PROGRAM

## 2022-08-10 PROCEDURE — 63700000 HC SELF-ADMINISTRABLE DRUG: Performed by: HOSPITALIST

## 2022-08-10 PROCEDURE — 80048 BASIC METABOLIC PNL TOTAL CA: CPT | Performed by: HOSPITALIST

## 2022-08-10 PROCEDURE — 36415 COLL VENOUS BLD VENIPUNCTURE: CPT | Performed by: HOSPITALIST

## 2022-08-10 PROCEDURE — 99239 HOSP IP/OBS DSCHRG MGMT >30: CPT | Performed by: STUDENT IN AN ORGANIZED HEALTH CARE EDUCATION/TRAINING PROGRAM

## 2022-08-10 RX ORDER — ACETAMINOPHEN 325 MG/1
650 TABLET ORAL EVERY 4 HOURS PRN
Status: DISCONTINUED | OUTPATIENT
Start: 2022-08-10 | End: 2022-08-10 | Stop reason: HOSPADM

## 2022-08-10 RX ORDER — ACETAMINOPHEN 650 MG/1
650 SUPPOSITORY RECTAL EVERY 4 HOURS PRN
Status: DISCONTINUED | OUTPATIENT
Start: 2022-08-10 | End: 2022-08-10 | Stop reason: HOSPADM

## 2022-08-10 RX ORDER — LINEZOLID 600 MG/1
600 TABLET, FILM COATED ORAL 2 TIMES DAILY
Qty: 14 TABLET | Refills: 0 | Status: SHIPPED | OUTPATIENT
Start: 2022-08-10 | End: 2022-08-17

## 2022-08-10 RX ORDER — ACETAMINOPHEN 650 MG/20.3ML
650 LIQUID ORAL EVERY 4 HOURS PRN
Status: DISCONTINUED | OUTPATIENT
Start: 2022-08-10 | End: 2022-08-10 | Stop reason: HOSPADM

## 2022-08-10 RX ADMIN — Medication 5000 UNITS: at 09:13

## 2022-08-10 RX ADMIN — MULTIPLE VITAMINS W/ MINERALS TAB 1 TABLET: TAB at 09:14

## 2022-08-10 RX ADMIN — PANTOPRAZOLE SODIUM 40 MG: 40 TABLET, DELAYED RELEASE ORAL at 09:14

## 2022-08-10 RX ADMIN — CLOPIDOGREL BISULFATE 75 MG: 75 TABLET ORAL at 09:14

## 2022-08-10 RX ADMIN — ASPIRIN 81 MG: 81 TABLET, COATED ORAL at 09:14

## 2022-08-10 RX ADMIN — CYANOCOBALAMIN TAB 1000 MCG 1000 MCG: 1000 TAB at 09:14

## 2022-08-10 RX ADMIN — SENNOSIDES AND DOCUSATE SODIUM 1 TABLET: 50; 8.6 TABLET ORAL at 09:14

## 2022-08-10 RX ADMIN — ACETAMINOPHEN 650 MG: 325 TABLET ORAL at 09:13

## 2022-08-10 RX ADMIN — ATORVASTATIN CALCIUM 20 MG: 20 TABLET, FILM COATED ORAL at 06:35

## 2022-08-10 NOTE — PROGRESS NOTES
Urology Daily Progress Note    Patient seen and evaluated, agree with below.  Please reference full consult from August 9, 2022.  Patient is a 81-year-old man with a history of prostate cancer, status post radiation therapy recently completed in June of this year who was referred for evaluation of urine cultures.  The patient is complaining of some mild frequency and urgency since completing his radiation therapy.  He denies any dysuria or gross hematuria.  He denies any fevers at home.    Abdomen: Soft, nontender, no suprapubic fullness, no CVA tenderness    81-year-old male with a history of prostate cancer just completed radiation therapy admitted with urine cultures of Neela Garrison.  ?? Significance of cx resuklts??  Antibiotics as per ID  Follow up with Dr Belle as an outpatient.    Subjective     Interval History: Pt seen and examined at bedside this AM, no acute events overnight. Patient denies any fevers, chills, nausea, or vomiting. He has been voiding without any issues.       Objective     Vital signs in last 24 hours:  Temp:  [36.4 °C (97.5 °F)-37.1 °C (98.8 °F)] 36.7 °C (98.1 °F)  Heart Rate:  [69-85] 80  Resp:  [17-18] 18  BP: (110-140)/(58-76) 120/64      Intake/Output Summary (Last 24 hours) at 8/10/2022 0613  Last data filed at 8/10/2022 0453  Gross per 24 hour   Intake --   Output 700 ml   Net -700 ml     Intake/Output this shift:  I/O this shift:  In: -   Out: 700 [Urine:700]    Labs  BMP Results         08/10/22 08/09/22 02/14/22     0459 1158 1634     137 136    K 4.1 4.1 4.1    Cl 108 105 102    CO2 22 24 26    Glucose 108 144 153    BUN 13 12 13    Creatinine 1.2 1.3 1.3    Calcium 9.0 9.4 9.2    Anion Gap 7 8 8    EGFR 58.4 53.3 53.3           Comment for K at 1158 on 08/09/22: Results obtained on plasma. Plasma Potassium values may be up to 0.4 mEQ/L less than serum values. The differences may be greater for patients with high platelet or white cell counts.          CBC Results          08/10/22 08/09/22 02/14/22     0459 1158 1634    WBC 6.83 8.23 6.56    RBC 4.21 4.53 4.37    HGB 14.4 15.4 14.5    HCT 43.2 46.8 44.3    .6 103.3 101.4    MCH 34.2 34.0 33.2    MCHC 33.3 32.9 32.7     171 174          UA Results         08/09/22     1359    Color Yellow    Clarity Clear    Glucose Negative    Bilirubin Negative    Ketones Negative    Sp Grav 1.011    Blood Negative    Ph 6.5    Protein Negative    Urobilinogen 0.2    Nitrite Negative    Leuk Est Negative           Comment for Blood at 1359 on 08/09/22: The sensitivity of the occult blood test is equivalent to approximately 4 intact RBC/HPF.    Comment for Leuk Est at 1359 on 08/09/22: Results can be falsely negative due to high specific gravity, some antibiotics, glucose >3 g/dl, or WBC other than neutrophils.          Microbiology Results       Procedure Component Value Units Date/Time    SARS-CoV-2 (COVID-19), PCR Nasopharynx [[id number]]  (Normal) Collected: 08/09/22 1350    Specimen: Nasopharyngeal Swab from Nasopharynx Updated: 08/09/22 1424    Narrative:      The following orders were created for panel order SARS-CoV-2 (COVID-19), PCR Nasopharynx.  Procedure                               Abnormality         Status                     ---------                               -----------         ------                     SARS-CoV-2 (COVID-19), P...[[id number]]  Normal              Final result                 Please view results for these tests on the individual orders.    SARS-CoV-2 (COVID-19), PCR Nasopharynx [[id number]]  (Normal) Collected: 08/09/22 1350    Specimen: Nasopharyngeal Swab from Nasopharynx Updated: 08/09/22 1424     SARS-CoV-2 (COVID-19) Negative    Narrative:      Testing performed using real-time PCR for detection of COVID-19. EUA approved validation studies performed on site.               Physicial Exam  Visit Vitals  /64 (BP Location: Right upper arm, Patient Position: Lying)   Pulse 80   Temp 36.7  "°C (98.1 °F) (Oral)   Resp 18   Ht 1.803 m (5' 11\")   Wt 81.1 kg (178 lb 11.2 oz)   SpO2 96%   BMI 24.92 kg/m²     General appearance: alert, cooperative, no acute distress  Lungs: Unlabored respirations, symmetric chest expansion  Heart: regular rate and rhythm  Abdomen: soft, non-tender to palpation, non-distended  : no suprapubic or CVA tenderness   Extremities: extremities normal, warm and well-perfused; no cyanosis, clubbing, or edema and no redness or tenderness in the calves bilaterally  Neurologic: Alert and oriented X 3       Assessment     81-year-old man with a past medical history of CAD s/p CABG, HLD, stroke, chronic back pain, prostate cancer s/p radiation therapy which he just started in June.  He was referred to the emergency department by Dr. Belle for urine cultures positive for Komeloria Kristinae.        Plan     -ID consultation appreciated, patient can be discharged home on 7 days of Linezolid   - Renal ultrasound showed no hydronephrosis   -Trend creatinine and hemodynamics  - Lower urinary tract symptoms likely secondary to pelvic radiation, bladder scan only if uncomfortable and unable to void.    [facility]  Vincent Cannon DO, PGY 5  [place] Pager #[phone]  Chi Bermeo Pager #[phone]                            "

## 2022-08-10 NOTE — PROGRESS NOTES
"  Division of Infectious Diseases [phone]  Progress Note    Patient Name: Ko Shearer MR#: [id number] : 1940 Admitted 2022  Date: 08/10/22 Time: 10:26 AM Author: Napoleon Camp MD    Subjective: no dysuria    ROS: no fever, chills, sweats, diarrhea, rash    Physical Examination:  Vital signs reviewed  Temp (24hrs), Av.8 °C (98.3 °F), Min:36.4 °C (97.5 °F), Max:37.1 °C (98.8 °F)    Visit Vitals  /65 (BP Location: Right upper arm, Patient Position: Lying)   Pulse 94   Temp 37 °C (98.6 °F) (Oral)   Resp 18   Ht 1.803 m (5' 11\")   Wt 81.1 kg (178 lb 11.2 oz)   SpO2 93%   BMI 24.92 kg/m²     General: no respiratory distress   Neurologic: alert  Oropharynx: moist mucous membranes  Neck: supple, trachea midline  Heart: regular rate, regular rhythm, normal S1, normal S2  Lungs: clear to auscultation bilaterally  Abdomen: soft, normal bowel sounds, no distension, no mass, no tenderness  Musculoskeletal: no deformity  Skin: no rash    Recent Results (from the past 24 hour(s))   CBC and differential    Collection Time: 22 11:58 AM   Result Value Ref Range    WBC 8.23 3.80 - 10.50 K/uL    RBC 4.53 4.50 - 5.80 M/uL    Hemoglobin 15.4 13.7 - 17.5 g/dL    Hematocrit 46.8 40.1 - 51.0 %    .3 (H) 83.0 - 98.0 fL    MCH 34.0 (H) 28.0 - 33.2 pg    MCHC 32.9 32.2 - 36.5 g/dL    RDW 14.5 (H) 11.6 - 14.4 %    Platelets 171 150 - 350 K/uL    MPV 9.1 (L) 9.4 - 12.4 fL    Differential Type Auto     nRBC 0.0 <=0.0 %    Immature Granulocytes 0.2 %    Neutrophils 56.7 %    Lymphocytes 16.6 %    Monocytes 21.0 %    Eosinophils 5.3 %    Basophils 0.2 %    Immature Granulocytes, Absolute 0.02 0.00 - 0.08 K/uL    Neutrophils, Absolute 4.65 1.70 - 7.00 K/uL    Lymphocytes, Absolute 1.37 1.20 - 3.50 K/uL    Monocytes, Absolute 1.73 (H) 0.30 - 1.00 K/uL    Eosinophils, Absolute 0.44 0.04 - 0.54 K/uL    Basophils, Absolute 0.02 0.01 - 0.10 K/uL   Basic metabolic panel    Collection Time: 22 " 11:58 AM   Result Value Ref Range    Sodium 137 136 - 144 mEQ/L    Potassium 4.1 3.6 - 5.1 mEQ/L    Chloride 105 98 - 109 mEQ/L    CO2 24 22 - 32 mEQ/L    BUN 12 8 - 20 mg/dL    Creatinine 1.3 0.8 - 1.3 mg/dL    Glucose 144 (H) 70 - 99 mg/dL    Calcium 9.4 8.9 - 10.3 mg/dL    eGFR 53.3 (L) >=60.0 mL/min/1.73m*2    Anion Gap 8 3 - 15 mEQ/L   SARS-CoV-2 (COVID-19), PCR Nasopharynx    Collection Time: 08/09/22  1:50 PM    Specimen: Nasopharynx; Nasopharyngeal Swab   Result Value Ref Range    SARS-CoV-2 (COVID-19) Negative Negative   UA Reflex to Culture (Macroscopic)    Collection Time: 08/09/22  1:59 PM    Specimen: Urine, Clean Catch   Result Value Ref Range    Color, Urine Yellow Yellow, Colorless    Clarity, Urine Clear Clear    Specific Gravity, Urine 1.011 1.005 - 1.030    pH, Urine 6.5 4.5 - 8.0    Leukocyte Esterase Negative Negative    Nitrite, Urine Negative Negative    Protein, Urine Negative Negative    Glucose, Urine Negative Negative mg/dL    Ketones, Urine Negative Negative mg/dL    Urobilinogen, Urine 0.2 <2.0 EU/dL EU/dL    Bilirubin, Urine Negative Negative mg/dL    Blood, Urine Negative Negative   Basic metabolic panel    Collection Time: 08/10/22  4:59 AM   Result Value Ref Range    Sodium 137 136 - 144 mEQ/L    Potassium 4.1 3.6 - 5.1 mEQ/L    Chloride 108 98 - 109 mEQ/L    CO2 22 22 - 32 mEQ/L    BUN 13 8 - 20 mg/dL    Creatinine 1.2 0.8 - 1.3 mg/dL    Glucose 108 (H) 70 - 99 mg/dL    Calcium 9.0 8.9 - 10.3 mg/dL    eGFR 58.4 (L) >=60.0 mL/min/1.73m*2    Anion Gap 7 3 - 15 mEQ/L   CBC    Collection Time: 08/10/22  4:59 AM   Result Value Ref Range    WBC 6.83 3.80 - 10.50 K/uL    RBC 4.21 (L) 4.50 - 5.80 M/uL    Hemoglobin 14.4 13.7 - 17.5 g/dL    Hematocrit 43.2 40.1 - 51.0 %    .6 (H) 83.0 - 98.0 fL    MCH 34.2 (H) 28.0 - 33.2 pg    MCHC 33.3 32.2 - 36.5 g/dL    RDW 14.2 11.6 - 14.4 %    Platelets 155 150 - 350 K/uL    MPV 9.5 9.4 - 12.4 fL     Microbiology Results       Procedure  "Component Value Units Date/Time    SARS-CoV-2 (COVID-19), PCR Nasopharynx [[id number]]  (Normal) Collected: 08/09/22 1350    Specimen: Nasopharyngeal Swab from Nasopharynx Updated: 08/09/22 1424    Narrative:      The following orders were created for panel order SARS-CoV-2 (COVID-19), PCR Nasopharynx.  Procedure                               Abnormality         Status                     ---------                               -----------         ------                     SARS-CoV-2 (COVID-19), P...[[id number]]  Normal              Final result                 Please view results for these tests on the individual orders.    SARS-CoV-2 (COVID-19), PCR Nasopharynx [[id number]]  (Normal) Collected: 08/09/22 1350    Specimen: Nasopharyngeal Swab from Nasopharynx Updated: 08/09/22 1424     SARS-CoV-2 (COVID-19) Negative    Narrative:      Testing performed using real-time PCR for detection of COVID-19. EUA approved validation studies performed on site.           Anti-infectives (From admission, onward)      Start     Dose/Rate Route Frequency Ordered Stop    08/10/22 1700  vancomycin 1 g/250 mL IVPB in NSS        \"And\" Linked Group Details    1,000 mg  166.7 mL/hr over 90 Minutes intravenous Every 24 hours interval 08/09/22 1633              Assessment:   Bacteriuria, rule out acute cystitis, tolerating antimicrobial therapy     Plan:   continue vancomycin while inpatient    suggested Rx = linezolid 600 mg PO BID for seven days    Napoleon Camp MD  8/10/2022 10:26 AM    "

## 2022-08-10 NOTE — UM PHYSICIAN REVIEW NOTE
Utilization Secondary Review Note      Patient Name: Ko Shearer      MRN: [id number]  Insurance: MEDICARE  Admission date: 8/9/2022  Initial order:    Inpatient  Planned admission: No  Post Discharge Review: Yes            Outpatient services are appropriate for this 81 y.o. year old patient who presents with acute cystitis treated with IV abx and discharged after 1MN on oral abx.     Will need 2nd reviewer.     Alok Gonzales MD  8/10/2022

## 2022-08-10 NOTE — PLAN OF CARE
Problem: Adult Inpatient Plan of Care  Goal: Readiness for Transition of Care  Outcome: Progressing  Intervention: Mutually Develop Transition Plan  Flowsheets (Taken 8/10/2022 1055)  Anticipated Discharge Disposition: home without assistance or services  Equipment Needed After Discharge: none  Assistive Device/Animal Currently Used at Home: none  Anticipated Changes Related to Illness: none  Transportation Concerns: car, none  Readmission Within the Last 30 Days: no previous admission in last 30 days  Patient/Family Anticipated Services at Transition: none  Patient/Family Anticipates Transition to: home with family  Transportation Anticipated: family or friend will provide  Concerns to be Addressed:   no discharge needs identified   denies needs/concerns at this time   care coordination/care conferences    Spoke with pt who confirmed demographics, PCP and pref pharms. He lives with his wife in a ranch home, 8 steps to enter. There is a tub shower. He is ind at baseline, he amb w/o dme. When dc'd wife will drive him home.    Dispo: home, no needs  Trans: wife

## 2022-08-10 NOTE — PATIENT CARE CONFERENCE
Care Progression Rounds Note  Date: 8/10/2022  Time: 10:39 AM     Patient Name: Ko Shearer     Medical Record Number: [id number]   YOB: 1940  Sex: Male      Room/Bed: 4428    Admitting Diagnosis: Abnormal urinalysis [R82.90]  Symptomatic urinary tract infection [N39.0]  Fatigue, unspecified type [R53.83]   Admit Date/Time: 8/9/2022  1:23 PM    Primary Diagnosis: Symptomatic urinary tract infection  Principal Problem: Symptomatic urinary tract infection    GMLOS: pending  Anticipated Discharge Date: 8/10/2022    AM-PAC:  Mobility Score:      Discharge Planning:  Anticipated Discharge Disposition: home without assistance or services    Barriers to Discharge:  None    Comments:  DC home with PO ABs    Participants:  advanced practice provider, , dietitian/nutrition services, nursing, occupational therapy

## 2022-08-10 NOTE — UM PHYSICIAN REVIEW NOTE
Utilization Secondary Review Note      Patient Name: Ko Shearer      MRN: [id number]  Insurance: MEDICARE  Admission date: 8/9/2022  Initial order:    Inpatient  Planned admission: No  Post Discharge Review: Yes            Outpatient services are appropriate for this 81 y.o. male with 1 midnight hospital stay for symptomatic UTI.     Mari Guthrie,   8/10/2022

## 2022-08-10 NOTE — HOSPITAL COURSE
Edy is a 81 y.o. male admitted on 8/9/2022 with Abnormal urinalysis [R82.90]  Symptomatic urinary tract infection [N39.0]  Fatigue, unspecified type [R53.83]. Principal problem is Symptomatic urinary tract infection.    Past Medical History  Edy has a past medical history of Arthritis, Asthma, BPH (benign prostatic hyperplasia), Coronary artery disease, Diverticulitis of colon, Esophageal abnormality, Esophageal stricture, Frequent UTI, GERD (gastroesophageal reflux disease), H/O migraine, Kidney stones (01/2020), Lipid disorder, Lumbar stenosis, Skin cancer, Skin cancer, SOB (shortness of breath), Stroke (CMS/HCC) (2014), and Thoracic spine fracture (CMS/[facility]).    History of Present Illness   Patient was called in last night saying that his urine culture is abnormal and was referred to the hospital for IV antibiotics.

## 2022-08-10 NOTE — DISCHARGE SUMMARY
Hospital Medicine Service -  Inpatient Discharge Summary        BRIEF OVERVIEW   Admitting Provider: Beto Singh MD  Attending Provider: Ashutosh Marcelo, * Attending phys phone: [phone]    PCP: Rainer Schuler [phone]    Admission Date: 8/9/2022  Discharge Date: 8/10/2022     DISCHARGE DIAGNOSES      Primary Discharge Diagnosis  Symptomatic urinary tract infection    Secondary Discharge Diagnoses  Active Hospital Problems    Diagnosis Date Noted   • Symptomatic urinary tract infection 08/09/2022   • History of CVA (cerebrovascular accident) 08/17/2021   • BPH (benign prostatic hyperplasia) 08/17/2021   • S/P CABG (coronary artery bypass graft) 04/16/2018   • Dyslipidemia 09/27/2012   • CAD in native artery 09/27/2012      Resolved Hospital Problems   No resolved problems to display.       Problem List on Day of Discharge  BPH (benign prostatic hyperplasia)  Assessment & Plan  Continue Flomax    History of CVA (cerebrovascular accident)  Assessment & Plan  On aspirin, plavix and statin    S/P CABG (coronary artery bypass graft)  Assessment & Plan  Continue asa,plavix and  statin    Dyslipidemia  Assessment & Plan  Continue statin    CAD in native artery  Assessment & Plan  Hx of CABG  On aspirin, plavix and statin    * Symptomatic urinary tract infection  Assessment & Plan  81-year-old male with a past medical history of prostate cancer s/p radiation therapy at outpatient visit with Dr. Belle from urology who sent UA and got called as his urine culture grew Cocuria-Kritinae and referred him to er for iv antibiotics    Feeling better today and has a history of recurrent uti in the past  Renal ultrasound WNL, no hydronephrosis  Plan    UA neg here  ID consulted, recommend Linezolid 600mg BID for 7 days  Urology following, appreciate recommendations  Received IV vancomycin while admitted, transition to linezolid as outpt for 1 week  Code status is full code  POA is his  wife      SUMMARY OF HOSPITALIZATION      Presenting Problem/History of Present Illness  This is a 81 y.o. year-old male admitted on 8/9/2022 with Abnormal urinalysis [R82.90]  Symptomatic urinary tract infection [N39.0]  Fatigue, unspecified type [R53.83].    From HPI:    Ko Shearer is a 81 y.o. male with a past medical history of coronary artery disease status post CABG, hyperlipidemia, stroke, chronic back pain, prostate cancer s/p radiation therapy which she finished in June went to see Dr. Belle as a follow-up visit last Wednesday and had urine analysis done.  Patient was called in last night saying that his urine culture is abnormal and was referred to the hospital for IV antibiotics     Patient states that he has a history of recurrent urinary tract infection almost 3 times a year and has been feeling crappy recently more so today.  Patient has urinary frequency and pain since he started radiation therapy.  He denies any blood in the urine.  He denies any fever.  He says he has been having a lot of sinus issues for the last few days.  He also complains of some lower back pain more than the flank pain.     Patient is sent to the ER and the wife is at bedside who assisted with the history.  Patient denies any cough or expectoration patient denies any chest pain or shortness of breath     Patient repeat urine analysis in the ER looked normal and his white cell count is 8.23 and patient is currently waiting for a renal ultrasound.  His BMP is within normal limits except for a creatinine of 1.3 and a glucose of 144    Hospital Course    Was seen by ID, recommended switching to PO linezolid 600mg BID for 7 days. Renal ultrasound showed no hydronephrosis. Evaluated by urology, to follow up outpatient.     Exam on Day of Discharge  Physical Exam    GENERAL APPEARANCE Well developed, well nourished, AAOx3, NAD   MUCUS MEMBRANES Moist   LUNGS CTAB, no w/r/r, no signs of respiratory distress   CHEST S1/S2, RRR,  no m/r/g appreciated   ABDOMEN  GENITOURINARY Soft, NT, ND, +BS  No Patrick, no suprapubic TTP   EXTREMITIES GORDON x4, muscle strength 5/5   SKIN No obvious rash   HEENT  NEURO Pupils equal, Anicteric  Follows commands, no dysarthria or facial asymmetry          Consults During Admission  IP CONSULT TO INFECTIOUS DISEASE  IP CONSULT TO UROLOGY    DISCHARGE MEDICATIONS               Medication List      START taking these medications    linezolid 600 mg tablet  Commonly known as: ZYVOX  Take 1 tablet (600 mg total) by mouth 2 (two) times a day for 7 days.  Dose: 600 mg        CONTINUE taking these medications    aspirin 81 mg enteric coated tablet  Take 81 mg by mouth daily.  Dose: 81 mg     atorvastatin 20 mg tablet  Commonly known as: LIPITOR  TAKE 1 TABLET BY MOUTH DAILY     cholecalciferol (vitamin D3) 25 mcg (1,000 unit) capsule  Take 4 capsules by mouth daily.  Dose: 4 capsule     clopidogreL 75 mg tablet  Commonly known as: PLAVIX  TAKE 1 TABLET BY MOUTH DAILY.  [place] ONLY. GENERIC EQUIVALENT FOR PLAVIX.     cyanocobalamin 1,000 mcg tablet  Commonly known as: VITAMIN B12  Take 1,000 mcg by mouth daily.  Dose: 1,000 mcg     magnesium chloride 64 mg tablet,delayed release (DR/EC)  Take 250 mg by mouth every other day.  Dose: 250 mg     multivitamin capsule  Take 1 capsule by mouth 2 (two) times a day.  Dose: 1 capsule     pantoprazole 40 mg EC tablet  Commonly known as: PROTONIX  Take 40 mg by mouth every morning.  Dose: 40 mg     tamsulosin 0.4 mg capsule  Commonly known as: FLOMAX  Take 0.4 mg by mouth nightly.  Dose: 0.4 mg     URO--10-40.8-36 mg capsule  Take 1 capsule by mouth 3 (three) times a day as needed.  Dose: 1 capsule  Generic drug: wil-m.blue-s.phos-phsal-hyoscyamine                    PROCEDURES / LABS / IMAGING      Operative Procedures  None    Pertinent Labs  Results from last 7 days   Lab Units 08/10/22  0459 08/09/22  1158   SODIUM mEQ/L 137 137   POTASSIUM mEQ/L 4.1 4.1    CHLORIDE mEQ/L 108 105   CO2 mEQ/L 22 24   BUN mg/dL 13 12   CREATININE mg/dL 1.2 1.3   GLUCOSE mg/dL 108* 144*   CALCIUM mg/dL 9.0 9.4     Results from last 7 days   Lab Units 08/10/22  0459 08/09/22  1158   WBC K/uL 6.83 8.23   HEMOGLOBIN g/dL 14.4 15.4   HEMATOCRIT % 43.2 46.8   PLATELETS K/uL 155 171         SARS-CoV-2 (COVID-19) (no units)   Date/Time Value   08/09/2022 1350 Negative       Pertinent Imaging  ULTRASOUND KIDNEYS / BLADDER    Result Date: 8/9/2022  IMPRESSION: 1.  Postvoid residual of 173 cc. 2.  Foci of twinkle artifact within both kidneys which are nonspecific though could represent renal calculi. Stone search CT could be performed for further evaluation if clinically indicated. 3.  Stable mildly complex left renal cyst. 4.  Mild prostatomegaly.      OUTPATIENT  FOLLOW-UP / REFERRALS / PENDING TESTS        Outpatient Follow-Up Appointments            In 5 months Manjeet Salter MD [facility] in [place]          Referrals  No orders of the defined types were placed in this encounter.      Test Results Pending at Discharge  Unresulted Labs (From admission, onward)            None          Important Issues to Address in Follow-Up  Follow up with urology as outpatient    DISCHARGE DISPOSITION AND DESTINATION      Disposition: Home   Destination:  Home                            Code Status At Discharge: Full Code    Physician Order for Life-Sustaining Treatment Document Status      No documents found

## 2022-10-18 RX ORDER — ATORVASTATIN CALCIUM 20 MG/1
20 TABLET, FILM COATED ORAL
Qty: 90 TABLET | Refills: 3 | Status: SHIPPED | OUTPATIENT
Start: 2022-10-18 | End: 2024-02-02 | Stop reason: SDUPTHER

## 2023-01-27 ENCOUNTER — OFFICE VISIT (OUTPATIENT)
Dept: CARDIOLOGY | Facility: CLINIC | Age: 83
End: 2023-01-27
Payer: MEDICARE

## 2023-01-27 VITALS
BODY MASS INDEX: 27.1 KG/M2 | OXYGEN SATURATION: 96 % | HEART RATE: 70 BPM | DIASTOLIC BLOOD PRESSURE: 62 MMHG | HEIGHT: 71 IN | WEIGHT: 193.6 LBS | RESPIRATION RATE: 16 BRPM | SYSTOLIC BLOOD PRESSURE: 126 MMHG

## 2023-01-27 DIAGNOSIS — Z95.1 S/P CABG (CORONARY ARTERY BYPASS GRAFT): Chronic | ICD-10-CM

## 2023-01-27 DIAGNOSIS — E78.5 DYSLIPIDEMIA: Chronic | ICD-10-CM

## 2023-01-27 DIAGNOSIS — I25.10 CAD IN NATIVE ARTERY: Primary | ICD-10-CM

## 2023-01-27 DIAGNOSIS — I63.30 CEREBRAL INFARCTION DUE TO THROMBOSIS OF CEREBRAL ARTERY (CMS/HCC): ICD-10-CM

## 2023-01-27 PROCEDURE — 93000 ELECTROCARDIOGRAM COMPLETE: CPT | Performed by: INTERNAL MEDICINE

## 2023-01-27 PROCEDURE — 99213 OFFICE O/P EST LOW 20 MIN: CPT | Performed by: INTERNAL MEDICINE

## 2023-01-27 ASSESSMENT — ENCOUNTER SYMPTOMS
DYSURIA: 0
NERVOUS/ANXIOUS: 0
WEIGHT LOSS: 0
DIZZINESS: 0
DECREASED APPETITE: 0
DEPRESSION: 0
WHEEZING: 0
HOARSE VOICE: 0
BACK PAIN: 0
JOINT SWELLING: 0
PARESTHESIAS: 0
BLURRED VISION: 0
WEIGHT GAIN: 0
LIGHT-HEADEDNESS: 0
ENDOCRINE NEGATIVE: 1
MYALGIAS: 0
ORTHOPNEA: 0
WEAKNESS: 0
HEARTBURN: 0
ALLERGIC/IMMUNOLOGIC NEGATIVE: 1
FREQUENCY: 0
BLOATING: 0
CHANGE IN BOWEL HABIT: 0
DYSPNEA ON EXERTION: 0
COUGH: 1
ABDOMINAL PAIN: 0
POOR WOUND HEALING: 0
HEMATURIA: 0
BRUISES/BLEEDS EASILY: 0
SHORTNESS OF BREATH: 0
PND: 0
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
## 2023-02-21 ENCOUNTER — APPOINTMENT (OUTPATIENT)
Dept: LAB | Facility: CLINIC | Age: 83
End: 2023-02-21
Attending: UROLOGY
Payer: MEDICARE

## 2023-02-21 ENCOUNTER — TRANSCRIBE ORDERS (OUTPATIENT)
Dept: LAB | Facility: CLINIC | Age: 83
End: 2023-02-21

## 2023-02-21 DIAGNOSIS — C61 MALIGNANT NEOPLASM OF PROSTATE (CMS/HCC): ICD-10-CM

## 2023-02-21 DIAGNOSIS — C61 MALIGNANT NEOPLASM OF PROSTATE (CMS/HCC): Primary | ICD-10-CM

## 2023-02-21 LAB — PSA SERPL-MCNC: 1.85 NG/ML

## 2023-02-21 PROCEDURE — 84153 ASSAY OF PSA TOTAL: CPT

## 2023-02-21 PROCEDURE — 36415 COLL VENOUS BLD VENIPUNCTURE: CPT

## 2023-06-28 ENCOUNTER — TRANSCRIBE ORDERS (OUTPATIENT)
Dept: RADIOLOGY | Facility: CLINIC | Age: 83
End: 2023-06-28

## 2023-06-28 ENCOUNTER — HOSPITAL ENCOUNTER (OUTPATIENT)
Dept: RADIOLOGY | Facility: CLINIC | Age: 83
Discharge: HOME | End: 2023-06-28
Attending: FAMILY MEDICINE
Payer: MEDICARE

## 2023-06-28 DIAGNOSIS — R05.9 COUGH, UNSPECIFIED: ICD-10-CM

## 2023-06-28 DIAGNOSIS — R05.9 COUGH, UNSPECIFIED: Primary | ICD-10-CM

## 2023-06-28 PROCEDURE — 71046 X-RAY EXAM CHEST 2 VIEWS: CPT

## 2023-06-29 ENCOUNTER — TELEPHONE (OUTPATIENT)
Dept: SCHEDULING | Facility: CLINIC | Age: 83
End: 2023-06-29
Payer: MEDICARE

## 2023-06-29 RX ORDER — CLOPIDOGREL BISULFATE 75 MG/1
75 TABLET ORAL DAILY
Qty: 90 TABLET | Refills: 3 | Status: SHIPPED | OUTPATIENT
Start: 2023-06-29 | End: 2023-07-05 | Stop reason: SDUPTHER

## 2023-06-29 NOTE — TELEPHONE ENCOUNTER
MEDICATION REQUEST    Patient's wife Jessica calling to get a script sent to [facility] mail order for     Clopidogrel 75mg - 1 tablet per day  Apotex  Only    Patient has 4 pills left    I did not see if chart.

## 2023-06-29 NOTE — TELEPHONE ENCOUNTER
Reordered per discussion with MARIA LUISA Beltran and Dr. Salter.    Called patient's spouse and let her know that it has been reordered as requested.

## 2023-06-29 NOTE — TELEPHONE ENCOUNTER
"DR ALONSO PT req refill PLAVIX    Last OV 1/27/2023 shows as discontinued notes state \"He remains on antiplatelet therapy with aspirin.\"    Please advise    Thank you  "

## 2023-06-29 NOTE — TELEPHONE ENCOUNTER
I just spoke to Dr. Salter.  We can renew the patient's Plavix given his vascular disease.  Thank you

## 2023-07-05 RX ORDER — CLOPIDOGREL BISULFATE 75 MG/1
75 TABLET ORAL DAILY
Qty: 90 TABLET | Refills: 3 | Status: SHIPPED | OUTPATIENT
Start: 2023-07-05 | End: 2024-02-02 | Stop reason: SDUPTHER

## 2023-07-05 NOTE — TELEPHONE ENCOUNTER
Wife states [facility] no longer accepts Apotex manufactor only, wife was advised to get a new prescription for Plavix sent to [id number] stating on script apotex  only, and states they will be able to provide that here    [phone]

## 2023-07-11 NOTE — TELEPHONE ENCOUNTER
Pt's spouse, Jessica called and say the pt is very concern with his health and wants to come in as soon as possible to see Dr Salter     Please advise     Jessica can be reached at [phone] or [phone]

## 2023-07-13 ENCOUNTER — OFFICE VISIT (OUTPATIENT)
Dept: CARDIOLOGY | Facility: CLINIC | Age: 83
End: 2023-07-13
Payer: MEDICARE

## 2023-07-13 VITALS
DIASTOLIC BLOOD PRESSURE: 62 MMHG | OXYGEN SATURATION: 97 % | BODY MASS INDEX: 25.2 KG/M2 | HEIGHT: 71 IN | RESPIRATION RATE: 18 BRPM | HEART RATE: 67 BPM | WEIGHT: 180 LBS | SYSTOLIC BLOOD PRESSURE: 115 MMHG

## 2023-07-13 DIAGNOSIS — I63.30 CEREBRAL INFARCTION DUE TO THROMBOSIS OF CEREBRAL ARTERY (CMS/HCC): ICD-10-CM

## 2023-07-13 DIAGNOSIS — G45.1 HEMISPHERIC CAROTID ARTERY SYNDROME: Chronic | ICD-10-CM

## 2023-07-13 DIAGNOSIS — E78.5 DYSLIPIDEMIA: Chronic | ICD-10-CM

## 2023-07-13 DIAGNOSIS — Z95.1 S/P CABG (CORONARY ARTERY BYPASS GRAFT): Primary | Chronic | ICD-10-CM

## 2023-07-13 PROCEDURE — 99215 OFFICE O/P EST HI 40 MIN: CPT | Performed by: INTERNAL MEDICINE

## 2023-07-13 PROCEDURE — 93000 ELECTROCARDIOGRAM COMPLETE: CPT | Performed by: INTERNAL MEDICINE

## 2023-07-13 ASSESSMENT — ENCOUNTER SYMPTOMS
SHORTNESS OF BREATH: 1
DECREASED APPETITE: 1
MYALGIAS: 0
BLOATING: 1
PND: 0
WEIGHT LOSS: 0
COUGH: 1
NERVOUS/ANXIOUS: 0
DYSURIA: 0
CHANGE IN BOWEL HABIT: 1
BACK PAIN: 0
WHEEZING: 0
ABDOMINAL PAIN: 1
HEMATURIA: 0
DIZZINESS: 0
BRUISES/BLEEDS EASILY: 0
PARESTHESIAS: 0
WEIGHT GAIN: 0
ALLERGIC/IMMUNOLOGIC NEGATIVE: 1
DYSPNEA ON EXERTION: 1
JOINT SWELLING: 0
HOARSE VOICE: 0
DEPRESSION: 0
LIGHT-HEADEDNESS: 0
POOR WOUND HEALING: 0
BLURRED VISION: 0
WEAKNESS: 0
FREQUENCY: 0
ENDOCRINE NEGATIVE: 1
ORTHOPNEA: 0
HEARTBURN: 0
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
## 2023-07-14 ENCOUNTER — TELEPHONE (OUTPATIENT)
Dept: SCHEDULING | Facility: CLINIC | Age: 83
End: 2023-07-14
Payer: MEDICARE

## 2023-07-14 NOTE — TELEPHONE ENCOUNTER
Pt's spouse Jessica called, wants to change the location of the pt's 7/17 stress tests.    Jessica wants both tests locations changed to [facility].    Jessica can be reached at [phone]

## 2023-07-17 ENCOUNTER — HOSPITAL ENCOUNTER (OUTPATIENT)
Dept: CARDIOLOGY | Facility: CLINIC | Age: 83
End: 2023-07-17
Attending: INTERNAL MEDICINE
Payer: MEDICARE

## 2023-07-17 ENCOUNTER — TELEPHONE (OUTPATIENT)
Dept: SCHEDULING | Facility: CLINIC | Age: 83
End: 2023-07-17
Payer: MEDICARE

## 2023-07-17 NOTE — TELEPHONE ENCOUNTER
Spoke with the patents Wife and she would like to Have Testing Performed at [facility] Only.   Pt does not want to have testing performed at [place] [facility].     Provided the patients Wife will Cardiac Imaging phone Number.     Canceling today's appts for Nuc Med testing at [place].     Sending To [facility] to assist with Scheduling.     TY

## 2023-07-19 ENCOUNTER — TELEPHONE (OUTPATIENT)
Dept: CARDIOLOGY | Facility: HOSPITAL | Age: 83
End: 2023-07-19
Payer: MEDICARE

## 2023-07-21 ENCOUNTER — HOSPITAL ENCOUNTER (OUTPATIENT)
Dept: CARDIOLOGY | Facility: HOSPITAL | Age: 83
Discharge: HOME | End: 2023-07-21
Attending: INTERNAL MEDICINE
Payer: MEDICARE

## 2023-07-21 VITALS
DIASTOLIC BLOOD PRESSURE: 65 MMHG | HEIGHT: 70 IN | SYSTOLIC BLOOD PRESSURE: 126 MMHG | WEIGHT: 175 LBS | BODY MASS INDEX: 25.05 KG/M2 | HEART RATE: 54 BPM | OXYGEN SATURATION: 96 %

## 2023-07-21 VITALS — HEART RATE: 61 BPM | SYSTOLIC BLOOD PRESSURE: 130 MMHG | DIASTOLIC BLOOD PRESSURE: 64 MMHG

## 2023-07-21 DIAGNOSIS — Z95.1 S/P CABG (CORONARY ARTERY BYPASS GRAFT): Chronic | ICD-10-CM

## 2023-07-21 DIAGNOSIS — G45.1 HEMISPHERIC CAROTID ARTERY SYNDROME: Chronic | ICD-10-CM

## 2023-07-21 DIAGNOSIS — E78.5 DYSLIPIDEMIA: Chronic | ICD-10-CM

## 2023-07-21 DIAGNOSIS — I63.30 CEREBRAL INFARCTION DUE TO THROMBOSIS OF CEREBRAL ARTERY (CMS/HCC): ICD-10-CM

## 2023-07-21 LAB
CV NM TETROFOSMIN REST DOSE: 10.6 MCI
CV NM TETROFOSMIN STRESS DOSE: 30.1 MCI
MLH CV NM REST ADMIN DATE: NORMAL MM/DD/YYYY
MLH CV NM REST ADMIN TIME: NORMAL HR:MIN
MLH CV NM STRESS ADMIN DATE: NORMAL MM/DD/YYYY
MLH CV NM STRESS ADMIN TIME: NORMAL HR:MIN
STRESS BASELINE BP: NORMAL MMHG
STRESS BASELINE HR: 61 BPM
STRESS O2 SAT REST: 96 %
STRESS PERCENT HR: 96 %
STRESS POST ESTIMATED WORKLOAD: 10.1 METS
STRESS POST EXERCISE DUR MIN: 7 MIN
STRESS POST EXERCISE DUR SEC: 56 SEC
STRESS POST PEAK BP: NORMAL MMHG
STRESS POST PEAK HR: 134 BPM
STRESS TARGET HR: 119 BPM

## 2023-07-21 PROCEDURE — 93016 CV STRESS TEST SUPVJ ONLY: CPT | Performed by: INTERNAL MEDICINE

## 2023-07-21 PROCEDURE — A9502 TC99M TETROFOSMIN: HCPCS | Performed by: INTERNAL MEDICINE

## 2023-07-21 PROCEDURE — 93018 CV STRESS TEST I&R ONLY: CPT | Performed by: INTERNAL MEDICINE

## 2023-07-21 PROCEDURE — 78452 HT MUSCLE IMAGE SPECT MULT: CPT

## 2023-07-21 PROCEDURE — 78452 HT MUSCLE IMAGE SPECT MULT: CPT | Mod: 26 | Performed by: INTERNAL MEDICINE

## 2023-07-21 RX ADMIN — TETROFOSMIN 30.1 MILLICURIE: 1.38 INJECTION, POWDER, LYOPHILIZED, FOR SOLUTION INTRAVENOUS at 10:05

## 2023-07-21 RX ADMIN — TETROFOSMIN 10.6 MILLICURIE: 1.38 INJECTION, POWDER, LYOPHILIZED, FOR SOLUTION INTRAVENOUS at 08:30

## 2023-07-25 ENCOUNTER — TELEPHONE (OUTPATIENT)
Dept: CARDIOLOGY | Facility: CLINIC | Age: 83
End: 2023-07-25
Payer: MEDICARE

## 2023-07-25 DIAGNOSIS — I25.10 CAD IN NATIVE ARTERY: ICD-10-CM

## 2023-07-25 DIAGNOSIS — I77.89 ENLARGED THORACIC AORTA (CMS/HCC): Primary | ICD-10-CM

## 2023-07-25 NOTE — TELEPHONE ENCOUNTER
I called the patient and left a message on his machine, voice identified reviewing his stress test.  No obvious ischemia.  In the past he did need a stress test prior to getting his [employer] license renewed.  I told the patient to call me back if he has forms that need to be filled out.

## 2023-07-25 NOTE — TELEPHONE ENCOUNTER
Called and spoke with patient.    I relayed the message with results as noted by MARIA LUISA Beltran.  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    Patient did want me to let Jennifer and Dr. Salter now that he is currently having some extreme fatigue, feels like he can go to sleep at any moment.  Has had occasional dizzy spells and headaches.    He states that it had occurred prior to the stress test, but has gotten worse.  I confirmed that no medication changes or lifestyle changes have been made since beginning of symptoms.  I advised patient to reach out to primary care physician to see if they can work-up other potential causes.  I let him know that I would forward to Jennifer and Dr. Salter and we would call back with any additional recommendations

## 2023-07-25 NOTE — TELEPHONE ENCOUNTER
----- Message from Manjeet Salter MD sent at 7/24/2023  6:56 AM EDT -----  TP  Please call patient with result.    Thank you.    pmc

## 2023-07-26 NOTE — TELEPHONE ENCOUNTER
I called the patient and instructed him to follow-up with his primary care physician.  He verbalized understanding.

## 2023-07-28 ENCOUNTER — TRANSCRIBE ORDERS (OUTPATIENT)
Dept: SCHEDULING | Age: 83
End: 2023-07-28

## 2023-07-28 DIAGNOSIS — J44.9 CHRONIC OBSTRUCTIVE PULMONARY DISEASE, UNSPECIFIED: Primary | ICD-10-CM

## 2023-08-01 ENCOUNTER — HOSPITAL ENCOUNTER (OUTPATIENT)
Dept: RADIOLOGY | Facility: HOSPITAL | Age: 83
Discharge: HOME | End: 2023-08-01
Attending: FAMILY MEDICINE
Payer: MEDICARE

## 2023-08-01 DIAGNOSIS — J44.9 CHRONIC OBSTRUCTIVE PULMONARY DISEASE, UNSPECIFIED: ICD-10-CM

## 2023-08-01 PROCEDURE — 71250 CT THORAX DX C-: CPT

## 2023-08-02 PROBLEM — I77.89 ENLARGED THORACIC AORTA (CMS/HCC): Status: ACTIVE | Noted: 2023-08-02

## 2023-08-02 NOTE — TELEPHONE ENCOUNTER
I spoke to the patient's wife and reviewed her 's CT scan. Her  is going to follow-up with pulmonary.  He was already contacted by his primary care physician. Patient's wife is agreeable for her  to have a CT scan of his chest in 1 year to reassess his thoracic aorta size.    Can someone please call the patient or his wife and schedule him for a CT scan of his chest without contrast for 1 year from now.

## 2023-08-02 NOTE — TELEPHONE ENCOUNTER
Agree with referral pulmonary based on the findings.  Follow-up CT scan in 1 year to assess thoracic aortic size  Otherwise no outstanding cardiac findings  Thank you  pmc

## 2023-08-02 NOTE — TELEPHONE ENCOUNTER
Pt's spouse Jessica called, Jessica states the pt received CT Chest with PCP today and results were faxed to the office for Dr. Salter.    Jessica wants to speak with Dr. Salter to discuss the findings from the pt's CT Chest.    pt can be reached at [phone]

## 2023-08-04 NOTE — TELEPHONE ENCOUNTER
I  called the pt and left a vm leaving him the number to set up the Ct scan of the chest w/o contrast within 1 yr

## 2023-11-15 ENCOUNTER — TRANSCRIBE ORDERS (OUTPATIENT)
Dept: SCHEDULING | Age: 83
End: 2023-11-15

## 2023-11-15 DIAGNOSIS — J45.30 MILD PERSISTENT ASTHMA, UNCOMPLICATED: Primary | ICD-10-CM

## 2023-11-20 ENCOUNTER — HOSPITAL ENCOUNTER (OUTPATIENT)
Dept: PULMONOLOGY | Facility: HOSPITAL | Age: 83
Discharge: HOME | End: 2023-11-20
Attending: HOSPITALIST
Payer: MEDICARE

## 2023-11-20 VITALS — HEIGHT: 68 IN | BODY MASS INDEX: 28.49 KG/M2 | WEIGHT: 188 LBS

## 2023-11-20 DIAGNOSIS — J45.30 MILD PERSISTENT ASTHMA, UNCOMPLICATED: ICD-10-CM

## 2023-11-20 PROCEDURE — 94726 PLETHYSMOGRAPHY LUNG VOLUMES: CPT

## 2023-11-20 PROCEDURE — 94729 DIFFUSING CAPACITY: CPT

## 2023-11-20 PROCEDURE — 63700000 HC SELF-ADMINISTRABLE DRUG: Performed by: HOSPITALIST

## 2023-11-20 PROCEDURE — 94060 EVALUATION OF WHEEZING: CPT

## 2023-11-20 PROCEDURE — 94618 PULMONARY STRESS TESTING: CPT

## 2023-11-20 RX ORDER — ALBUTEROL SULFATE 90 UG/1
4 INHALANT RESPIRATORY (INHALATION) ONCE
Status: COMPLETED | OUTPATIENT
Start: 2023-11-20 | End: 2023-11-20

## 2023-11-20 RX ADMIN — ALBUTEROL SULFATE 4 PUFF: 108 INHALANT RESPIRATORY (INHALATION) at 08:55

## 2024-02-02 ENCOUNTER — OFFICE VISIT (OUTPATIENT)
Dept: CARDIOLOGY | Facility: CLINIC | Age: 84
End: 2024-02-02
Payer: COMMERCIAL

## 2024-02-02 VITALS
BODY MASS INDEX: 26.98 KG/M2 | WEIGHT: 178 LBS | DIASTOLIC BLOOD PRESSURE: 70 MMHG | HEART RATE: 61 BPM | SYSTOLIC BLOOD PRESSURE: 134 MMHG | OXYGEN SATURATION: 96 % | HEIGHT: 68 IN

## 2024-02-02 DIAGNOSIS — E78.5 DYSLIPIDEMIA: Chronic | ICD-10-CM

## 2024-02-02 DIAGNOSIS — Z95.1 S/P CABG (CORONARY ARTERY BYPASS GRAFT): Primary | Chronic | ICD-10-CM

## 2024-02-02 DIAGNOSIS — I63.30 CEREBRAL INFARCTION DUE TO THROMBOSIS OF CEREBRAL ARTERY (CMS/HCC): ICD-10-CM

## 2024-02-02 DIAGNOSIS — J45.30 MILD PERSISTENT ASTHMA, UNCOMPLICATED: ICD-10-CM

## 2024-02-02 LAB
ATRIAL RATE: 61
P AXIS: 21
PR INTERVAL: 194
QRS DURATION: 122
QT INTERVAL: 430
QTC CALCULATION(BAZETT): 432
R AXIS: -30
T WAVE AXIS: 21
VENTRICULAR RATE: 61

## 2024-02-02 PROCEDURE — 99214 OFFICE O/P EST MOD 30 MIN: CPT | Performed by: INTERNAL MEDICINE

## 2024-02-02 PROCEDURE — 93000 ELECTROCARDIOGRAM COMPLETE: CPT | Performed by: INTERNAL MEDICINE

## 2024-02-02 RX ORDER — CETIRIZINE HYDROCHLORIDE 10 MG/1
TABLET ORAL DAILY PRN
COMMUNITY

## 2024-02-02 RX ORDER — CLOPIDOGREL BISULFATE 75 MG/1
75 TABLET ORAL DAILY
Qty: 90 TABLET | Refills: 3 | Status: SHIPPED | OUTPATIENT
Start: 2024-02-02 | End: 2025-02-12

## 2024-02-02 RX ORDER — ATORVASTATIN CALCIUM 20 MG/1
20 TABLET, FILM COATED ORAL
Qty: 90 TABLET | Refills: 3 | Status: SHIPPED | OUTPATIENT
Start: 2024-02-02 | End: 2025-01-17

## 2024-02-02 ASSESSMENT — ENCOUNTER SYMPTOMS
CHANGE IN BOWEL HABIT: 1
ALLERGIC/IMMUNOLOGIC NEGATIVE: 1
COUGH: 0
BACK PAIN: 0
DECREASED APPETITE: 0
FREQUENCY: 0
HEMATURIA: 0
BRUISES/BLEEDS EASILY: 0
BLOATING: 0
BLURRED VISION: 0
WHEEZING: 0
WEIGHT GAIN: 0
WEIGHT LOSS: 0
WEAKNESS: 0
ENDOCRINE NEGATIVE: 1
PND: 0
ORTHOPNEA: 0
MYALGIAS: 0
ABDOMINAL PAIN: 0
HOARSE VOICE: 0
DYSURIA: 0
PARESTHESIAS: 0
DYSPNEA ON EXERTION: 1
NERVOUS/ANXIOUS: 0
DIZZINESS: 0
DEPRESSION: 0
POOR WOUND HEALING: 0
JOINT SWELLING: 0
CONSTIPATION: 1
DIARRHEA: 1
HEARTBURN: 0
SHORTNESS OF BREATH: 0
LIGHT-HEADEDNESS: 0
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
## 2024-06-03 ENCOUNTER — HOSPITAL ENCOUNTER (EMERGENCY)
Facility: HOSPITAL | Age: 84
Discharge: HOME | End: 2024-06-04
Attending: EMERGENCY MEDICINE
Payer: COMMERCIAL

## 2024-06-03 ENCOUNTER — APPOINTMENT (EMERGENCY)
Dept: RADIOLOGY | Facility: HOSPITAL | Age: 84
End: 2024-06-03
Payer: COMMERCIAL

## 2024-06-03 ENCOUNTER — TELEPHONE (OUTPATIENT)
Dept: CARDIOLOGY | Facility: CLINIC | Age: 84
End: 2024-06-03
Payer: COMMERCIAL

## 2024-06-03 VITALS
DIASTOLIC BLOOD PRESSURE: 63 MMHG | HEART RATE: 87 BPM | RESPIRATION RATE: 18 BRPM | OXYGEN SATURATION: 93 % | SYSTOLIC BLOOD PRESSURE: 127 MMHG | TEMPERATURE: 98.4 F

## 2024-06-03 DIAGNOSIS — J18.9 PNEUMONIA DUE TO INFECTIOUS ORGANISM, UNSPECIFIED LATERALITY, UNSPECIFIED PART OF LUNG: Primary | ICD-10-CM

## 2024-06-03 DIAGNOSIS — R05.1 ACUTE COUGH: Primary | ICD-10-CM

## 2024-06-03 LAB
ALBUMIN SERPL-MCNC: 4.1 G/DL (ref 3.5–5.7)
ALP SERPL-CCNC: 55 IU/L (ref 34–125)
ALT SERPL-CCNC: 13 IU/L (ref 7–52)
ANION GAP SERPL CALC-SCNC: 9 MEQ/L (ref 3–15)
AST SERPL-CCNC: 17 IU/L (ref 13–39)
ATRIAL RATE: 84
BASOPHILS # BLD: 0.02 K/UL (ref 0.01–0.1)
BASOPHILS NFR BLD: 0.2 %
BILIRUB SERPL-MCNC: 0.9 MG/DL (ref 0.3–1.2)
BUN SERPL-MCNC: 14 MG/DL (ref 7–25)
CALCIUM SERPL-MCNC: 9.4 MG/DL (ref 8.6–10.3)
CHLORIDE SERPL-SCNC: 102 MEQ/L (ref 98–107)
CO2 SERPL-SCNC: 25 MEQ/L (ref 21–31)
CREAT SERPL-MCNC: 1.2 MG/DL (ref 0.7–1.3)
DIFFERENTIAL METHOD BLD: ABNORMAL
EGFRCR SERPLBLD CKD-EPI 2021: >60 ML/MIN/1.73M*2
EOSINOPHIL # BLD: 0.11 K/UL (ref 0.04–0.54)
EOSINOPHIL NFR BLD: 0.9 %
ERYTHROCYTE [DISTWIDTH] IN BLOOD BY AUTOMATED COUNT: 15 % (ref 11.6–14.4)
FLUAV RNA SPEC QL NAA+PROBE: NEGATIVE
FLUBV RNA SPEC QL NAA+PROBE: NEGATIVE
GLUCOSE SERPL-MCNC: 96 MG/DL (ref 70–99)
HCT VFR BLD AUTO: 42.9 % (ref 40.1–51)
HGB BLD-MCNC: 14.3 G/DL (ref 13.7–17.5)
IMM GRANULOCYTES # BLD AUTO: 0.05 K/UL (ref 0–0.08)
IMM GRANULOCYTES NFR BLD AUTO: 0.4 %
LYMPHOCYTES # BLD: 1.65 K/UL (ref 1.2–3.5)
LYMPHOCYTES NFR BLD: 12.9 %
MAGNESIUM SERPL-MCNC: 2 MG/DL (ref 1.8–2.5)
MCH RBC QN AUTO: 32.9 PG (ref 28–33.2)
MCHC RBC AUTO-ENTMCNC: 33.3 G/DL (ref 32.2–36.5)
MCV RBC AUTO: 98.6 FL (ref 83–98)
MONOCYTES # BLD: 1.95 K/UL (ref 0.3–1)
MONOCYTES NFR BLD: 15.2 %
NEUTROPHILS # BLD: 9.01 K/UL (ref 1.7–7)
NEUTS SEG NFR BLD: 70.4 %
NRBC BLD-RTO: 0 %
P AXIS: 31
PDW BLD AUTO: 9.5 FL (ref 9.4–12.4)
PLATELET # BLD AUTO: 197 K/UL (ref 150–350)
POTASSIUM SERPL-SCNC: 4 MEQ/L (ref 3.5–5.1)
PR INTERVAL: 178
PROT SERPL-MCNC: 8 G/DL (ref 6–8.2)
QRS DURATION: 102
QT INTERVAL: 378
QTC CALCULATION(BAZETT): 446
R AXIS: -19
RBC # BLD AUTO: 4.35 M/UL (ref 4.5–5.8)
RSV RNA SPEC QL NAA+PROBE: NEGATIVE
SARS-COV-2 RNA RESP QL NAA+PROBE: NEGATIVE
SODIUM SERPL-SCNC: 136 MEQ/L (ref 136–145)
T WAVE AXIS: 31
TROPONIN I SERPL HS-MCNC: 7.1 PG/ML
TROPONIN I SERPL HS-MCNC: 8.4 PG/ML
VENTRICULAR RATE: 84
WBC # BLD AUTO: 12.79 K/UL (ref 3.8–10.5)

## 2024-06-03 PROCEDURE — 93005 ELECTROCARDIOGRAM TRACING: CPT

## 2024-06-03 PROCEDURE — 83735 ASSAY OF MAGNESIUM: CPT | Performed by: EMERGENCY MEDICINE

## 2024-06-03 PROCEDURE — 63600000 HC DRUGS/DETAIL CODE: Mod: JZ | Performed by: PHYSICIAN ASSISTANT

## 2024-06-03 PROCEDURE — 36415 COLL VENOUS BLD VENIPUNCTURE: CPT

## 2024-06-03 PROCEDURE — 3E0F7GC INTRODUCTION OF OTHER THERAPEUTIC SUBSTANCE INTO RESPIRATORY TRACT, VIA NATURAL OR ARTIFICIAL OPENING: ICD-10-PCS | Performed by: EMERGENCY MEDICINE

## 2024-06-03 PROCEDURE — 93005 ELECTROCARDIOGRAM TRACING: CPT | Performed by: EMERGENCY MEDICINE

## 2024-06-03 PROCEDURE — 87637 SARSCOV2&INF A&B&RSV AMP PRB: CPT | Performed by: EMERGENCY MEDICINE

## 2024-06-03 PROCEDURE — 3E033GC INTRODUCTION OF OTHER THERAPEUTIC SUBSTANCE INTO PERIPHERAL VEIN, PERCUTANEOUS APPROACH: ICD-10-PCS | Performed by: EMERGENCY MEDICINE

## 2024-06-03 PROCEDURE — 85025 COMPLETE CBC W/AUTO DIFF WBC: CPT

## 2024-06-03 PROCEDURE — 71250 CT THORAX DX C-: CPT

## 2024-06-03 PROCEDURE — 71046 X-RAY EXAM CHEST 2 VIEWS: CPT

## 2024-06-03 PROCEDURE — 99284 EMERGENCY DEPT VISIT MOD MDM: CPT | Mod: 25

## 2024-06-03 PROCEDURE — 93010 ELECTROCARDIOGRAM REPORT: CPT | Performed by: INTERNAL MEDICINE

## 2024-06-03 PROCEDURE — 84484 ASSAY OF TROPONIN QUANT: CPT | Performed by: EMERGENCY MEDICINE

## 2024-06-03 PROCEDURE — 84484 ASSAY OF TROPONIN QUANT: CPT

## 2024-06-03 PROCEDURE — 84484 ASSAY OF TROPONIN QUANT: CPT | Mod: 91 | Performed by: EMERGENCY MEDICINE

## 2024-06-03 PROCEDURE — 83735 ASSAY OF MAGNESIUM: CPT

## 2024-06-03 PROCEDURE — 96374 THER/PROPH/DIAG INJ IV PUSH: CPT

## 2024-06-03 PROCEDURE — 80053 COMPREHEN METABOLIC PANEL: CPT

## 2024-06-03 PROCEDURE — 85025 COMPLETE CBC W/AUTO DIFF WBC: CPT | Performed by: EMERGENCY MEDICINE

## 2024-06-03 PROCEDURE — 25000000 HC PHARMACY GENERAL: Performed by: PHYSICIAN ASSISTANT

## 2024-06-03 PROCEDURE — 80053 COMPREHEN METABOLIC PANEL: CPT | Performed by: EMERGENCY MEDICINE

## 2024-06-03 RX ORDER — IPRATROPIUM BROMIDE AND ALBUTEROL SULFATE 2.5; .5 MG/3ML; MG/3ML
3 SOLUTION RESPIRATORY (INHALATION) ONCE
Status: COMPLETED | OUTPATIENT
Start: 2024-06-03 | End: 2024-06-03

## 2024-06-03 RX ADMIN — IPRATROPIUM BROMIDE AND ALBUTEROL SULFATE 3 ML: .5; 3 SOLUTION RESPIRATORY (INHALATION) at 21:17

## 2024-06-03 RX ADMIN — METHYLPREDNISOLONE SODIUM SUCCINATE 125 MG: 125 INJECTION, POWDER, FOR SOLUTION INTRAMUSCULAR; INTRAVENOUS at 21:17

## 2024-06-03 NOTE — TELEPHONE ENCOUNTER
Dr. Salter pt     Last OV 2/2/24    S/P CABG TIA HLD       Pt is dizzy tired and has chest pain. He says he thinks there is something radically wrong.   He said it started Thursday.     Thursday symptoms started   He has an inhaler given to him by pulmonology   He says he has consistent choking phlegm.     He wants someone to check his lungs.   He says he has been out of work since Thursday.     He has a terrible time catching his breath, I advised he should likely go to the ER .    Pt pulmonology is not available until 7/3     Chest pain   Upper chest   Intermittent   Goes on all day long   Green/ phlegm         Pt is going to go to the ER for an evaluation.   Pt/ Pt family maybe reached at: [phone]

## 2024-06-04 PROCEDURE — 63700000 HC SELF-ADMINISTRABLE DRUG: Performed by: PHYSICIAN ASSISTANT

## 2024-06-04 RX ORDER — AZITHROMYCIN 250 MG/1
250 TABLET, FILM COATED ORAL DAILY
Qty: 4 TABLET | Refills: 0 | Status: SHIPPED | OUTPATIENT
Start: 2024-06-04 | End: 2024-06-08

## 2024-06-04 RX ORDER — PREDNISONE 20 MG/1
40 TABLET ORAL DAILY
Qty: 8 TABLET | Refills: 0 | Status: SHIPPED | OUTPATIENT
Start: 2024-06-04 | End: 2025-02-07

## 2024-06-04 RX ORDER — AMOXICILLIN AND CLAVULANATE POTASSIUM 875; 125 MG/1; MG/1
1 TABLET, FILM COATED ORAL
Qty: 14 TABLET | Refills: 0 | Status: SHIPPED | OUTPATIENT
Start: 2024-06-04 | End: 2024-06-11

## 2024-06-04 RX ORDER — AZITHROMYCIN 250 MG/1
500 TABLET, FILM COATED ORAL ONCE
Status: COMPLETED | OUTPATIENT
Start: 2024-06-04 | End: 2024-06-04

## 2024-06-04 RX ORDER — ALBUTEROL SULFATE 90 UG/1
2 INHALANT RESPIRATORY (INHALATION) EVERY 4 HOURS PRN
Qty: 1 EACH | Refills: 0 | Status: SHIPPED | OUTPATIENT
Start: 2024-06-04 | End: 2024-07-04

## 2024-06-04 RX ORDER — AMOXICILLIN AND CLAVULANATE POTASSIUM 875; 125 MG/1; MG/1
1 TABLET, FILM COATED ORAL ONCE
Status: COMPLETED | OUTPATIENT
Start: 2024-06-04 | End: 2024-06-04

## 2024-06-04 RX ADMIN — AMOXICILLIN AND CLAVULANATE POTASSIUM 1 TABLET: 875; 125 TABLET, FILM COATED ORAL at 00:28

## 2024-06-04 RX ADMIN — AZITHROMYCIN 500 MG: 250 TABLET, FILM COATED ORAL at 00:28

## 2024-06-04 ASSESSMENT — ENCOUNTER SYMPTOMS
COUGH: 1
DIARRHEA: 0
ABDOMINAL PAIN: 0
VOMITING: 0
FEVER: 0
SHORTNESS OF BREATH: 1

## 2024-06-04 NOTE — ED ATTESTATION NOTE
I have personally seen and examined Edy Shearer.  I was involved in the care and medical decision making for this patient.    I reviewed and agree with physician assistant / nurse practitioner’s assessment and plan of care; any exceptions are documented below.      My focused history, examination, assessment and plan of care of Edy Shearer is as follows:    Brief History:  HPI    Patient is an 83-year-old male history of CABG, BPH, GERD, asthma, prior stroke who presents for a cough.  He states for the past 4 days his sputum has changed to green.  He feels more short of breath.  His wife says he felt warm yesterday.  Focused Physical Exam:  Physical Exam  Patient is no acute distress  Normal respiratory effort  Diminished breath sounds  Regular rate and rhythm  No calf tenderness or edema  Clear speech      Assessment / Plan:    Patient has a cough with sputum production.  No respiratory distress or hypoxia.  Differential considered includes pneumonia, COPD flare, COVID, and others.  Do not suspect PE or ACS but will check troponin x 2.    Medical Decision Making  Amount and/or Complexity of Data Reviewed  Labs: ordered.  Radiology: ordered.  ECG/medicine tests: ordered.    Risk  Prescription drug management.    ECG  Normal sinus rhythm 84 bpm  No STEMI    I was physically present for the key/critical portions of the following procedures:  Procedures         Lucius Herrera MD  06/03/24 8039
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
## 2024-07-05 ENCOUNTER — TRANSCRIBE ORDERS (OUTPATIENT)
Dept: SCHEDULING | Age: 84
End: 2024-07-05

## 2024-07-05 DIAGNOSIS — N20.0 CALCULUS OF KIDNEY: Primary | ICD-10-CM

## 2024-07-12 ENCOUNTER — HOSPITAL ENCOUNTER (OUTPATIENT)
Dept: RADIOLOGY | Age: 84
Discharge: HOME | End: 2024-07-12
Attending: UROLOGY
Payer: COMMERCIAL

## 2024-07-12 DIAGNOSIS — N20.0 CALCULUS OF KIDNEY: ICD-10-CM

## 2024-07-12 PROCEDURE — 74176 CT ABD & PELVIS W/O CONTRAST: CPT

## 2024-08-02 ENCOUNTER — OFFICE VISIT (OUTPATIENT)
Dept: CARDIOLOGY | Facility: CLINIC | Age: 84
End: 2024-08-02
Payer: COMMERCIAL

## 2024-08-02 VITALS
OXYGEN SATURATION: 97 % | WEIGHT: 180 LBS | HEART RATE: 65 BPM | DIASTOLIC BLOOD PRESSURE: 66 MMHG | HEIGHT: 68 IN | SYSTOLIC BLOOD PRESSURE: 108 MMHG | BODY MASS INDEX: 27.28 KG/M2

## 2024-08-02 DIAGNOSIS — E78.5 DYSLIPIDEMIA: Chronic | ICD-10-CM

## 2024-08-02 DIAGNOSIS — Z95.1 S/P CABG (CORONARY ARTERY BYPASS GRAFT): Primary | Chronic | ICD-10-CM

## 2024-08-02 DIAGNOSIS — I63.30 CEREBRAL INFARCTION DUE TO THROMBOSIS OF CEREBRAL ARTERY (CMS/HCC): ICD-10-CM

## 2024-08-02 DIAGNOSIS — I77.89 ENLARGED THORACIC AORTA (CMS/HCC): ICD-10-CM

## 2024-08-02 LAB
ATRIAL RATE: 65
P AXIS: 22
PR INTERVAL: 196
QRS DURATION: 122
QT INTERVAL: 418
QTC CALCULATION(BAZETT): 434
R AXIS: -18
T WAVE AXIS: 73
VENTRICULAR RATE: 65

## 2024-08-02 PROCEDURE — 93000 ELECTROCARDIOGRAM COMPLETE: CPT | Performed by: INTERNAL MEDICINE

## 2024-08-02 PROCEDURE — 99214 OFFICE O/P EST MOD 30 MIN: CPT | Performed by: INTERNAL MEDICINE

## 2024-08-02 ASSESSMENT — ENCOUNTER SYMPTOMS
DECREASED APPETITE: 0
HEMATURIA: 0
BRUISES/BLEEDS EASILY: 0
BLOATING: 0
CONSTIPATION: 1
JOINT SWELLING: 0
MYALGIAS: 0
HOARSE VOICE: 0
SHORTNESS OF BREATH: 0
WEIGHT LOSS: 0
PARESTHESIAS: 0
CHANGE IN BOWEL HABIT: 1
HEARTBURN: 0
DEPRESSION: 0
DIARRHEA: 1
BLURRED VISION: 0
SPUTUM PRODUCTION: 1
ENDOCRINE NEGATIVE: 1
NERVOUS/ANXIOUS: 0
FREQUENCY: 0
WEIGHT GAIN: 0
DYSPNEA ON EXERTION: 0
LIGHT-HEADEDNESS: 0
BACK PAIN: 0
PND: 0
WHEEZING: 1
DYSURIA: 0
ORTHOPNEA: 0
WEAKNESS: 0
ALLERGIC/IMMUNOLOGIC NEGATIVE: 1
DIZZINESS: 0
COUGH: 1
POOR WOUND HEALING: 0
ABDOMINAL PAIN: 0
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
## 2025-01-17 RX ORDER — ATORVASTATIN CALCIUM 20 MG/1
20 TABLET, FILM COATED ORAL DAILY
Qty: 90 TABLET | Refills: 3 | Status: SHIPPED | OUTPATIENT
Start: 2025-01-17

## 2025-02-07 ENCOUNTER — OFFICE VISIT (OUTPATIENT)
Dept: CARDIOLOGY | Facility: CLINIC | Age: 85
End: 2025-02-07
Payer: COMMERCIAL

## 2025-02-07 VITALS
SYSTOLIC BLOOD PRESSURE: 118 MMHG | BODY MASS INDEX: 28.49 KG/M2 | HEIGHT: 68 IN | DIASTOLIC BLOOD PRESSURE: 56 MMHG | OXYGEN SATURATION: 97 % | WEIGHT: 188 LBS | HEART RATE: 87 BPM

## 2025-02-07 DIAGNOSIS — I63.30 CEREBRAL INFARCTION DUE TO THROMBOSIS OF CEREBRAL ARTERY (CMS/HCC): ICD-10-CM

## 2025-02-07 DIAGNOSIS — Z95.1 S/P CABG (CORONARY ARTERY BYPASS GRAFT): Primary | Chronic | ICD-10-CM

## 2025-02-07 DIAGNOSIS — E78.5 DYSLIPIDEMIA: Chronic | ICD-10-CM

## 2025-02-07 DIAGNOSIS — J45.30 MILD PERSISTENT ASTHMA, UNCOMPLICATED: ICD-10-CM

## 2025-02-07 LAB
ATRIAL RATE: 87
P AXIS: 58
PR INTERVAL: 190
QRS DURATION: 114
QT INTERVAL: 382
QTC CALCULATION(BAZETT): 459
R AXIS: -41
T WAVE AXIS: 66
VENTRICULAR RATE: 87

## 2025-02-07 PROCEDURE — 99214 OFFICE O/P EST MOD 30 MIN: CPT | Performed by: INTERNAL MEDICINE

## 2025-02-07 PROCEDURE — 93000 ELECTROCARDIOGRAM COMPLETE: CPT | Performed by: INTERNAL MEDICINE

## 2025-02-07 ASSESSMENT — ENCOUNTER SYMPTOMS
FREQUENCY: 0
BRUISES/BLEEDS EASILY: 0
DECREASED APPETITE: 0
MYALGIAS: 0
LIGHT-HEADEDNESS: 0
NERVOUS/ANXIOUS: 0
COUGH: 1
ABDOMINAL PAIN: 0
DYSURIA: 0
BACK PAIN: 0
SPUTUM PRODUCTION: 1
ENDOCRINE NEGATIVE: 1
WEAKNESS: 0
JOINT SWELLING: 0
WEIGHT LOSS: 0
BLURRED VISION: 0
DYSPNEA ON EXERTION: 0
HEMATURIA: 0
POOR WOUND HEALING: 0
ORTHOPNEA: 0
PND: 0
HEARTBURN: 0
WHEEZING: 1
WEIGHT GAIN: 0
DIZZINESS: 0
CHANGE IN BOWEL HABIT: 0
ALLERGIC/IMMUNOLOGIC NEGATIVE: 1
BLOATING: 0
SHORTNESS OF BREATH: 0
PARESTHESIAS: 1
DEPRESSION: 0
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
## 2025-02-12 RX ORDER — CLOPIDOGREL BISULFATE 75 MG/1
TABLET ORAL
Qty: 90 TABLET | Refills: 1 | Status: SHIPPED | OUTPATIENT
Start: 2025-02-12

## 2025-02-25 ENCOUNTER — TRANSCRIBE ORDERS (OUTPATIENT)
Dept: SCHEDULING | Age: 85
End: 2025-02-25

## 2025-02-25 ENCOUNTER — HOSPITAL ENCOUNTER (OUTPATIENT)
Dept: RADIOLOGY | Facility: CLINIC | Age: 85
Discharge: HOME | End: 2025-02-25
Attending: FAMILY MEDICINE
Payer: COMMERCIAL

## 2025-02-25 DIAGNOSIS — N20.0 CALCULUS OF KIDNEY: ICD-10-CM

## 2025-02-25 DIAGNOSIS — H91.90 UNSPECIFIED HEARING LOSS, UNSPECIFIED EAR: ICD-10-CM

## 2025-02-25 DIAGNOSIS — N20.0 CALCULUS OF KIDNEY: Primary | ICD-10-CM

## 2025-02-25 DIAGNOSIS — R51.9 HEADACHE, UNSPECIFIED: ICD-10-CM

## 2025-02-25 PROCEDURE — 74018 RADEX ABDOMEN 1 VIEW: CPT

## 2025-02-26 ENCOUNTER — HOSPITAL ENCOUNTER (OUTPATIENT)
Dept: RADIOLOGY | Facility: CLINIC | Age: 85
Discharge: HOME | End: 2025-02-26
Attending: FAMILY MEDICINE
Payer: COMMERCIAL

## 2025-02-26 DIAGNOSIS — R51.9 HEADACHE, UNSPECIFIED: ICD-10-CM

## 2025-02-26 DIAGNOSIS — H91.90 UNSPECIFIED HEARING LOSS, UNSPECIFIED EAR: ICD-10-CM

## 2025-04-24 ENCOUNTER — TELEPHONE (OUTPATIENT)
Dept: SCHEDULING | Facility: CLINIC | Age: 85
End: 2025-04-24
Payer: COMMERCIAL

## 2025-08-06 ENCOUNTER — OFFICE VISIT (OUTPATIENT)
Dept: CARDIOLOGY | Facility: CLINIC | Age: 85
End: 2025-08-06
Payer: COMMERCIAL

## 2025-08-06 VITALS
BODY MASS INDEX: 27.58 KG/M2 | HEIGHT: 68 IN | WEIGHT: 182 LBS | SYSTOLIC BLOOD PRESSURE: 116 MMHG | HEART RATE: 63 BPM | OXYGEN SATURATION: 95 % | DIASTOLIC BLOOD PRESSURE: 56 MMHG

## 2025-08-06 DIAGNOSIS — E78.5 DYSLIPIDEMIA: Primary | Chronic | ICD-10-CM

## 2025-08-06 DIAGNOSIS — I25.10 CAD IN NATIVE ARTERY: ICD-10-CM

## 2025-08-06 DIAGNOSIS — I77.89 ENLARGED THORACIC AORTA (CMS/HCC): ICD-10-CM

## 2025-08-06 DIAGNOSIS — I63.30 CEREBRAL INFARCTION DUE TO THROMBOSIS OF CEREBRAL ARTERY (CMS/HCC): ICD-10-CM

## 2025-08-06 DIAGNOSIS — Z86.73 HISTORY OF CVA (CEREBROVASCULAR ACCIDENT): ICD-10-CM

## 2025-08-06 LAB
ATRIAL RATE: 63
P AXIS: 15
PR INTERVAL: 198
QRS DURATION: 116
QT INTERVAL: 414
QTC CALCULATION(BAZETT): 423
R AXIS: -25
T WAVE AXIS: 33
VENTRICULAR RATE: 63

## 2025-08-06 PROCEDURE — 99214 OFFICE O/P EST MOD 30 MIN: CPT | Performed by: STUDENT IN AN ORGANIZED HEALTH CARE EDUCATION/TRAINING PROGRAM

## 2025-08-06 PROCEDURE — 3008F BODY MASS INDEX DOCD: CPT | Performed by: STUDENT IN AN ORGANIZED HEALTH CARE EDUCATION/TRAINING PROGRAM

## 2025-08-06 PROCEDURE — 93000 ELECTROCARDIOGRAM COMPLETE: CPT | Performed by: STUDENT IN AN ORGANIZED HEALTH CARE EDUCATION/TRAINING PROGRAM

## 2025-08-06 NOTE — LETTER
August 6, 2025     Rainer Schuler DO  [address] [facility]  [address] [facility] [address]    Patient: Ko Shearer  YOB: 1940  Date of Visit: 8/6/2025      Dear Dr. Schuler:    Thank you for referring Ko Shearer to me for evaluation. Below are my notes for this consultation.    If you have questions, please do not hesitate to call me. I look forward to following your patient along with you.         Sincerely,        Nkechi Vernon DO        CC: No Recipients    Nkechi Vernon DO  8/6/2025 12:18 PM  Sign when Signing Visit   Nkechi Vernon DO  Cardiology    [facility]  [facility]  [address]  [address]  [place] [address]    [facility]  [address] [facility] [address]    [phone]    [facility].[employer]     8/6/2025     Reason for visit: cardiovascular follow-up    oK Shearer is a 84 y.o. male who presents for cardiovascular follow-up.  Edy previously followed with Dr. Salter for his cardiovascular care. He has a history of coronary artery disease s/p CABG, stroke, and hyperlipidemia    Edy has been feeling well.  He denies cardiac symptoms including chest discomfort, shortness of breath, palpitations, lower extremity edema, presyncope and syncope. He has back pain which was not relieved by his surgery. He is limited in his walking due to the back pain. He works as a .     Past Medical History:  1.  Coronary artery disease status post CABG  2.  Stroke  3.  Hyperlipidemia  4.  Dilated ascending aorta  5.  Diverticular disease  6.  Internal hemorrhoids  7. Nephrolithiasis  8. Asthma/COPD  9. Melanoma in situ  10. Basal cell carcinoma   11. Esophageal stricture s/p dilation   12. Prostate cancer    Past Surgical History:  1. Tonsillectomy   2. Lumbar spinal surgery  3. Inguinal hernia repair  4. Esophageal dilation  5. CABG  6. Diverticulitis    Medications:  Current Outpatient Medications    Medication Sig Dispense Refill   • aspirin 81 mg enteric coated tablet Take 81 mg by mouth daily.     • atorvastatin (LIPITOR) 20 mg tablet TAKE 1 TABLET ONCE DAILY 90 tablet 3   • cetirizine (ZyrTEC) 10 mg tablet daily as needed.     • cholecalciferol, vitamin D3, 25 mcg (1,000 unit) capsule Take 4 capsules by mouth daily.       • clopidogreL (PLAVIX) 75 mg tablet TAKE 1 TABLET DAILY        (APOTEX) 90 tablet 1   • cyanocobalamin (VITAMIN B12) 1,000 mcg tablet Take 1,000 mcg by mouth daily.       • magnesium chloride 64 mg tablet,delayed release (DR/EC) Take 250 mg by mouth 2 (two) times a day.     • multivitamin capsule Take 1 capsule by mouth 2 (two) times a day.       • pantoprazole (PROTONIX) 40 mg EC tablet Take 40 mg by mouth every morning.       • tamsulosin (FLOMAX) 0.4 mg capsule Take 0.4 mg by mouth nightly.     • albuterol HFA 90 mcg/actuation inhaler Inhale 2 puffs every 4 (four) hours as needed for wheezing or shortness of breath. 1 each 0     No current facility-administered medications for this visit.       Allergies: Nut - unspecified and Adhesive tape-silicones    Social History: Former smoker, quit in 1980. Social alcohol use. Denies illicit drug use.    Family History: Reviewed and notable for mother with congestive heart failure.    Exam:  Objective  Vitals:    08/06/25 1133   BP: (!) 116/56   Pulse: 63   SpO2: 95%     Body mass index is 27.67 kg/m².  Physical Exam  Constitutional:       Appearance: Normal appearance.   HENT:      Head: Normocephalic and atraumatic.   Eyes:      General: No scleral icterus.  Neck:      Vascular: No carotid bruit.   Cardiovascular:      Rate and Rhythm: Normal rate and regular rhythm.      Heart sounds: No murmur heard.  Pulmonary:      Effort: Pulmonary effort is normal. No respiratory distress.      Breath sounds: Normal breath sounds. No wheezing, rhonchi or rales.   Abdominal:      General: There is no distension.      Palpations: Abdomen is soft.    Musculoskeletal:      Right lower leg: No edema.      Left lower leg: No edema.   Skin:     General: Skin is warm and dry.   Neurological:      Mental Status: He is alert and oriented to person, place, and time.   Psychiatric:         Mood and Affect: Mood normal.         Behavior: Behavior normal.         Labs:  Lab Results   Component Value Date    WBC 12.79 (H) 06/03/2024    HGB 14.3 06/03/2024     06/03/2024    ALT 13 06/03/2024    AST 17 06/03/2024     06/03/2024    K 4.0 06/03/2024    CREATININE 1.2 06/03/2024 2/7/2025 lipid panel: Total cholesterol 130 triglycerides 127 HDL 36 LDL 73  Personally reviewed, my interpretation: LDL very close to goal    Cardiovascular Studies:   1. CT Chest without IV Contrast 2024: Thoracic aorta and great vessels:  Ascending aortic aneurysm, 43 mm diameter   2. Nuclear stress test 7/2023: Regadenoson technetium tetrofosmin shows a small fixed inferoapical wall defect which could be due to small scar ot tissue attenuation. No evidence of ischemia. ECG shows no ST changes diagnostic of ischemia.Gated SPECT obtained in the resting state post stress shows left ventricular ejection fraction of 64%. All walls show normal thickening and endocardial excursion. No regional wall motion abnormalities are noted. Loya Treadmill score of 8 which correlates with a low risk study. The patient exercised for 7 minutes and 56 seconds achieving estimated 10 METS. When compared to my review of prior exam from 6/23/2015, no change. This is a likely normal study.  2.  Echo Stress 2022: Normal maximal exercise stress echo. Post exercise wall motion hyperdynamic. No ST segment deviation Dyspnea but no angina. Appropriate heart rate and blood pressure response to exercise. No arrhythmias. Fair exercise tolerance with 8.5 METS workload achieved. Resting echo detailed below.    ECG from today personally reviewed and discussed with the patient shows normal sinus  rhythm    Assessment/Plan  Problem List Items Addressed This Visit       CAD in native artery    Asymptomatic  --Continue aspirin and plavix  --Continue atorvastatin          Cerebral infarction (CMS/HCC)    Relevant Orders    [facility] MUSE ECG 12 lead (clinic performed) (Completed)    Dyslipidemia - Primary (Chronic)    2/7/2025 lipid panel: Total cholesterol 130 triglycerides 127 HDL 36 LDL 73  Comorbidities: Stroke and coronary artery disease  Goal LDL <70  --Continue atorvastatin          Relevant Orders    [facility] MUSE ECG 12 lead (clinic performed) (Completed)    History of CVA (cerebrovascular accident)    --Continue aspirin and plavix  --Continue atorvastatin          Relevant Orders    [facility] MUSE ECG 12 lead (clinic performed) (Completed)    Enlarged thoracic aorta (CMS/HCC)    CT Chest without IV Contrast 2024: Ascending aortic aneurysm, 43 mm diameter   --I have given him a script for repeat CT Chest         Relevant Orders    [facility] MUSE ECG 12 lead (clinic performed) (Completed)    CT CHEST WITHOUT IV CONTRAST        Nkechi Vernon, DO    This letter was generated using speech recognition software. Please excuse any typographical errors.    Return in about 6 months (around 2/6/2026).

## 2025-08-06 NOTE — ASSESSMENT & PLAN NOTE
CT Chest without IV Contrast 2024: Ascending aortic aneurysm, 43 mm diameter   --I have given him a script for repeat CT Chest

## 2025-08-06 NOTE — ASSESSMENT & PLAN NOTE
2/7/2025 lipid panel: Total cholesterol 130 triglycerides 127 HDL 36 LDL 73  Comorbidities: Stroke and coronary artery disease  Goal LDL <70  --Continue atorvastatin

## 2025-08-06 NOTE — PROGRESS NOTES
Nkechi Vernon,   Cardiology    [facility]  [facility]  [address]  [address]  [place] [address]    [facility]  [address] [facility] [address]    [phone]    [facility]     8/6/2025     Reason for visit: cardiovascular follow-up    Ko Shearer is a 84 y.o. male who presents for cardiovascular follow-up.  Edy previously followed with Dr. Salter for his cardiovascular care. He has a history of coronary artery disease s/p CABG, stroke, and hyperlipidemia    Edy has been feeling well.  He denies cardiac symptoms including chest discomfort, shortness of breath, palpitations, lower extremity edema, presyncope and syncope. He has back pain which was not relieved by his surgery. He is limited in his walking due to the back pain. He works as a .     Past Medical History:  1.  Coronary artery disease status post CABG  2.  Stroke  3.  Hyperlipidemia  4.  Dilated ascending aorta  5.  Diverticular disease  6.  Internal hemorrhoids  7. Nephrolithiasis  8. Asthma/COPD  9. Melanoma in situ  10. Basal cell carcinoma   11. Esophageal stricture s/p dilation   12. Prostate cancer    Past Surgical History:  1. Tonsillectomy   2. Lumbar spinal surgery  3. Inguinal hernia repair  4. Esophageal dilation  5. CABG  6. Diverticulitis    Medications:  Current Outpatient Medications   Medication Sig Dispense Refill    aspirin 81 mg enteric coated tablet Take 81 mg by mouth daily.      atorvastatin (LIPITOR) 20 mg tablet TAKE 1 TABLET ONCE DAILY 90 tablet 3    cetirizine (ZyrTEC) 10 mg tablet daily as needed.      cholecalciferol, vitamin D3, 25 mcg (1,000 unit) capsule Take 4 capsules by mouth daily.        clopidogreL (PLAVIX) 75 mg tablet TAKE 1 TABLET DAILY        (APOTEX) 90 tablet 1    cyanocobalamin (VITAMIN B12) 1,000 mcg tablet Take 1,000 mcg by mouth daily.        magnesium chloride 64 mg tablet,delayed release (DR/EC) Take  250 mg by mouth 2 (two) times a day.      multivitamin capsule Take 1 capsule by mouth 2 (two) times a day.        pantoprazole (PROTONIX) 40 mg EC tablet Take 40 mg by mouth every morning.        tamsulosin (FLOMAX) 0.4 mg capsule Take 0.4 mg by mouth nightly.      albuterol HFA 90 mcg/actuation inhaler Inhale 2 puffs every 4 (four) hours as needed for wheezing or shortness of breath. 1 each 0     No current facility-administered medications for this visit.       Allergies: Nut - unspecified and Adhesive tape-silicones    Social History: Former smoker, quit in 1980. Social alcohol use. Denies illicit drug use.    Family History: Reviewed and notable for mother with congestive heart failure.    Exam:  Objective   Vitals:    08/06/25 1133   BP: (!) 116/56   Pulse: 63   SpO2: 95%     Body mass index is 27.67 kg/m².  Physical Exam  Constitutional:       Appearance: Normal appearance.   HENT:      Head: Normocephalic and atraumatic.   Eyes:      General: No scleral icterus.  Neck:      Vascular: No carotid bruit.   Cardiovascular:      Rate and Rhythm: Normal rate and regular rhythm.      Heart sounds: No murmur heard.  Pulmonary:      Effort: Pulmonary effort is normal. No respiratory distress.      Breath sounds: Normal breath sounds. No wheezing, rhonchi or rales.   Abdominal:      General: There is no distension.      Palpations: Abdomen is soft.   Musculoskeletal:      Right lower leg: No edema.      Left lower leg: No edema.   Skin:     General: Skin is warm and dry.   Neurological:      Mental Status: He is alert and oriented to person, place, and time.   Psychiatric:         Mood and Affect: Mood normal.         Behavior: Behavior normal.         Labs:  Lab Results   Component Value Date    WBC 12.79 (H) 06/03/2024    HGB 14.3 06/03/2024     06/03/2024    ALT 13 06/03/2024    AST 17 06/03/2024     06/03/2024    K 4.0 06/03/2024    CREATININE 1.2 06/03/2024 2/7/2025 lipid panel: Total cholesterol  130 triglycerides 127 HDL 36 LDL 73  Personally reviewed, my interpretation: LDL very close to goal    Cardiovascular Studies:   1. CT Chest without IV Contrast 2024: Thoracic aorta and great vessels:  Ascending aortic aneurysm, 43 mm diameter   2. Nuclear stress test 7/2023: Regadenoson technetium tetrofosmin shows a small fixed inferoapical wall defect which could be due to small scar ot tissue attenuation. No evidence of ischemia. ECG shows no ST changes diagnostic of ischemia.Gated SPECT obtained in the resting state post stress shows left ventricular ejection fraction of 64%. All walls show normal thickening and endocardial excursion. No regional wall motion abnormalities are noted. Loya Treadmill score of 8 which correlates with a low risk study. The patient exercised for 7 minutes and 56 seconds achieving estimated 10 METS. When compared to my review of prior exam from 6/23/2015, no change. This is a likely normal study.  2.  Echo Stress 2022: Normal maximal exercise stress echo. Post exercise wall motion hyperdynamic. No ST segment deviation Dyspnea but no angina. Appropriate heart rate and blood pressure response to exercise. No arrhythmias. Fair exercise tolerance with 8.5 METS workload achieved. Resting echo detailed below.    ECG from today personally reviewed and discussed with the patient shows normal sinus rhythm    Assessment/Plan   Problem List Items Addressed This Visit       CAD in native artery    Asymptomatic  --Continue aspirin and plavix  --Continue atorvastatin          Cerebral infarction (CMS/[facility])    Relevant Orders    [facility] MUSE ECG 12 lead (clinic performed) (Completed)    Dyslipidemia - Primary (Chronic)    2/7/2025 lipid panel: Total cholesterol 130 triglycerides 127 HDL 36 LDL 73  Comorbidities: Stroke and coronary artery disease  Goal LDL <70  --Continue atorvastatin          Relevant Orders    [facility] MUSE ECG 12 lead (clinic performed) (Completed)    History of CVA  (cerebrovascular accident)    --Continue aspirin and plavix  --Continue atorvastatin          Relevant Orders    [facility] MUSE ECG 12 lead (clinic performed) (Completed)    Enlarged thoracic aorta (CMS/HCC)    CT Chest without IV Contrast 2024: Ascending aortic aneurysm, 43 mm diameter   --I have given him a script for repeat CT Chest         Relevant Orders    [facility] MUSE ECG 12 lead (clinic performed) (Completed)    CT CHEST WITHOUT IV CONTRAST        Nkechi Vernon,     This letter was generated using speech recognition software. Please excuse any typographical errors.    Return in about 6 months (around 2/6/2026).

## 2025-08-13 ENCOUNTER — HOSPITAL ENCOUNTER (OUTPATIENT)
Dept: RADIOLOGY | Facility: CLINIC | Age: 85
Discharge: HOME | End: 2025-08-13
Attending: STUDENT IN AN ORGANIZED HEALTH CARE EDUCATION/TRAINING PROGRAM
Payer: COMMERCIAL

## 2025-08-13 DIAGNOSIS — I77.89 ENLARGED THORACIC AORTA (CMS/HCC): ICD-10-CM

## 2025-08-13 PROCEDURE — 71250 CT THORAX DX C-: CPT

## 2025-08-19 ENCOUNTER — TELEPHONE (OUTPATIENT)
Dept: SCHEDULING | Facility: CLINIC | Age: 85
End: 2025-08-19
Payer: COMMERCIAL

## 2025-08-19 RX ORDER — CLOPIDOGREL BISULFATE 75 MG/1
75 TABLET ORAL DAILY
Qty: 90 TABLET | Refills: 3 | Status: SHIPPED | OUTPATIENT
Start: 2025-08-19

## 2025-08-21 ENCOUNTER — TELEPHONE (OUTPATIENT)
Dept: SCHEDULING | Facility: CLINIC | Age: 85
End: 2025-08-21
Payer: COMMERCIAL

## (undated) DEVICE — BAG URINARY DRAINAGE 4 LITER

## (undated) DEVICE — SOLN IRRIG STER WATER 1000ML

## (undated) DEVICE — CATH DUAL LUMEN 6FR

## (undated) DEVICE — DRESSING MEPILEX 4X8 BORDER

## (undated) DEVICE — GOWN SURGICAL REINFORCED X-LAR

## (undated) DEVICE — BLADE SCALPEL #15

## (undated) DEVICE — SOLN IRRIG .9%SOD 500ML

## (undated) DEVICE — GLOVE SZ 7.5 PROTEXIS CLASSIC LATEX

## (undated) DEVICE — SUTURE VICRYL 2-0  J762D CR CP-2 18IN

## (undated) DEVICE — SYRINGE DISP LUER-LOK  3 CC

## (undated) DEVICE — Device

## (undated) DEVICE — TUBE SUCTION 1/4INX20FT STERILE

## (undated) DEVICE — GLOVE SZ 8 LINER PROTEXIS PI BL

## (undated) DEVICE — CHECK-FLOW ADAPTOR

## (undated) DEVICE — ***USE 138550*** SUTURE VICRYL 1 J702D CR MO-4 18IN VIOLET

## (undated) DEVICE — BLADE BONE MILL DISP MEDIUM

## (undated) DEVICE — TIP SUCTION FRAZIER 12FR

## (undated) DEVICE — ***USE 138090*** COTTONOIDS 1/2 X 1

## (undated) DEVICE — SOLN IRRIG STER WATER 500ML

## (undated) DEVICE — SOLN IRRIG .9%SOD 1000ML

## (undated) DEVICE — HEEL  ELBOW PROTECTOR

## (undated) DEVICE — COVER BACK TABLE REINFORCED

## (undated) DEVICE — SYRINGE 10CC NO NEEDLE ST

## (undated) DEVICE — MASTISOL LIQUID ADHESIVE

## (undated) DEVICE — COVER MAYO STAND

## (undated) DEVICE — BULB PATHFINDER PLUS

## (undated) DEVICE — EXTRACTOR STONE NITINOL 1.7FR X 115CM NGAGE

## (undated) DEVICE — FIBER HOLMIUM 200 MICRON SINGLE USE

## (undated) DEVICE — HEAD REST WITH DERMAPROX SQUARE

## (undated) DEVICE — TRAY WET SKIN PREP PREMIUM

## (undated) DEVICE — TIP SUCTION YANKAUER

## (undated) DEVICE — ***USE 57698*** SLEEVE FLOWTRON DVT CALF SINGLE USE

## (undated) DEVICE — HEAD REST WITH DERMAPROX INSERT

## (undated) DEVICE — *T* 3.0MM MATCH HEAD PRECISION NEURO BUR

## (undated) DEVICE — TUBING PRESSURE LUERLOK COBE 48

## (undated) DEVICE — TIP BOVIE BLADE COATED INSULATED 2.75IN

## (undated) DEVICE — FORCEP BAYONET BIPOLAR 8"

## (undated) DEVICE — TUBING SMOKE EVAC PENCIL COATED

## (undated) DEVICE — DRAPE STERI TOWEL PLASTIC 18X24

## (undated) DEVICE — NEEDLE SPINAL 18G X3-1-2IN

## (undated) DEVICE — ADAPTER URETHERAL CATH

## (undated) DEVICE — PACK RFID LAMINECTOMY

## (undated) DEVICE — PAD GROUND ELECTROSURGICAL W/CORD

## (undated) DEVICE — BLANKET UPPER BODY BAIR HUGGER

## (undated) DEVICE — APPLICATOR CHLORAPREP 26ML ORANGE TINT

## (undated) DEVICE — SOLN IRRIG .9%SOD 3L

## (undated) DEVICE — DRAPE HALF STERILE

## (undated) DEVICE — CONTAINER SPECIMEN STERILE 5OZ

## (undated) DEVICE — CATH OPEN ENDED 6FR

## (undated) DEVICE — DRESSING MEPILEX 4X4 BORDER

## (undated) DEVICE — DRAPE C-ARM X-RAY EQUIPMENT IMAGE

## (undated) DEVICE — WIRE GUIDE SENSOR DUAL FLEX .038

## (undated) DEVICE — PACK CYSTO PMH

## (undated) DEVICE — DRAPE TOWEL 17X23 NON-STERILE

## (undated) DEVICE — KIT EVAC DRAIN 10FR 400CC 1/8IN

## (undated) DEVICE — GUIDEWIRE TEFLON COATED 0.38MM

## (undated) DEVICE — COVER LIGHTHANDLE

## (undated) DEVICE — BAG DECANTER

## (undated) DEVICE — FORCEP BIPOLAR NONSTICK DISP 8.5IN

## (undated) DEVICE — SET IRRIGATION LEVEL I CYSTO